# Patient Record
Sex: FEMALE | Race: WHITE | NOT HISPANIC OR LATINO | Employment: UNEMPLOYED | URBAN - METROPOLITAN AREA
[De-identification: names, ages, dates, MRNs, and addresses within clinical notes are randomized per-mention and may not be internally consistent; named-entity substitution may affect disease eponyms.]

---

## 2017-01-11 ENCOUNTER — CONVERSION ENCOUNTER (OUTPATIENT)
Dept: MAMMOGRAPHY | Facility: HOSPITAL | Age: 57
End: 2017-01-11

## 2017-02-03 ENCOUNTER — CONVERSION ENCOUNTER (OUTPATIENT)
Dept: PERIOP | Facility: HOSPITAL | Age: 57
End: 2017-02-03

## 2018-02-05 ENCOUNTER — CONVERSION ENCOUNTER (OUTPATIENT)
Dept: GENERAL RADIOLOGY | Facility: HOSPITAL | Age: 58
End: 2018-02-05

## 2018-08-06 ENCOUNTER — OFFICE VISIT CONVERTED (OUTPATIENT)
Dept: ORTHOPEDIC SURGERY | Facility: CLINIC | Age: 58
End: 2018-08-06
Attending: ORTHOPAEDIC SURGERY

## 2018-09-17 ENCOUNTER — OFFICE VISIT CONVERTED (OUTPATIENT)
Dept: ORTHOPEDIC SURGERY | Facility: CLINIC | Age: 58
End: 2018-09-17
Attending: ORTHOPAEDIC SURGERY

## 2018-10-29 ENCOUNTER — OFFICE VISIT CONVERTED (OUTPATIENT)
Dept: ORTHOPEDIC SURGERY | Facility: CLINIC | Age: 58
End: 2018-10-29
Attending: ORTHOPAEDIC SURGERY

## 2018-11-12 ENCOUNTER — OFFICE VISIT CONVERTED (OUTPATIENT)
Dept: ORTHOPEDIC SURGERY | Facility: CLINIC | Age: 58
End: 2018-11-12
Attending: ORTHOPAEDIC SURGERY

## 2018-11-12 ENCOUNTER — CONVERSION ENCOUNTER (OUTPATIENT)
Dept: ORTHOPEDIC SURGERY | Facility: CLINIC | Age: 58
End: 2018-11-12

## 2018-12-27 ENCOUNTER — OFFICE VISIT CONVERTED (OUTPATIENT)
Dept: ORTHOPEDIC SURGERY | Facility: CLINIC | Age: 58
End: 2018-12-27
Attending: ORTHOPAEDIC SURGERY

## 2018-12-27 ENCOUNTER — CONVERSION ENCOUNTER (OUTPATIENT)
Dept: ORTHOPEDIC SURGERY | Facility: CLINIC | Age: 58
End: 2018-12-27

## 2019-02-07 ENCOUNTER — HOSPITAL ENCOUNTER (OUTPATIENT)
Dept: GENERAL RADIOLOGY | Facility: HOSPITAL | Age: 59
Discharge: HOME | End: 2019-02-07
Attending: NURSE PRACTITIONER

## 2019-02-22 ENCOUNTER — HOSPITAL ENCOUNTER (OUTPATIENT)
Dept: ONCOLOGY | Facility: HOSPITAL | Age: 59
Discharge: HOME | End: 2019-02-22
Attending: NURSE PRACTITIONER

## 2019-05-22 ENCOUNTER — HOSPITAL ENCOUNTER (OUTPATIENT)
Dept: ONCOLOGY | Facility: HOSPITAL | Age: 59
Discharge: HOME | End: 2019-05-22
Attending: NURSE PRACTITIONER

## 2019-12-20 ENCOUNTER — OFFICE VISIT CONVERTED (OUTPATIENT)
Dept: ORTHOPEDIC SURGERY | Facility: CLINIC | Age: 59
End: 2019-12-20
Attending: ORTHOPAEDIC SURGERY

## 2019-12-20 ENCOUNTER — CONVERSION ENCOUNTER (OUTPATIENT)
Dept: ORTHOPEDIC SURGERY | Facility: CLINIC | Age: 59
End: 2019-12-20

## 2020-02-04 ENCOUNTER — CONVERSION ENCOUNTER (OUTPATIENT)
Dept: ORTHOPEDIC SURGERY | Facility: CLINIC | Age: 60
End: 2020-02-04

## 2020-02-04 ENCOUNTER — OFFICE VISIT CONVERTED (OUTPATIENT)
Dept: ORTHOPEDIC SURGERY | Facility: CLINIC | Age: 60
End: 2020-02-04
Attending: ORTHOPAEDIC SURGERY

## 2020-02-18 ENCOUNTER — OFFICE VISIT CONVERTED (OUTPATIENT)
Dept: ORTHOPEDIC SURGERY | Facility: CLINIC | Age: 60
End: 2020-02-18
Attending: ORTHOPAEDIC SURGERY

## 2020-03-17 ENCOUNTER — OFFICE VISIT CONVERTED (OUTPATIENT)
Dept: ORTHOPEDIC SURGERY | Facility: CLINIC | Age: 60
End: 2020-03-17
Attending: ORTHOPAEDIC SURGERY

## 2020-05-12 ENCOUNTER — HOSPITAL ENCOUNTER (OUTPATIENT)
Dept: GENERAL RADIOLOGY | Facility: HOSPITAL | Age: 60
Discharge: HOME | End: 2020-05-12

## 2020-08-19 ENCOUNTER — HOSPITAL ENCOUNTER (OUTPATIENT)
Dept: PREADMISSION TESTING | Facility: HOSPITAL | Age: 60
Discharge: HOME | End: 2020-08-19
Attending: ORTHOPAEDIC SURGERY

## 2020-08-20 LAB — SARS-COV-2 RNA SPEC QL NAA+PROBE: NOT DETECTED

## 2020-08-24 ENCOUNTER — HOSPITAL ENCOUNTER (OUTPATIENT)
Dept: PERIOP | Facility: HOSPITAL | Age: 60
Setting detail: HOSPITAL OUTPATIENT SURGERY
Discharge: HOME | End: 2020-08-24
Attending: ORTHOPAEDIC SURGERY

## 2020-08-24 LAB
GLUCOSE BLD-MCNC: 120 MG/DL (ref 65–99)
GLUCOSE BLD-MCNC: 122 MG/DL (ref 65–99)

## 2020-09-08 ENCOUNTER — OFFICE VISIT CONVERTED (OUTPATIENT)
Dept: ORTHOPEDIC SURGERY | Facility: CLINIC | Age: 60
End: 2020-09-08
Attending: ORTHOPAEDIC SURGERY

## 2020-09-09 ENCOUNTER — OFFICE VISIT CONVERTED (OUTPATIENT)
Dept: ONCOLOGY | Facility: HOSPITAL | Age: 60
End: 2020-09-09
Attending: NURSE PRACTITIONER

## 2020-09-09 ENCOUNTER — HOSPITAL ENCOUNTER (OUTPATIENT)
Dept: OTHER | Facility: HOSPITAL | Age: 60
Discharge: HOME | End: 2020-09-09
Attending: NURSE PRACTITIONER

## 2020-10-05 ENCOUNTER — OFFICE VISIT CONVERTED (OUTPATIENT)
Dept: ORTHOPEDIC SURGERY | Facility: CLINIC | Age: 60
End: 2020-10-05
Attending: PHYSICIAN ASSISTANT

## 2020-12-30 ENCOUNTER — HOSPITAL ENCOUNTER (OUTPATIENT)
Dept: GENERAL RADIOLOGY | Facility: HOSPITAL | Age: 60
Discharge: HOME | End: 2020-12-30
Attending: NURSE PRACTITIONER

## 2020-12-30 LAB
CREAT BLD-MCNC: 1 MG/DL (ref 0.6–1.4)
GFR SERPLBLD BASED ON 1.73 SQ M-ARVRAT: >60 ML/MIN/{1.73_M2}

## 2021-02-25 ENCOUNTER — HOSPITAL ENCOUNTER (OUTPATIENT)
Dept: GENERAL RADIOLOGY | Facility: HOSPITAL | Age: 61
Discharge: HOME | End: 2021-02-25
Attending: NURSE PRACTITIONER

## 2021-03-09 ENCOUNTER — HOSPITAL ENCOUNTER (OUTPATIENT)
Dept: ONCOLOGY | Facility: HOSPITAL | Age: 61
Discharge: HOME | End: 2021-03-09
Attending: NURSE PRACTITIONER

## 2021-03-09 ENCOUNTER — OFFICE VISIT CONVERTED (OUTPATIENT)
Dept: ONCOLOGY | Facility: HOSPITAL | Age: 61
End: 2021-03-09
Attending: NURSE PRACTITIONER

## 2021-03-09 LAB
ALBUMIN SERPL-MCNC: 3.7 G/DL (ref 3.5–5)
ALBUMIN/GLOB SERPL: 1.3 {RATIO} (ref 1.4–2.6)
ALP SERPL-CCNC: 71 U/L (ref 53–141)
ALT SERPL-CCNC: 22 U/L (ref 10–40)
ANION GAP SERPL CALC-SCNC: 21 MMOL/L (ref 8–19)
AST SERPL-CCNC: 25 U/L (ref 15–50)
BASOPHILS # BLD AUTO: 0.01 10*3/UL (ref 0–0.2)
BASOPHILS NFR BLD AUTO: 0.4 % (ref 0–3)
BILIRUB SERPL-MCNC: 0.27 MG/DL (ref 0.2–1.3)
BUN SERPL-MCNC: 15 MG/DL (ref 5–25)
BUN/CREAT SERPL: 13 {RATIO} (ref 6–20)
CALCIUM SERPL-MCNC: 9 MG/DL (ref 8.7–10.4)
CHLORIDE SERPL-SCNC: 94 MMOL/L (ref 99–111)
CONV ABS IMM GRAN: 0.01 10*3/UL (ref 0–0.2)
CONV CO2: 25 MMOL/L (ref 22–32)
CONV IMMATURE GRAN: 0.4 % (ref 0–1.8)
CONV TOTAL PROTEIN: 6.5 G/DL (ref 6.3–8.2)
CREAT UR-MCNC: 1.16 MG/DL (ref 0.5–0.9)
DEPRECATED RDW RBC AUTO: 50 FL (ref 36.4–46.3)
EOSINOPHIL # BLD AUTO: 0.08 10*3/UL (ref 0–0.7)
EOSINOPHIL # BLD AUTO: 3.3 % (ref 0–7)
ERYTHROCYTE [DISTWIDTH] IN BLOOD BY AUTOMATED COUNT: 15.9 % (ref 11.7–14.4)
GFR SERPLBLD BASED ON 1.73 SQ M-ARVRAT: 51 ML/MIN/{1.73_M2}
GLOBULIN UR ELPH-MCNC: 2.8 G/DL (ref 2–3.5)
GLUCOSE SERPL-MCNC: 546 MG/DL (ref 65–99)
HCT VFR BLD AUTO: 28.1 % (ref 37–47)
HGB BLD-MCNC: 8.5 G/DL (ref 12–16)
LDH SERPL-CCNC: 171 U/L (ref 120–240)
LYMPHOCYTES # BLD AUTO: 0.69 10*3/UL (ref 1–5)
LYMPHOCYTES NFR BLD AUTO: 28.5 % (ref 20–45)
MCH RBC QN AUTO: 25.9 PG (ref 27–31)
MCHC RBC AUTO-ENTMCNC: 30.2 G/DL (ref 33–37)
MCV RBC AUTO: 85.7 FL (ref 81–99)
MONOCYTES # BLD AUTO: 0.15 10*3/UL (ref 0.2–1.2)
MONOCYTES NFR BLD AUTO: 6.2 % (ref 3–10)
NEUTROPHILS # BLD AUTO: 1.48 10*3/UL (ref 2–8)
NEUTROPHILS NFR BLD AUTO: 61.2 % (ref 30–85)
NRBC CBCN: 0 % (ref 0–0.7)
OSMOLALITY SERPL CALC.SUM OF ELEC: 308 MOSM/KG (ref 273–304)
PLATELET # BLD AUTO: 78 10*3/UL (ref 130–400)
PMV BLD AUTO: 11.6 FL (ref 9.4–12.3)
POTASSIUM SERPL-SCNC: 3.5 MMOL/L (ref 3.5–5.3)
RBC # BLD AUTO: 3.28 10*6/UL (ref 4.2–5.4)
SODIUM SERPL-SCNC: 136 MMOL/L (ref 135–147)
WBC # BLD AUTO: 2.42 10*3/UL (ref 4.8–10.8)

## 2021-03-12 ENCOUNTER — HOSPITAL ENCOUNTER (OUTPATIENT)
Dept: CT IMAGING | Facility: HOSPITAL | Age: 61
Discharge: HOME | End: 2021-03-12
Attending: NURSE PRACTITIONER

## 2021-03-12 LAB
BASOPHILS # BLD AUTO: 0.01 10*3/UL (ref 0–0.2)
BASOPHILS # BLD: 1 % (ref 0–3)
BASOPHILS NFR BLD AUTO: 0.4 % (ref 0–3)
CONV ABS BANDS: 0 % (ref 1–5)
CONV ABS IMM GRAN: 0.01 10*3/UL (ref 0–0.2)
CONV ANISOCYTES: SLIGHT
CONV HYPOCHROMIA IN BLOOD BY LIGHT MICROSCOPY: SLIGHT
CONV IMMATURE GRAN: 0.4 % (ref 0–1.8)
CONV SEGMENTED NEUTROPHILS: 64 % (ref 45–70)
DACRYOCYTES BLD QL SMEAR: ABNORMAL
DEPRECATED RDW RBC AUTO: 50 FL (ref 36.4–46.3)
EOSINOPHIL # BLD AUTO: 0.06 10*3/UL (ref 0–0.7)
EOSINOPHIL # BLD AUTO: 2.4 % (ref 0–7)
EOSINOPHIL NFR BLD AUTO: 2 % (ref 0–7)
ERYTHROCYTE [DISTWIDTH] IN BLOOD BY AUTOMATED COUNT: 15.9 % (ref 11.7–14.4)
ERYTHROCYTE [SEDIMENTATION RATE] IN BLOOD: 39 MM/H (ref 0–30)
FERRITIN SERPL-MCNC: 19 NG/ML (ref 10–200)
FOLATE SERPL-MCNC: 19.7 NG/ML (ref 4.8–20)
HCT VFR BLD AUTO: 29.1 % (ref 37–47)
HGB BLD-MCNC: 8.9 G/DL (ref 12–16)
IRON SATN MFR SERPL: 6 % (ref 20–55)
IRON SERPL-MCNC: 29 UG/DL (ref 60–170)
LYMPHOCYTES # BLD AUTO: 0.64 10*3/UL (ref 1–5)
LYMPHOCYTES NFR BLD AUTO: 26 % (ref 20–45)
MCH RBC QN AUTO: 26.2 PG (ref 27–31)
MCHC RBC AUTO-ENTMCNC: 30.6 G/DL (ref 33–37)
MCV RBC AUTO: 85.6 FL (ref 81–99)
MONOCYTES # BLD AUTO: 0.17 10*3/UL (ref 0.2–1.2)
MONOCYTES NFR BLD AUTO: 6.9 % (ref 3–10)
MONOCYTES NFR BLD MANUAL: 7 % (ref 3–10)
NEUTROPHILS # BLD AUTO: 1.57 10*3/UL (ref 2–8)
NEUTROPHILS NFR BLD AUTO: 63.9 % (ref 30–85)
NRBC CBCN: 0 % (ref 0–0.7)
NUC CELL # PRT MANUAL: 0 /100{WBCS}
OVALOCYTES BLD QL SMEAR: SLIGHT
PLAT MORPH BLD: NORMAL
PLATELET # BLD AUTO: 88 10*3/UL (ref 130–400)
PMV BLD AUTO: 11.2 FL (ref 9.4–12.3)
POIKILOCYTOSIS BLD QL SMEAR: SLIGHT
RBC # BLD AUTO: 3.4 10*6/UL (ref 4.2–5.4)
RETICS # AUTO: 0.09 10*6/UL (ref 0.02–0.08)
RETICS/RBC NFR AUTO: 2.69 % (ref 0.5–1.7)
SMALL PLATELETS BLD QL SMEAR: ABNORMAL
T4 FREE SERPL-MCNC: 1.5 NG/DL (ref 0.9–1.8)
TIBC SERPL-MCNC: 456 UG/DL (ref 245–450)
TRANSFERRIN SERPL-MCNC: 319 MG/DL (ref 250–380)
TSH SERPL-ACNC: 2.3 M[IU]/L (ref 0.27–4.2)
VARIANT LYMPHS NFR BLD MANUAL: 26 % (ref 20–45)
VIT B12 SERPL-MCNC: 439 PG/ML (ref 211–911)
WBC # BLD AUTO: 2.46 10*3/UL (ref 4.8–10.8)

## 2021-03-13 LAB
CONV IMMUNOGLOBULIN G (IGG): 927 MG/DL (ref 586–1602)
CONV IMMUNOGLOBULIN M (IGM): 102 MG/DL (ref 26–217)
FREE LIGHT CHAINS: 37.9 MG/L (ref 3.3–19.4)
HAPTOGLOB SERPL-MCNC: 173 MG/DL (ref 33–346)
IGA SERPL-MCNC: 173 MG/DL (ref 87–352)
KAPPA LC/LAMBDA SER: 1.33 {RATIO} (ref 0.26–1.65)
LAMBDA LC FREE SERPL-MCNC: 28.4 MG/L (ref 5.7–26.3)

## 2021-03-15 LAB
ALBUMIN SERPL-MCNC: 3.4 G/DL (ref 2.9–4.4)
ALBUMIN/GLOB SERPL: 1.1 {RATIO} (ref 0.7–1.7)
ALPHA2 GLOB SERPL ELPH-MCNC: 0.8 G/DL (ref 0.4–1)
BETA GLOBULIN: 1.1 G/DL (ref 0.7–1.3)
CONV ALPHA-1-GLOBULIN: 0.2 G/DL (ref 0–0.4)
CONV IMMUNOGLOBULIN G (IGG): 951 MG/DL (ref 586–1602)
CONV IMMUNOGLOBULIN M (IGM): 103 MG/DL (ref 26–217)
CONV PE INTERPRETATION: NORMAL
CONV PE NOTE: NORMAL
CONV TOTAL PROTEIN: 6.4 G/DL (ref 6–8.5)
EPO SERPL-ACNC: 89.4 MIU/ML (ref 2.6–18.5)
GAMMA GLOB SERPL ELPH-MCNC: 0.9 G/DL (ref 0.4–1.8)
GLOBULIN UR ELPH-MCNC: 3 G/DL (ref 2.2–3.9)
IGA SERPL-MCNC: 173 MG/DL (ref 87–352)
M-SPIKE: NORMAL G/DL
PROT PATTERN SERPL IFE-IMP: NORMAL

## 2021-03-17 LAB
COPPER SERPL-MCNC: 152 UG/DL (ref 80–158)
ZINC SERPL-MCNC: 94 UG/DL (ref 44–115)

## 2021-04-05 ENCOUNTER — OFFICE VISIT CONVERTED (OUTPATIENT)
Dept: GASTROENTEROLOGY | Facility: CLINIC | Age: 61
End: 2021-04-05
Attending: NURSE PRACTITIONER

## 2021-04-07 ENCOUNTER — HOSPITAL ENCOUNTER (OUTPATIENT)
Dept: CARDIOLOGY | Facility: HOSPITAL | Age: 61
Discharge: HOME | End: 2021-04-07
Attending: NURSE PRACTITIONER

## 2021-04-07 ENCOUNTER — HOSPITAL ENCOUNTER (OUTPATIENT)
Dept: OTHER | Facility: HOSPITAL | Age: 61
Discharge: HOME | End: 2021-04-07
Attending: NURSE PRACTITIONER

## 2021-04-07 LAB
C DIFF TOX B STL QL CT TISS CULT: NEGATIVE
CONV 027 TOXIN: NEGATIVE

## 2021-04-08 LAB
CONV NEUTRAL FATS: NORMAL
FAT STL QL: NORMAL

## 2021-04-09 LAB — BACTERIA SPEC AEROBE CULT: NORMAL

## 2021-05-04 ENCOUNTER — HOSPITAL ENCOUNTER (OUTPATIENT)
Dept: ONCOLOGY | Facility: HOSPITAL | Age: 61
Setting detail: RECURRING SERIES
Discharge: HOME | End: 2021-05-04
Attending: NURSE PRACTITIONER

## 2021-05-04 LAB
BASOPHILS # BLD AUTO: 0.01 10*3/UL (ref 0–0.2)
BASOPHILS NFR BLD AUTO: 0.4 % (ref 0–3)
CONV ABS IMM GRAN: 0 10*3/UL (ref 0–0.54)
CONV EOSINOPHILS PERCENT BY MANUAL COUNT: 3.1 % (ref 0–7)
CONV IMMATURE GRAN: 0 % (ref 0–0.4)
EOSINOPHIL # BLD MANUAL: 0.08 10*3/UL (ref 0–0.7)
ERYTHROCYTE [DISTWIDTH] IN BLOOD BY AUTOMATED COUNT: 19 % (ref 11.5–14.5)
ERYTHROCYTE [DISTWIDTH] IN BLOOD BY AUTOMATED COUNT: 64.6 FL
FERRITIN SERPL-MCNC: 505 NG/ML (ref 10–200)
HBA1C MFR BLD: 10.5 G/DL (ref 12–16)
HCT VFR BLD AUTO: 33 % (ref 37–47)
IRON SATN MFR SERPL: 18 % (ref 20–55)
IRON SERPL-MCNC: 59 UG/DL (ref 60–170)
LYMPHOCYTES # BLD AUTO: 0.6 10*3/UL (ref 1–5)
LYMPHOCYTES NFR BLD AUTO: 23.2 % (ref 20–45)
MCH RBC QN AUTO: 29.5 PG (ref 27–31)
MCHC RBC AUTO-ENTMCNC: 31.8 G/DL (ref 33–37)
MCV RBC AUTO: 92.7 FL (ref 81–99)
MONOCYTES # BLD AUTO: 0.15 10*3/UL (ref 0.2–1.2)
MONOCYTES NFR BLD MANUAL: 5.8 % (ref 3–10)
NEUTROPHILS # BLD AUTO: 1.75 10*3/UL (ref 2–8)
NEUTROPHILS NFR BLD MANUAL: 67.5 % (ref 30–85)
PATHOLOGY REVIEW: NORMAL
PLATELET # BLD AUTO: 68 10*3/UL (ref 130–400)
PMV BLD AUTO: 10.8 FL (ref 7.4–10.4)
RBC MORPH BLD: 3.56 10*6/UL (ref 4.2–5.4)
TIBC SERPL-MCNC: 327 UG/DL (ref 245–450)
TRANSFERRIN SERPL-MCNC: 229 MG/DL (ref 250–380)
WBC # BLD AUTO: 2.59 10*3/UL (ref 4.8–10.8)

## 2021-05-08 ENCOUNTER — HOSPITAL ENCOUNTER (OUTPATIENT)
Dept: MAMMOGRAPHY | Facility: HOSPITAL | Age: 61
Discharge: HOME | End: 2021-05-08
Attending: NURSE PRACTITIONER

## 2021-05-10 NOTE — H&P
History and Physical      Patient Name: Airam Pool   Patient ID: [id number]   Sex: Female   YOB: 1960    Primary Care Provider: Ольга MCFARLANE   Referring Provider: Ольга MCFARLANE    Visit Date: April 5, 2021    Provider: ALEXIS REYNOSO   Location: [facility]   Location Address: [address] [place] [address]  [id number]   Location Phone: [phone]          Chief Complaint  · Gastroesophageal reflux      History Of Present Illness  The patient is a 60 year old /White female, who presents on referral from Ольга MCFARLANE, for a gastroenterology evaluation of GERD. She describes episodes of abdominal pain, aspiration, heartburn, sour eructations, chronic coughing, hoarseness, and nausea, which began 10 years ago. When the pain occurs it is located in the RLQ and LLQ. The pain is a dull and aching type pain. The pain is moderate in severity. The patient's heartburn is exacerbated by spicy food, eating late, and lying down at night, and when it occurs it lasts a variable amount of time. It is improved by PPI's, Liquid Anti-acids, and sitting up. The nausea has been present for 1 year. The nausea occurs intermittently. When the nausea occurs the severity is mild. The patient is overweight. The patient has been on therapy for Acid Reflux. They have taken omeprazole and Protonix. It has been unsuccessful in relieving their symptoms.   Radiographic Studies:  She has undergone no recent radiographic imaging.   Diagnostic studies /Procedures:  Relevant procedures performed to date: an EGD with biopsy. The EGD with biopsy by Star Fischer MD was performed approximately 6 years ago, and revealed gastritis.      60-year-old female presented to the office today as a new patient with a history of reflux, diabetes and cholecystectomy.  Patient reports that she is having reflux severe at night.  Patient reports when she is lying down she is having shortness  of breath, but burning sensation in her esophagus, sour erections, chronic cough and hoarseness.  Patient reports that she has had reflux for over 10 years now.  Patient reports that lying down makes her symptoms worse and she was previously on omeprazole 40 mg but has been changed to Protonix 40 mg with no relief.  Patient has been on reflux therapy for over 10 years.  Patient's last EGD was in 2015 and her last colonoscopy was in 2012.  Patient has no family history of colorectal or intestinal cancers.  Patient reports she is also experiencing diarrhea that has been present for about a year.  The diarrhea is occurring 5-6 times during the day and at least 2 times at night and is described as watery brown stool.  She denies any exposure to antibiotics, food that may have been bad or contaminated water.  Patient is on city water.  Patient denies melena, hematochezia, fever, weight loss, night sweats.    Colonoscopy: Reviewed the patient's most recent colonoscopy 3/3/2012 revealed a normal colon throughout with recommended follow-up in 10 years.    EGD: Review of the patient's most recent EGD performed by Dr. Fischer 7/1/2015 revealed mild nonerosive gastritis in the antrum and the body of the stomach.         Past Medical History  Disease Name Date Onset Notes   AC (acromioclavicular) arthritis, bilateral shoulders 08/08/2018 --    Allergic rhinitis --  --    Arthritis --  --    Bilateral shoulder pain, unspecified chronicity 08/08/2018 --    Bilateral shoulders rotator cuff tendinitis 08/08/2018 --    Cancer --  --    Cervical spondylosis without myelopathy 05/05/2015 No signal change or significant stenosis   Diabetes --  --    Diabetes, unspecified --  --    Gastroesophageal Reflux Disorder --  --    Heart disease --  --    Hyperlipemia --  --    Hypertension --  --    Neck pain --  --    Pain, Head --  --    Pain, Joint --  --    Reflux --  --    Seasonal allergies --  --    Tendonitis: Bilateral Shoulder  09/19/2018 --          Past Surgical History  Procedure Name Date Notes   Cervical Fusion --  --    Cholecystectomy --  --    Colonoscopy 2013 --    EGD 2015 --    Gallbladder --  --    Surgical Clips --  --    Tubal ligation --  --          Medication List  Name Date Started Instructions   anastrozole 1 mg oral tablet  take 1 tablet (1 mg) by oral route once daily   carvedilol 6.25 mg oral tablet  take 1 tablet (6.25 mg) by oral route 2 times per day with food   gabapentin 300 mg oral capsule  take 1 capsule (300 mg) by oral route 3 times per day   glipizide 5 mg oral tablet  take 1 tablet (5 mg) by oral route 2 times per day before meals   metformin 500 mg oral tablet  take 1 tablet (500 mg) by oral route 2 times per day with morning and evening meals   omeprazole 20 mg oral capsule,delayed release(DR/EC)  take 1 capsule by oral route 2 times a day for 10 days   pravastatin 20 mg oral tablet  take 1 tablet (20 mg) by oral route once daily at bedtime   Tresiba FlexTouch U-100 100 unit/mL (3 mL) subcutaneous insulin pen  inject by subcutaneous route as per insulin protocol         Allergy List  Allergen Name Date Reaction Notes   NO KNOWN DRUG ALLERGIES --  --  --          Family Medical History  Disease Name Relative/Age Notes   Stroke Son/   Son   Cancer, Unspecified Father/   Father   - No Family History of Colorectal Cancer  --          Social History  Finding Status Start/Stop Quantity Notes   Alcohol Use Never --/-- --  does not drink   Claustophobic Unknown --/-- --  yes   lives with spouse --  --/-- --  --    . --  --/-- --  --    Recreational Drug Use Never --/-- --  no   Tobacco Former --/-- --  former smoker   Working --  --/-- --  --          Review of Systems  · Constitutional  o Denies  o : chills, fatigue, fever, loss of appetite, night sweats, weakness, weight gain, weight loss  · Eyes  o Denies  o : blurred vision, changes in vision  · Cardiovascular  o Denies  o : chest  "pain  · Respiratory  o Denies  o : shortness of breath  · Gastrointestinal  o Admits  o : abdominal pain, bowel changes, diarrhea, heartburn, nausea, reflux  o Denies  o : appetite poor, bloating, blood in stools, constipation, early satiety, hematemesis (vomiting blood), hemorrhoids, jaundice, rectal bleeding, vomiting  · Genitourinary  o Denies  o : dysuria, blood in urine  · Integument  o Denies  o : rash  · Neurologic  o Denies  o : tingling or numbness  · Musculoskeletal  o Denies  o : joint pain  · Endocrine  o Denies  o : weight gain, weight loss  · Psychiatric  o Denies  o : anxiety, depression      Vitals  Date Time BP Position Site L\R Cuff Size HR RR TEMP (F) WT  HT  BMI kg/m2 BSA m2 O2 Sat FR L/min FiO2 HC       04/05/2021 09:16 /62 Sitting    73 - R 16  200lbs 0oz 5'  3\" 35.43 2.01 100 %            Physical Examination  · Constitutional  o Appearance  o : Well-nourished, well developed, alert, in no acute distress, alert and oriented X 3.  · Eyes  o Vision  o :   § Visual Fields  § : eyes move symmetrical in all directions  o Sclerae  o : sclerae anicteric  o Pupils and Irises  o : pupils equal and symetrical  · Neck  o Inspection/Palpation  o : Trachea is midline, no adenopathy  o Thyroid  o : Thyroid is not enlarged  · Respiratory  o Respiratory Effort  o : Breathing is unlabored.  o Inspection of Chest  o : normal appearance, no retractions  o Auscultation of Lungs  o : Chest is clear to auscultation bilaterally.  · Cardiovascular  o Heart  o :   § Auscultation of Heart  § : no murmurs, rubs, or gallops, regular rate and rhythm  · Gastrointestinal  o Abdominal Examination  o : Abdomen is soft, nontender to palpation, with normal active bowel sounds, no appreciable hepatosplenomegaly.  · Genitourinary  o Digital Rectal Examination  o : Deferred  · Skin and Subcutaneous Tissue  o General Inspection  o : Skin is without focal lesions. Skin turgor is normal.  · Psychiatric  o Mood and Affect  o : " Mood and affect are appropriate to circumstances.          Assessment  · Abdominal Pain     789.00/R10.9  · Altered Bowel Habits     787.99/R19.4  · Cough     786.2/R05  · Esophageal Reflux     530.81/K21.9  · Obesity, unspecified     278.00/E66.9  · Diarrhea     787.91/R19.7  · Nocturnal diarrhea     787.91/R19.7      Plan  · Orders  o Consent for Esophagogastrodudodenoscopy (EGD) with dilatation -Possible risk/complications, benefits, and alternatives to surgical or invasive procedure have been explained to patient and/or legal gaurdian. -Patient has been evaluated and can tolerate anesthesia and/or sedation. Risk, benefits, and alternatives to anesthesia and sedation have been explained to patient and/or legal gaurdian. (41400) - - 04/05/2021  o Consent for Colonoscopy Screening -Possible risk/complications, benefits, and alternatives to surgical or invasive procedure have been explained to patient and/or legal gaurdian. -Patient has been evaluated and can tolerate anesthesia and/or sedation. Risks, benefits, and alternatives to anesthesia and sedation have been explained to patient and/or legal gaurdian. () - - 04/05/2021  o Stool culture (32909, 92242) - - 04/05/2021  o Lactoferrin (Fecal) Qualitative (62835) - - 04/05/2021  o C difficile Toxigenic Assay (PCR) [facility] (61908) - - 04/05/2021  o Pancreatic elastase stool (11860) - - 04/05/2021  o Stool Giardia and Cryptosporidium Enzyme Immunoassay [facility] (14343, 32352) - - 04/05/2021  o Fecal Fat, Qualitative (84745) - - 04/05/2021  o Fecal Occult Blood Diagnostic (Send to Lab) [facility] (78410) - - 04/05/2021  · Medications  o Carafate 1 gram oral tablet   SIG: take 1 tablet (1 gram) by oral route 4 times per day on an empty stomach 1 hour before meals and at bedtime for 4 weeks   DISP: (112) Tablet with 3 refills  Prescribed on 04/05/2021     o omeprazole 20 mg oral capsule,delayed release(DR/EC)   SIG: take 1 capsule by oral route 2 times a day for 10 days   DISP:  (20) capsules with 0 refills  Discontinued on 04/05/2021     o Medications have been Reconciled  o Transition of Care or Provider Policy  · Instructions  o DISCUSSION/PLAN:  o 60-year-old female presenting to the office today as a new patient with a history of reflux, abdominal pain, chronic cough, diarrhea, diabetes and cholecystectomy. Patient has had worsening symptoms of her reflux along with new onset of diarrhea over the past 1 year. Patient has been on chronic reflux therapy for over 10 years. She is currently on Protonix 40 mg with no relief. I have recommended patient undergo EGD and colonoscopy for further evaluation. We will obtain stool studies prior to colonoscopy to ensure her diarrhea is not infectious in nature. I have reviewed the risk benefits of the procedures and explained how to collect stool studies properly. Patient will continue Protonix 40 mg we will add Carafate 1 g 4 times daily to see if this helps to relieve her reflux, chronic cough and nausea. If colonoscopy unremarkable I would consider adding colestipol to her current regimen. We will follow-up in the office after EGD and colonoscopy. Patient to call with any questions or concerns. Patient is agreeable to this plan.  · Disposition  o Call or Return if symptoms worsen or persist.  o Meds sent to pharmacy            Electronically Signed by: ALEXIS REYNOSO -Author on April 5, 2021 09:43:25 AM  Electronically Co-signed by: Star Fischer MD -Reviewer on April 18, 2021 01:31:29 AM

## 2021-05-11 ENCOUNTER — OFFICE VISIT CONVERTED (OUTPATIENT)
Dept: ONCOLOGY | Facility: HOSPITAL | Age: 61
End: 2021-05-11
Attending: NURSE PRACTITIONER

## 2021-05-11 ENCOUNTER — HOSPITAL ENCOUNTER (OUTPATIENT)
Dept: ONCOLOGY | Facility: HOSPITAL | Age: 61
Discharge: HOME | End: 2021-05-11
Attending: NURSE PRACTITIONER

## 2021-05-13 NOTE — PROGRESS NOTES
Progress Note      Patient Name: Airam Pool   Patient ID: [id number]   Sex: Female   YOB: 1960    Primary Care Provider: Ольга MCFARLANE   Referring Provider: Ольга MCFARLANE    Visit Date: September 8, 2020    Provider: Maxi Carpio MD   Location: [facility]   Location Address: [address] [place] [address]  [id number]   Location Phone: [phone]          Chief Complaint  · Left wrist pain      History Of Present Illness  Airam Pool is a 59 year old /White female who presents today to [facility].      The patient presents here today for 2 week post op follow up for her left wrist. Patient had a Left wrist de Quervain''s tendon release on 8/26/20. Patient is doing well today. The splint and sutures were removed today. Patient is overall doing well today, and has no other complaints.       Past Medical History  AC (acromioclavicular) arthritis, bilateral shoulders; Allergic rhinitis; Arthritis; Bilateral shoulder pain, unspecified chronicity; Bilateral shoulders rotator cuff tendinitis; Cancer; Cervical spondylosis without myelopathy; Diabetes; Diabetes, unspecified; Gastroesophageal Reflux Disorder; Heart disease; Hyperlipemia; Hypertension; Neck pain; Pain, Head; Pain, Joint; Reflux; Seasonal allergies; Tendonitis: Bilateral Shoulder         Past Surgical History  Cervical Fusion; Cholecystectomy; Colonoscopy; EGD; Gallbladder; Surgical Clips; Tubal ligation         Medication List  Claritin 10 mg oral tablet; diclofenac sodium 75 mg oral tablet,delayed release (DR/EC); gabapentin 300 mg oral capsule; glipizide 5 mg oral tablet; Jardiance 10 mg oral tablet; metformin 500 mg oral tablet; omeprazole 20 mg oral capsule,delayed release(DR/EC); pravastatin 20 mg oral tablet; ranitidine HCl 150 mg oral capsule; tizanidine 4 mg oral tablet; triamterene-hydrochlorothiazid 37.5-25 mg oral tablet; Voltaren 1 % topical gel         Allergy List  NO KNOWN  "DRUG ALLERGIES       Allergies Reconciled  Family Medical History  Stroke; Cancer, Unspecified; - No Family History of Colorectal Cancer         Social History  Alcohol (Never); Alcohol Use (Never); Claustophobic (Unknown); lives with spouse; .; Recreational Drug Use (Never); Tobacco (Former); Working         Review of Systems  · Constitutional  o Denies  o : fever, chills, weight loss  · Cardiovascular  o Denies  o : chest pain, shortness of breath  · Gastrointestinal  o Denies  o : liver disease, heartburn, nausea, blood in stools  · Genitourinary  o Denies  o : painful urination, blood in urine  · Integument  o Denies  o : rash, itching  · Neurologic  o Denies  o : headache, weakness, loss of consciousness  · Musculoskeletal  o Admits  o : painful, swollen joints  · Psychiatric  o Admits  o : depression  o Denies  o : drug/alcohol addiction, anxiety      Vitals  Date Time BP Position Site L\R Cuff Size HR RR TEMP (F) WT  HT  BMI kg/m2 BSA m2 O2 Sat        09/08/2020 10:41 AM         182lbs 0oz 5'  5\" 30.29 1.95           Physical Examination  · Constitutional  o Appearance  o : well developed, well-nourished, no obvious deformities present  · Head and Face  o Head  o :   § Inspection  § : normocephalic  o Face  o :   § Inspection  § : no facial lesions  · Eyes  o Conjunctivae  o : conjunctivae normal  o Sclerae  o : sclerae white  · Ears, Nose, Mouth and Throat  o Ears  o :   § External Ears  § : appearance within normal limits  § Hearing  § : intact  o Nose  o :   § External Nose  § : appearance normal  · Neck  o Inspection/Palpation  o : normal appearance  o Range of Motion  o : full range of motion  · Respiratory  o Respiratory Effort  o : breathing unlabored  o Inspection of Chest  o : normal appearance  o Auscultation of Lungs  o : no audible wheezing or rales  · Cardiovascular  o Heart  o : regular rate  · Gastrointestinal  o Abdominal Examination  o : soft and non-tender  · Skin and Subcutaneous " Tissue  o General Inspection  o : intact, no rashes  · Psychiatric  o General  o : Alert and oriented x3  o Judgement and Insight  o : judgment and insight intact  o Mood and Affect  o : mood normal, affect appropriate  · Left Wrist  o Inspection  o : Tender over incision. Skin is intact. Neurovascularly intact. Well healing incision. No signs of infection          Assessment  · Left wrist de Quervain''s tendon release;Aftercare following surgery of the muskuloskeletal system     V54.81  · Left wrist pain     719.43/M25.532      Plan  · Medications  o Medications have been Reconciled  o Transition of Care or Provider Policy  · Instructions  o Sutures removed in clinic today.  o Reviewed the patient's Past Medical, Social, and Family history as well as the ROS at today's visit, no changes.  o Call or return if worsening symptoms.  o The above service was scribed by Jocelin Khan on my behalf and I attest to the accuracy of the note. jsb  o Plan for follow up in 1 month. Patient may use Vitamin E oil on incision to help with scaring.   o Electronically Identified Patient Education Materials Provided Electronically            Electronically Signed by: Jocelin Khan MA -Author on September 9, 2020 01:00:01 PM  Electronically Co-signed by: Maxi Carpio MD -Reviewer on September 9, 2020 10:13:44 PM

## 2021-05-13 NOTE — PROGRESS NOTES
Progress Note      Patient Name: Airam Pool   Patient ID: [id number]   Sex: Female   YOB: 1960    Primary Care Provider: Ольга MCFARLANE   Referring Provider: Ольга MCFARLANE    Visit Date: October 5, 2020    Provider: Star Baltazar PA-C   Location: [facility]   Location Address: [address] [place] [address]  [id number]   Location Phone: [phone]          Chief Complaint  · follow up on left wrist pain      History Of Present Illness  Airam Pool is a 59 year old /White female who presents today to [facility]. Patient presents for follow-up evaluation of left wrist de Quervain's tendon release, 8/24/2020. Patient states she is doing well she has good use of the hand, states there is an achy pain occasionally but otherwise has full range of motion and strength and denies need for medication for pain. Patient states she is happy with her surgery results. No new complaints today.       Past Medical History  AC (acromioclavicular) arthritis, bilateral shoulders; Allergic rhinitis; Arthritis; Bilateral shoulder pain, unspecified chronicity; Bilateral shoulders rotator cuff tendinitis; Cancer; Cervical spondylosis without myelopathy; Diabetes; Diabetes, unspecified; Gastroesophageal Reflux Disorder; Heart disease; Hyperlipemia; Hypertension; Neck pain; Pain, Head; Pain, Joint; Reflux; Seasonal allergies; Tendonitis: Bilateral Shoulder         Past Surgical History  Cervical Fusion; Cholecystectomy; Colonoscopy; EGD; Gallbladder; Surgical Clips; Tubal ligation         Medication List  Claritin 10 mg oral tablet; diclofenac sodium 75 mg oral tablet,delayed release (DR/EC); gabapentin 300 mg oral capsule; glipizide 5 mg oral tablet; Jardiance 10 mg oral tablet; metformin 500 mg oral tablet; omeprazole 20 mg oral capsule,delayed release(DR/EC); pravastatin 20 mg oral tablet; ranitidine HCl 150 mg oral capsule; tizanidine 4 mg oral tablet;  "triamterene-hydrochlorothiazid 37.5-25 mg oral tablet; Voltaren 1 % topical gel         Allergy List  NO KNOWN DRUG ALLERGIES       Allergies Reconciled  Family Medical History  Stroke; Cancer, Unspecified; - No Family History of Colorectal Cancer         Social History  Alcohol (Never); Alcohol Use (Never); Claustophobic (Unknown); lives with spouse; .; Recreational Drug Use (Never); Tobacco (Former); Working         Review of Systems  · Constitutional  o Denies  o : fever, chills, weight loss  · Cardiovascular  o Denies  o : chest pain, shortness of breath  · Gastrointestinal  o Denies  o : liver disease, heartburn, nausea, blood in stools  · Genitourinary  o Denies  o : painful urination, blood in urine  · Integument  o Denies  o : rash, itching  · Neurologic  o Denies  o : headache, weakness, loss of consciousness  · Musculoskeletal  o Denies  o : painful, swollen joints  · Psychiatric  o Denies  o : drug/alcohol addiction, anxiety, depression      Vitals  Date Time BP Position Site L\R Cuff Size HR RR TEMP (F) WT  HT  BMI kg/m2 BSA m2 O2 Sat FR L/min FiO2 HC       10/05/2020 10:46 AM      93 - R   193lbs 16oz 5'  5\" 32.28 2.01 98 %            Physical Examination  · Constitutional  o Appearance  o : well developed, well-nourished, no obvious deformities present  · Head and Face  o Head  o :   § Inspection  § : normocephalic  o Face  o :   § Inspection  § : no facial lesions  · Eyes  o Conjunctivae  o : conjunctivae normal  o Sclerae  o : sclerae white  · Ears, Nose, Mouth and Throat  o Ears  o :   § External Ears  § : appearance within normal limits  § Hearing  § : intact  o Nose  o :   § External Nose  § : appearance normal  · Neck  o Inspection/Palpation  o : normal appearance  o Range of Motion  o : full range of motion  · Respiratory  o Respiratory Effort  o : breathing unlabored  o Inspection of Chest  o : normal appearance  o Auscultation of Lungs  o : no audible wheezing or " rales  · Cardiovascular  o Heart  o : regular rate  · Gastrointestinal  o Abdominal Examination  o : soft and non-tender  · Skin and Subcutaneous Tissue  o General Inspection  o : intact, no rashes  · Psychiatric  o General  o : Alert and oriented x3  o Judgement and Insight  o : judgment and insight intact  o Mood and Affect  o : mood normal, affect appropriate  · Left Wrist  o Inspection  o : Well-healed incision, no erythema ecchymosis or drainage or signs of infection. Nontender to palpation, full range of motion of fingers and thumb with abduction, adduction, flexion and extension. 5 out of 5  strength, wrist range of motion intact. Neurovascularly intact.          Assessment  · Aftercare following surgery of left wrist de Quervain's tendon release, 8/24/2     V54.81  · Pain: Wrist     719.43/M25.539      Plan  · Medications  o Medications have been Reconciled  o Transition of Care or Provider Policy  · Instructions  o Reviewed the patient's Past Medical, Social, and Family history as well as the ROS at today's visit, no changes.  o Call or return if worsening symptoms.  o Follow up as needed.  o Patient was advised to continue activity as tolerated, follow-up as needed, patient agrees            Electronically Signed by: LYUBOV Alvarez-JHOANA -Author on October 5, 2020 11:46:06 AM  Electronically Co-signed by: Maxi Carpio MD -Reviewer on October 6, 2020 10:09:00 PM

## 2021-05-14 VITALS
DIASTOLIC BLOOD PRESSURE: 62 MMHG | OXYGEN SATURATION: 100 % | BODY MASS INDEX: 35.44 KG/M2 | SYSTOLIC BLOOD PRESSURE: 170 MMHG | HEART RATE: 73 BPM | WEIGHT: 200 LBS | RESPIRATION RATE: 16 BRPM | HEIGHT: 63 IN

## 2021-05-14 VITALS — HEART RATE: 93 BPM | BODY MASS INDEX: 32.32 KG/M2 | HEIGHT: 65 IN | OXYGEN SATURATION: 98 % | WEIGHT: 194 LBS

## 2021-05-14 VITALS — WEIGHT: 182 LBS | HEIGHT: 65 IN | BODY MASS INDEX: 30.32 KG/M2

## 2021-05-15 VITALS
OXYGEN SATURATION: 94 % | HEART RATE: 85 BPM | HEIGHT: 65 IN | WEIGHT: 190 LBS | BODY MASS INDEX: 31.65 KG/M2 | RESPIRATION RATE: 16 BRPM

## 2021-05-15 VITALS
HEART RATE: 74 BPM | OXYGEN SATURATION: 94 % | HEIGHT: 65 IN | RESPIRATION RATE: 16 BRPM | BODY MASS INDEX: 31.65 KG/M2 | WEIGHT: 190 LBS

## 2021-05-15 VITALS — HEIGHT: 65 IN | OXYGEN SATURATION: 97 % | WEIGHT: 188.5 LBS | BODY MASS INDEX: 31.4 KG/M2 | HEART RATE: 84 BPM

## 2021-05-15 VITALS — HEIGHT: 66 IN | OXYGEN SATURATION: 97 % | BODY MASS INDEX: 30.56 KG/M2 | HEART RATE: 72 BPM | WEIGHT: 190.12 LBS

## 2021-05-16 VITALS — WEIGHT: 177 LBS | BODY MASS INDEX: 29.49 KG/M2 | HEART RATE: 74 BPM | OXYGEN SATURATION: 97 % | HEIGHT: 65 IN

## 2021-05-16 VITALS — HEART RATE: 90 BPM | OXYGEN SATURATION: 97 % | BODY MASS INDEX: 29.99 KG/M2 | HEIGHT: 65 IN | WEIGHT: 180 LBS

## 2021-05-16 VITALS — HEART RATE: 81 BPM | OXYGEN SATURATION: 98 % | HEIGHT: 65 IN

## 2021-05-16 VITALS — OXYGEN SATURATION: 96 % | WEIGHT: 164 LBS | HEART RATE: 73 BPM | HEIGHT: 65 IN | BODY MASS INDEX: 27.32 KG/M2

## 2021-05-16 VITALS — OXYGEN SATURATION: 97 % | HEART RATE: 92 BPM | BODY MASS INDEX: 30.68 KG/M2 | WEIGHT: 184.12 LBS | HEIGHT: 65 IN

## 2021-05-21 ENCOUNTER — HOSPITAL ENCOUNTER (OUTPATIENT)
Dept: PREADMISSION TESTING | Facility: HOSPITAL | Age: 61
Discharge: HOME | End: 2021-05-21
Attending: INTERNAL MEDICINE

## 2021-05-21 LAB — SARS-COV-2 RNA SPEC QL NAA+PROBE: NOT DETECTED

## 2021-05-26 ENCOUNTER — HOSPITAL ENCOUNTER (OUTPATIENT)
Dept: ONCOLOGY | Facility: HOSPITAL | Age: 61
Setting detail: RECURRING SERIES
End: 2021-06-14
Attending: NURSE PRACTITIONER

## 2021-05-26 LAB
BASOPHILS # BLD AUTO: 0.02 10*3/UL (ref 0–0.2)
BASOPHILS NFR BLD AUTO: 0.6 % (ref 0–3)
CONV ABS IMM GRAN: 0 10*3/UL (ref 0–0.54)
CONV EOSINOPHILS PERCENT BY MANUAL COUNT: 3.7 % (ref 0–7)
CONV IMMATURE GRAN: 0 % (ref 0–0.4)
EOSINOPHIL # BLD MANUAL: 0.13 10*3/UL (ref 0–0.7)
ERYTHROCYTE [DISTWIDTH] IN BLOOD BY AUTOMATED COUNT: 16.8 % (ref 11.5–14.5)
ERYTHROCYTE [DISTWIDTH] IN BLOOD BY AUTOMATED COUNT: 57.6 FL
HBA1C MFR BLD: 12 G/DL (ref 12–16)
HCT VFR BLD AUTO: 37 % (ref 37–47)
LYMPHOCYTES # BLD AUTO: 0.74 10*3/UL (ref 1–5)
LYMPHOCYTES NFR BLD AUTO: 20.9 % (ref 20–45)
MCH RBC QN AUTO: 29.9 PG (ref 27–31)
MCHC RBC AUTO-ENTMCNC: 32.4 G/DL (ref 33–37)
MCV RBC AUTO: 92 FL (ref 81–99)
MONOCYTES # BLD AUTO: 0.18 10*3/UL (ref 0.2–1.2)
MONOCYTES NFR BLD MANUAL: 5.1 % (ref 3–10)
NEUTROPHILS # BLD AUTO: 2.47 10*3/UL (ref 2–8)
NEUTROPHILS NFR BLD MANUAL: 69.7 % (ref 30–85)
PLATELET # BLD AUTO: 76 10*3/UL (ref 130–400)
PMV BLD AUTO: 10 FL (ref 7.4–10.4)
RBC MORPH BLD: 4.02 10*6/UL (ref 4.2–5.4)
WBC # BLD AUTO: 3.54 10*3/UL (ref 4.8–10.8)

## 2021-05-27 ENCOUNTER — TRANSCRIBE ORDERS (OUTPATIENT)
Dept: ADMINISTRATIVE | Facility: HOSPITAL | Age: 61
End: 2021-05-27

## 2021-05-27 ENCOUNTER — HOSPITAL ENCOUNTER (OUTPATIENT)
Dept: GASTROENTEROLOGY | Facility: HOSPITAL | Age: 61
Setting detail: HOSPITAL OUTPATIENT SURGERY
Discharge: HOME | End: 2021-05-27
Attending: INTERNAL MEDICINE

## 2021-05-27 DIAGNOSIS — K63.89 ILEOCECAL VALVE STENOSIS: Primary | ICD-10-CM

## 2021-05-27 LAB — GLUCOSE BLD-MCNC: 172 MG/DL (ref 65–99)

## 2021-05-28 VITALS
HEART RATE: 68 BPM | DIASTOLIC BLOOD PRESSURE: 57 MMHG | HEART RATE: 73 BPM | SYSTOLIC BLOOD PRESSURE: 181 MMHG | SYSTOLIC BLOOD PRESSURE: 200 MMHG | RESPIRATION RATE: 18 BRPM | WEIGHT: 200.62 LBS | TEMPERATURE: 97.2 F | HEART RATE: 74 BPM | BODY MASS INDEX: 33.16 KG/M2 | OXYGEN SATURATION: 97 % | HEIGHT: 63 IN | DIASTOLIC BLOOD PRESSURE: 59 MMHG | RESPIRATION RATE: 18 BRPM | OXYGEN SATURATION: 100 % | BODY MASS INDEX: 33.38 KG/M2 | WEIGHT: 187.17 LBS | DIASTOLIC BLOOD PRESSURE: 63 MMHG | OXYGEN SATURATION: 98 % | WEIGHT: 198.41 LBS | TEMPERATURE: 97.3 F | SYSTOLIC BLOOD PRESSURE: 155 MMHG | TEMPERATURE: 98.1 F | BODY MASS INDEX: 35.15 KG/M2 | RESPIRATION RATE: 18 BRPM

## 2021-05-28 NOTE — PROGRESS NOTES
Patient: LEILA POOL     Acct: [id number]     Report: #[id number]  UNIT #: [id number]     : 1960    Encounter Date:2021  PRIMARY CARE: SHAY ROGERS  ***Signed***  --------------------------------------------------------------------------------------------------------------------  NURSE INTAKE      Visit Type      Established Patient Visit            Chief Complaint      breast ca            Referring Provider/Copies To      Referring Provider:  SHAY ROGERS      Primary Care Provider:  SHAY ROGERS      Copies To:   SHAY ROGERS            History and Present Illness      Past Oncology Illness History      1) LEFT breast calcification: CNbx: DCIS. (17): size: 5 mm, solid,     cribiform, micropapillary, low to intermediate grade, no necrosis,     calcifications present: ER (+) 95%, [place] (+) 90%.             Treatment:             1) Left breast, excision with  needle lumpectomy: dx: (2/3/17) negative for     DCIS. Stage: pTis (DCIS).       2) Radiation consult for radiation: Radiation not recommended by Dr. Kaufman.     (3/6/2017)      3) Dexa scan: osteopenia in the femoral head. (3/3017)      4) Arimidex  1mg QD started. (17)      5) Mammogram was benign. (18) (19)            HPI - Oncology Interim      1) LEFT breast calcification: CNbx: DCIS. (17): size: 5 mm, solid,     cribiform, micropapillary, low to intermediate grade, no necrosis,     calcifications present: ER (+) 95%, AZ (+) 90%.             Ms. Leila Pool presents for follow up to review mammogram. Mammogram is benign.     She continues to take Anastrozole and is tolerating well. Offers no complaints     with the meds at this time.             2) Pancytopenia: At her last visit, she was noted to have pancytopenia. WBC      2.42, Hemoglobin 8.5, MCV 85, Platelet count 85,000 on 3/9/21. Recheck of lab     work was done on 3/12/21 with wbc of 2.46, hemoglobin of 8.9, platlet count     88,000. Iron studies were  done with iron level of 29, iron sat of 6%, Ferritin     of 19. She was scheduled for Injectafer infusions and CT of abdomen and pelvis     were ordered. Mrs. Sánchez denies any GI bleeding. She does report frequent loose     stools.Denies any difficulty swallowing.             3) Hepatosplenomegaly: CT of abdomen and pelvis  done on 3/12/21 showed fatty     liver with hepatospenomegaly possibly related to underlying liver disease.             4) Loose stools: Stool studies were negative. Taking Imodium for loose stools.     Denies any blood in stool.             5) Carotid Stenosis: Recent carotid dopplers completed: 80-99 % occlusion in the    left internal carotid and 60-70% in the right internal carotid. BP elevated     today of 207/59. Denies any vision changes. Denies any CP, left sided weakness     noted. She is on Lisinopril / HCTZ QD and Carvedilol 6.25 BID.            Clinical Trial Participant      No            ECOG Performance Status      0            Most Recent Lab Findings      Laboratory Tests      21 13:09            Laboratory Tests            Test       21      13:05             Ferritin       505 ng/mL      ()            Most Recent Imaging Findings      [facility]                PACS RADIOLOGY REPORT            Patient: LEILA SÁNCHEZ   Acct: #[id number]   Report: #[id number]            UNIT #: [id number]    DOS: 21 1430      INSURANCE:[employer]   ORDER #:[id number]      LOCATION:[facility]     : 1960            PROVIDERS      ADMITTING:     ATTENDING: VANESSA BEST      FAMILY:  NONE,MD   ORDERING:  VANESSA BEST         OTHER:    DICTATING:  Luke Lopez MD            REQ #:[id number]   EXAM:OSTEO - BONE DENSITY SCAN 1 SITE      REASON FOR EXAM:        REASON FOR VISIT:  OTHER DISORDER OF BONE            *******Signed******         PROCEDURE:   BONE DENSITY SCAN GREATER THAN OR  EQUAL TO ONE SITE             COMPARISON:   None.             TECHNIQUE:   Lumbar vertebral and proximal femoral dual-energy X-ray     absorptiometry (DXA) was       performed on a Readbug device.        INDICATIONS:   OTHER DISORDER OF BONE/POST MENOPAUSE             FINDINGS:                Spine: The bone mineral density extending from L2 through L4 is 1.021 g per CM     squared which       corresponds to a T score of -1.6 and a Z score of -1.1. This is consistent with     osteopenia.             Compared to the previous examination of 3/6/2017, there has been an interval     10.4% decrease in bone       density in the lumbar spine.              Hip: The mean bone density in the femoral neck is 0.859 g per CM squared which     corresponds to a T       score of -1.3 and a Z score of -0.5. This is consistent with osteopenia.             Compared to the previous examination of 3/6/2017, there has been 1% decrease in     bone mineral       density in the femoral necks. A change of 5% or greater is considered     statistically significant.              CONCLUSIONS:      1.  Spine:  Osteopenia      2.  Hips:  Osteopenia      3.    The estimated 10 year probabilities for a major osteoporotic fracture and     for a hip fracture       are 7.1% and 0.5%, respectively.              Luke Lopez M.D.             Electronically Signed and Approved By: Luke Lopez M.D. on 2021 at 15:05                  _______________________________________________________________________________                     [facility]                PACS RADIOLOGY REPORT            Patient: LEILA SÁNCHEZ   Acct: #[id number]   Report: #[id number]            UNIT #: [id number]    DOS: 21 1042      INSURANCE:[employer]   ORDER #:[id number]      LOCATION:CT     : 1960            PROVIDERS      ADMITTING:     ATTENDING: VANESSA BEST      FAMILY:   SHAY ROGERS   ORDERING:  VANESSA BEST onc         OTHER:    DICTATING:  Jd Overton MD            RE #:[id number]   EXAM:ABDPELW - CT ABDOMEN  PELVIS w CONTRAST      REASON FOR EXAM:  FATTY LIVER,HEPATOMEGALY,SPLEENOMEGALY      REASON FOR VISIT:  FATTY LIVER,HEPATOMEGALY,SPLEENOMEGALY            *******Signed******         PROCEDURE:   CT ABDOMEN PELVIS WITH CONTRAST             COMPARISON:   [facility], CT, CT CHEST ABD PEL W CONTRAST,    8/03/2015, 15:59.             INDICATIONS:   GENERALIZED ABDOMEN PAIN WITH DIARRHEA WITH FATTY     LIVER,HEPATOMEGALY AND SPLEENOMEGALY             TECHNIQUE:   After obtaining the patient's consent, CT images were created with     non-ionic intravenous       contrast material.               PROTOCOL:     Standard imaging protocol performed                RADIATION:     DLP: 1906mGy*cm          Automated exposure control was utilized to minimize radiation dose.       CONTRAST:   100cc Isovue 370 I.V.             FINDINGS:         There is hepatic steatosis.  The right lobe of the liver measures about 21 cm in    length which is       enlarged.  There is splenomegaly.  The spleen measures approximately 18.7 cm in     length.  The       patient has had a cholecystectomy.  There is no acute abnormality of the     pancreas or kidneys.             There is some slightly distended small bowel loops with fluid that may relate to    an enteritis.        Assessment of the bowel is limited by the lack of oral contrast.  There is no     bowel obstruction.        The appendix is grossly unremarkable.             The bladder does not appear unusual for the degree of distention.             There is a possible calcification with associated clip along the posterior     margin of the right lobe       of the liver inferiorly.  This is unchanged.             Atherosclerotic changes are noted involving the abdominal aorta.             Upper slices through the lower  chest reveal atelectasis within the lingular     area.             There are some degenerative changes involving the lumbar spine.             There are some changes on the upper slices along the posterior aspect of the     left breast that could       relate to previous surgery/treatment for known breast cancer.             CONCLUSION:         1. Hepatic steatosis.  There is some hepatomegaly.      2. Splenomegaly.  This might relate to underlying liver disease.      3. Atherosclerotic changes noted involving the abdominal aorta      4. Some slightly distended fluid-filled loops of small bowel that may be     reflective of an       enteritis.             DEREJE BLAIR MD             Electronically Signed and Approved By: DEREJE BLAIR MD on 3/12/2021 at 13:32                  ________________________________________________________________________    _________________________________            [facility]                PACS RADIOLOGY REPORT            Patient: LEILA SÁNCHEZ   Acct: #[id number]   Report: #[id number]            UNIT #: [id number]    DOS: 21       INSURANCE:[employer]   ORDER #:[id number]      [facility]     : 1960            PROVIDERS      ADMITTING:     ATTENDING: VANESSA BEST      FAMILY:  SHAY ROGERS   ORDERING:  VANESSA BEST         OTHER:    DICTATING:  Crys Wylie MD            REQ #:[id number]   EXAM:DSBMWT - DIG SCREEN BILAT VERONIKA w 3D APRIL      REASON FOR EXAM:  SCREENING      REASON FOR VISIT:  SCREENING            *******Signed******         PROCEDURE:   DIGITAL SCREENING BILATERAL MAMOGRAM WITH 3D TOMOSYNTHESIS             COMPARISON:   [facility], , MAMMOGRAM OPERATIVE SPECIMEN,     2017, 11:14.        [facility], , DIG SCREENING BILAT VERONIKA W 3D APRIL, 2018, 15:51.        [facility], , DIG SCREENING  BILAT VERONIKA W 3D APRIL,     2/07/2019, 14:31.        [facility], , DIG SCREENING BILAT VERONIKA W 3D APRIL,     5/12/2020, 15:12.             VIEWS:  BILATERAL CC AND MLO VIEWS WERE OBTAINED UTILIZING 3D TOMOSYNTHESIS AND     R2 CAD SOFTWARE             INDICATIONS:   SCREENING             FINDINGS:             Scar marker designates area of post lumpectomy changes upper outer left breast.     Fat necrosis       within the lumpectomy bed and stable position of left breast biopsy clip.        No suspicious mass, area of architectural distortion or suspicious     microcalcification is       identified.              CONCLUSION:             Benign mammogram. Suggest routine mammographic screening.             RECOMMENDATION(S):          ROUTINE MAMMOGRAM AND CLINICAL EVALUATION IN 12 MONTHS.               BIRADS:          DIAGNOSTIC CATEGORY 2--BENIGN FINDING               BREAST COMPOSITION:   Scattered areas fibroglandular density.             PLEASE NOTE:  A NORMAL MAMMOGRAM DOES NOT EXCLUDE THE POSSIBILITY OF BREAST     CANCER.       ANY CLINICALLY SUSPICIOUS PALPABLE LUMP SHOULD BE BIOPSIED.                Crys Wylie M.D.             Electronically Signed and Approved By: Crys Wylie M.D. on 5/10/2021 at     8:35                                            PAST, FAMILY   Past Medical History      Past Medical History:  Diabetes Type 2, High Cholesterol, Hypertension      Hematology/Oncology (F):  Anemia      Other Hematology History:        pancytopenia            Past Surgical History      Cholecystectomy            Family History      Family History:  No Family History            Social History      Lives independently:  Yes      Number of Children:  3      Occupation:              Tobacco Use      Tobacco status:  Current every day smoker, Former smoker      Smoking packs/day:  1            Substance Use      Substance use:  Denies use            REVIEW OF SYSTEMS      General:   Admits: Fatigue;          Denies: Appetite Change, Fever, Night Sweats, Weight Gain, Weight Loss      Eye:  Denies Blurred Vision, Denies Corrective Lenses, Denies Diplopia, Denies     Vision Changes      ENT:  Denies Headache, Denies Hearing Loss, Denies Hoarseness, Denies Sore     Throat      Cardiovascular:  Denies Chest Pain, Denies Palpitations      Respiratory:  Denies: Cough, Coughing Blood, Productive Cough, Shortness of Air,    Wheezing      Gastrointestinal:  Admits Diarrhea; Denies Bloody Stools, Denies Constipation,     Denies Nausea/Vomiting, Denies Problem Swallowing, Denies Unable to Control     Bowels      Genitourinary:  Denies Blood in Urine, Denies Incontinence, Denies Painful     Urination      Musculoskeletal:  Denies Back Pain, Denies Muscle Pain, Denies Painful Joints      Integumentary:  Denies Itching, Denies Lesions, Denies Rash      Neurologic:  Denies Dizziness, Denies Numbness\Tingling, Denies Seizures      Psychiatric:  Denies Anxiety, Denies Depression      Endocrine:  Denies Cold Intolerance, Denies Heat Intolerance      Hematologic/Lymphatic:  Denies Bruising, Denies Bleeding, Denies Enlarged Lymph     Nodes      Reproductive:  Denies: Menopause, Heavy Periods, Pregnant, Still Menstruating            VITAL SIGNS AND SCORES      Vitals      Weight 198 lbs 6.624 oz / 90. kg      Temperature 97.2 F / 36.22 C - Temporal      Pulse 74      Respirations 18      Blood Pressure 200/59 Sitting, Left Arm      Pulse Oximetry 97%, rm air            Pain Score      Experiencing any pain?:  Yes      Pain Scale Used:  Numerical      Pain Intensity:  7            Fatigue Score      Experiencing any fatigue?:  Yes      Fatigue (0-10 scale):  8            PT/OT Functional Screening      No needs identified            Speech Functional Screening      No needs identified            Rehab to be Ordered      Type of Referral to be Ordered:  No needs identified            EXAM      General appearance:  in  no apparent distress, cooperative, appears stated age.      HEENT: No pallor, no icterus, oral mucosa moist      Neck: Supple, trachea central-not deviated      Lymph nodes: none palpable peripherally      Cardiovascular: S1-S2 heard, no murmurs, no rubs, no gallops.      Breast exam:      Respiratory: Clear to auscultation bilaterally, no adventitious sounds      Abdomen/gastro: Soft, nontender, no palpable hepatosplenomegaly, bowel sounds     heard      Skin: No lesions, no rashes, no petechiae.      Extremities: No pedal edema, peripheral pulses felt, no clubbing      Musculoskeletal: Normal tone, no rigidity of muscles; range of motion intact      Spine: No deformities      Psychiatric: Alert and oriented x3, appropriate affect, intact judgment      Neurologic: Cranial nerves II through XII grossly intact, no gross neurological     deficits noted.            PREVENTION      Hx Influenza Vaccination:  Yes      Date Influenza Vaccine Given:  Oct 5, 2020      Influenza Vaccine Declined:  No      2 or More Falls in Past Year?:  No      Fall Past Year with Injury?:  No      Hx Pneumococcal Vaccination:  No      Encouraged to follow-up with:  PCP regarding preventative exams.      Chart initiated by      jaye moreira ma            ALLERGY/MEDS      Allergies      Coded Allergies:             NO KNOWN ALLERGIES (Unverified , 5/11/21)            Medications      Last Reconciled on 5/11/21 16:23 by VANESSA BEST      Anastrozole (Anastrozole) 1 Mg Tablet      1 MG PO QDAY for 90 Days, #90 TAB 3 Refills         Prov: VANESSA BEST onc         5/11/21       Sucralfate (Sucralfate) 1 Gm/10 Ml Oral.susp      5 ML PO QIDAC, #600 ML         Reported         5/11/21       Calcium Citrate/Vitamin D3 (Calcium Cit 250 + D) 1 Each Tablet      1 EACH PO BID for 30 Days, #60 TAB 5 Refills         Prov: VANESSA BEST onc         3/9/21       Pravastatin Sod (Pravachol*) 40 Mg Tablet      80 MG PO QDAY, TAB          Reported         9/9/20       Gabapentin (Gabapentin) 250 Mg/5 Ml Solution      300 MG PO TID, #540 ML 0 Refills         Reported         9/9/20       Carvedilol (Carvedilol) 6.25 Mg Tablet      6.25 MG PO BID, #60 TAB 0 Refills         Reported         8/19/20       Lisinopril-HCTZ 20-25 Mg (Lisinopril-HCTZ 20-25 Mg) 1 Each Tablet      1 TAB PO QDAY, #30 TAB 0 Refills         Reported         8/19/20       Insulin Degludec (Tresiba Flextouch U-100) 100 Unit/1 Ml Insuln.pen      50 UNITS SUBQ QDAY@17 for 30 Days, #1 INSULN.PEN         Reported         8/19/20       Ergocalciferol (Vitamin D2) 50,000 Units Capsule      88198 UNITS PO FR, #4 CAP         Reported         8/19/20       glipiZIDE (glipiZIDE) 10 Mg Tablet      10 MG PO QDAY, #30 TAB 0 Refills         Reported         8/19/20       metFORMIN ER (metFORMIN ER) 500 Mg Tab.er.24      1000 MG PO BID, #30 TAB.ER 0 Refills         Reported         1/19/15      Medications Reviewed:  Changes made to meds            IMPRESSION/PLAN      Impression      1) LEFT breast calcification: CNbx: DCIS. (1/11/17): size: 5 mm, solid,     cribiform, micropapillary, low to intermediate grade, no necrosis,     calcifications present: ER (+) 95%, [place] (+) 90%.             Ms. Airam Pool presents for follow up to review mammogram. Mammogram is benign.     She continues to take Anastrozole and is tolerating well. Offers no complaints     with the meds at this time.             2) Pancytopenia: At her last visit, she was noted to have pancytopenia. WBC      2.42, Hemoglobin 8.5, MCV 85, Platelet count 85,000 on 3/9/21. Recheck of lab     work was done on 3/12/21 with wbc of 2.46, hemoglobin of 8.9, platlet count     88,000. Iron studies were done with iron level of 29, iron sat of 6%, Ferritin     of 19. She was scheduled for Injectafer infusions and CT of abdomen and pelvis     were ordered. Mrs. Pool denies any GI bleeding. She does report frequent loose     stools.Denies any  "difficulty swallowing.             We discussed the need for possible bone marrow biopsy in the future after GI     evaluation is completed to rule out MDS or other underlying hematologic     malignancy.             3) Hepatosplenomegaly: CT of abdomen and pelvis  done on 3/12/21 showed fatty     liver with hepatospenomegaly possibly related to underlying liver disease.             4) Loose stools: Stool studies were negative. Taking Imodium for loose stools.     Denies any blood in stool. Patient is going for EGD / colonoscopy here in the     next week or so. Stool studies are negative.             5) Carotid Stenosis: Recent carotid dopplers completed: 80-99 % occlusion in the    left internal carotid and 60-70% in the right internal carotid. BP elevated     today of 207/59. Denies any vision changes. Denies any CP, left sided weakness     noted. She is on Lisinopril / HCTZ QD and Carvedilol 6.25 BID.             I have discussed with the patient, Ms. Pool the need she needs to go to ER for     the elevated BP, especially in the setting of  severe carotid stenosis in both     carotids. She does not wish to go to the ER for this. I have instructed she is     at extremely high risk of a stroke and consequences. I have also spoken to her     PCP: Ольга Bhagat NP as well just a few minutes ago. Ms. Pool is concerned     about her  and his health status currently stating: \"He is having vision     changes\". Mr. Pool is a patient of this clinic as well with history of bladder     cancer.            Diagnosis      Pancytopenia - D61.818            Notes      New Medications      * Sucralfate 1 GM/10 ML ORAL.SUSP: 5 ML PO QIDAC #600      Renewed Medications      * Anastrozole 1 MG TABLET: 1 MG PO QDAY 90 Days #90      New Diagnostics      * CBC With Auto Diff, 2 Week         Dx: Pancytopenia - D61.818      * CBC With Auto Diff, Month         Dx: Pancytopenia - D61.818            Plan      1) Discussed mammogram " report. Refill Anastrozole.             2) Discussed need for possible bone marrow biopsy to rule out MDS or other     hematologic abnormality after GI evaluation if GI evaluation is benign. She will    see Dr. Boss at the next visit. We will recheck CBC with diff in 2 weeks and 4    weeks.             3) 4) Has follow up with GI next week for EGD / colonoscopy.             5) Discussed hypertension / severe carotid stenosis with her PCP: ALEXIS Sosa. Patient and I discussed going to ER for herself for the hypertensive     urgency and at high risk for stroke since she has severe carotid stenosis.     Patient refused to go to ER against medical advice stating she does not want to     get COVID. Currently, no CP, SOA, left sideed weakness, headache. Patient     reports vascular canceled her appointment with them??. Discussed case with     Ольга Bhagat.             Patient will follow up with Dr. Boss in 1 month.            Pain      Pain Zero Today            Advanced Care Plan Discussion      Declines Discussion 1124F            Patient Education            Carotid Artery Stenosis      DI for Malignant Hypertension      DI for Pancytopenia      Patient Education Provided:  Yes            Electronically signed by VANESSA BEST onc  05/11/2021 16:23       Disclaimer: Converted document may not contain table formatting or lab diagrams. Please see [employer] for the authenticated document.

## 2021-05-28 NOTE — PROGRESS NOTES
Patient: AIRAM SÁNCHEZ     Acct: [id number]     Report: #[id number]  UNIT #: [id number]     : 1960    Encounter Date:2021  PRIMARY CARE: SHAY ROGERS  ***Signed***  --------------------------------------------------------------------------------------------------------------------  ADDENDUM: 21 1338          Addendum: VANESSA BEST onc on 3/24/21 @ 13:38      NURSE INTAKE      Visit Type      Established Patient Visit            Chief Complaint      BREAST CA            Referring Provider/Copies To      Referring Provider:  SHAY ROGERS      Primary Care Provider:  SHAY ROGERS      Copies To:   SHAY ROGERS            History and Present Illness      Past Oncology Illness History      1) LEFT breast calcification: CNbx: DCIS. (17): size: 5 mm, solid,     cribiform, micropapillary, low to intermediate grade, no necrosis,     calcifications present: ER (+) 95%, CT (+) 90%.             Treatment:             1) Left breast, excision with  needle lumpectomy: dx: (2/3/17) negative for     DCIS. Stage: pTis (DCIS).       2) Radiation consult for radiation: Radiation not recommended by Dr. Kaufman.     (3/6/2017)      3) Dexa scan: osteopenia in the femoral head. (3/3017)      4) Arimidex  1mg QD started. (17)      5) Mammogram was benign. (18) (19)            HPI - Oncology Interim      1) LEFT breast calcification: CNbx: DCIS. (17): size: 5 mm, solid,     cribiform, micropapillary, low to intermediate grade, no necrosis,     calcifications present: ER (+) 95%, CT (+) 90%.             Mrs. Airam Sánchez is a very pleasant lady who I have known for quite sometime with    her cancer diagnosis. Her  was recently diagnosed with bladder cancer and    will be receiving treatment here at the cancer center.             She reports she is doing well in the interim. She continues to take the     Anastrozole and is tolerating well. She offers no complaints at this time.  She     continues to take Calicum and Vitamin D supplementation.             She denies any persistent cough, SOA, or vision changes.             2) Anemia: Previous low hemoglobin of 11.4 in January 2020. She denies any     ongoing fatigue or GI symptoms. patient reports she has consult with GI in     April.            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[employer]der            Tobacco Use      Tobacco status:  Current every day smoker, Former smoker      Smoking packs/day:  1            Substance Use      Substance use:  Denies use            REVIEW OF SYSTEMS      General:  Denies: Appetite Change, Fatigue, Fever, Night Sweats, Weight Gain,     Weight Loss      Eye:  Denies Blurred Vision, Denies Corrective Lenses, Denies Diplopia, Denies     Vision Changes      ENT:  Denies Headache, Denies Hearing Loss, Denies Hoarseness, Denies Sore     Throat      Cardiovascular:  Denies Chest Pain, Denies Palpitations      Respiratory:  Denies: Cough, Coughing Blood, Productive Cough, Shortness of Air,    Wheezing      Gastrointestinal:  Admits Diarrhea; Denies Bloody Stools, Denies Constipation,     Denies Nausea/Vomiting, Denies Problem Swallowing, Denies Unable to Control Arjay    els      Genitourinary:  Denies Blood in Urine, Denies Incontinence, Denies Painful     Urination      Musculoskeletal:  Denies Back Pain, Denies Muscle Pain, Denies Painful Joints      Integumentary:  Denies Itching, Denies Lesions, Denies Rash      Neurologic:  Denies Dizziness, Denies Numbness\Tingling, Denies Seizures       Psychiatric:  Denies Anxiety, Denies Depression      Endocrine:  Denies Cold Intolerance, Denies Heat Intolerance      Hematologic/Lymphatic:  Denies Bruising, Denies Bleeding, Denies Enlarged Lymph     Nodes      Reproductive:  Denies: Menopause, Heavy Periods, Pregnant, Still Menstruating            VITAL SIGNS AND SCORES      Vitals      Weight 200 lbs 9.898 oz / 91 kg      Temperature 98.1 F / 36.72 C - Temporal      Pulse 68      Respirations 18      Blood Pressure 181/57 Sitting, Right Arm      Pulse Oximetry 100%, RM AIR            Pain Score      Experiencing any pain?:  No      Pain Scale Used:  Numerical      Pain Intensity:  0            Fatigue Score      Experiencing any fatigue?:  No      Fatigue (0-10 scale):  0 (none)            PT/OT Functional Screening      No needs identified            Speech Functional Screening      No needs identified            Rehab to be Ordered      Type of Referral to be Ordered:  No needs identified            EXAM      General appearance:  in no apparent distress, cooperative, appears stated age.      HEENT: No pallor, no icterus, oral mucosa moist      Neck: Supple, trachea central-not deviated      Lymph nodes: none palpable peripherally      Cardiovascular: S1-S2 heard, no murmurs, no rubs, no gallops.      Breast exam:      Respiratory: Clear to auscultation bilaterally, no adventitious sounds      Abdomen/gastro: Soft, nontender, no palpable hepatosplenomegaly, bowel sounds     heard      Skin: No lesions, no rashes, no petechiae.      Extremities: No pedal edema, peripheral pulses felt, no clubbing      Musculoskeletal: Normal tone, no rigidity of muscles; range of motion intact      Spine: No deformities      Psychiatric: Alert and oriented x3, appropriate affect, intact judgment      Neurologic: Cranial nerves II through XII grossly intact, no gross neurological     deficits noted.            PREVENTION      Hx Influenza Vaccination:  Yes      Date Influenza  Vaccine Given:  Oct 5, 2020      Influenza Vaccine Declined:  No      2 or More Falls in Past Year?:  No      Fall Past Year with Injury?:  No      Hx Pneumococcal Vaccination:  No      Encouraged to follow-up with:  PCP regarding preventative exams.      Chart initiated by      MICAH SOTO MA            ALLERGY/MEDS      Allergies      Coded Allergies:             NO KNOWN ALLERGIES (Unverified , 3/9/21)            Medications      Last Reconciled on 3/9/21 15:41 by VANESSA BEST      Anastrozole (Anastrozole) 1 Mg Tablet      1 MG PO QDAY for 90 Days, #90 TAB 3 Refills         Prov: VANESSA BEST onc         3/9/21       Calcium Citrate/Vitamin D3 (Calcium Cit 250 + D) 1 Each Tablet      1 EACH PO BID for 30 Days, #60 TAB 5 Refills         Prov: VANESSA BEST onc         3/9/21       Pravastatin Sod (Pravachol*) 40 Mg Tablet      80 MG PO QDAY, TAB         Reported         9/9/20       Gabapentin (Gabapentin) 250 Mg/5 Ml Solution      300 MG PO TID, #540 ML 0 Refills         Reported         9/9/20       Carvedilol (Carvedilol) 6.25 Mg Tablet      6.25 MG PO BID, #60 TAB 0 Refills         Reported         8/19/20       Lisinopril-HCTZ 20-25 Mg (Lisinopril-HCTZ 20-25 Mg) 1 Each Tablet      1 TAB PO QDAY, #30 TAB 0 Refills         Reported         8/19/20       Insulin Degludec (Tresiba Flextouch U-100) 100 Unit/1 Ml Insuln.pen      50 UNITS SUBQ QDAY@17 for 30 Days, #1 INSULN.PEN         Reported         8/19/20       Ergocalciferol (Vitamin D2) 50,000 Units Capsule      97325 UNITS PO FR, #4 CAP         Reported         8/19/20       glipiZIDE (glipiZIDE) 10 Mg Tablet      10 MG PO QDAY, #30 TAB 0 Refills         Reported         8/19/20       metFORMIN ER (metFORMIN ER) 500 Mg Tab.er.24      1000 MG PO BID, #30 TAB.ER 0 Refills         Reported         1/19/15       Omeprazole (priLOSEC) 20 Mg Capsule.dr      40 MG PO QDAY, #60 CAP 0 Refills         Reported         1/19/15      Medications  Reviewed:  No Changes made to meds            IMPRESSION/PLAN      Impression      1) LEFT breast calcification: CNbx: DCIS. (1/11/17): size: 5 mm, solid,     cribiform, micropapillary, low to intermediate grade, no necrosis,     calcifications present: ER (+) 95%, ND (+) 90%.             Mrs. Airam Pool is a very pleasant lady who I have known for quite sometime with    her cancer diagnosis. Her  was recently diagnosed with bladder cancer and    will be receiving treatment here at the cancer center.             She reports she is doing well in the interim. She continues to take the     Anastrozole and is tolerating well. She offers no complaints at this time. She     continues to take Calicum and Vitamin D supplementation.             She denies any persistent cough, SOA, or vision changes.             2) Anemia: Previous low hemoglobin of 11.4 in January 2020. She denies any     ongoing fatigue or GI symptoms. patient reports she has consult with GI in     April.             3) Pancytopenia: CBC done today this visit. WBC of 2.42 with ANC of 1480.     Hemoglobin of 8.5, platelet count of 78,000. Denies any GI blood loss or     bruising. Patient has known hepatosplenomegaly / fatty liver noted on prior CT     scan in 2015.             She has a follow up with GI in April.             We will initiate a pancytopenia evaluation with labs and recheck a CT abdomen     and pelvis to evaluate the fatty liver / hepatosplenomegaly. Pancytopenia may be    secondary to underlying history of fatty liver, hepatosplenomegaly. Can consider    a bone marrow biopsy.            Diagnosis      Anemia         Anemia due to chronic kidney disease, unspecified CKD stage - N18.9, D63.1         Chronic kidney disease stage: unspecified stage            DCIS (ductal carcinoma in situ)         Ductal carcinoma in situ (DCIS) of left breast - D05.12         Laterality: left            Pancytopenia - D61.818            Leukopenia -  D72.819            Hepatosplenomegaly - R16.2            Fatty liver - K76.0            Notes      Renewed Medications      * Calcium Citrate/Vitamin D3 (Calcium Cit 250 + D) 1 EACH TABLET: 1 EACH PO BID       30 Days #60      * Anastrozole 1 MG TABLET: 1 MG PO QDAY 90 Days #90      New Diagnostics      * CBC With Auto Diff, Routine         Dx: Anemia - D64.9      * CMP Comp Metabolic Panel, Routine         Dx: Anemia - D64.9      * LDH, Routine         Dx: Anemia - D64.9      * Screening Mammo, SCHEDULED PROCEDURE         Dx: DCIS (ductal carcinoma in situ) - D05.10      * Reticulocyte Count, Routine         Dx: Pancytopenia - D61.818      * Iron Profile, Routine         Dx: Pancytopenia - D61.818      * Ferritin Level, Routine         Dx: Pancytopenia - D61.818      * B12      Dx: Pancytopenia - D61.818      * Haptoglobin, Routine         Dx: Pancytopenia - D61.818      * Sed Rate, Routine         Dx: Pancytopenia - D61.818      * Copper Serum, Routine         Dx: Pancytopenia - D61.818      * Zinc  Serum, Routine         Dx: Pancytopenia - D61.818      * Erythropoietin, Routine         Dx: Pancytopenia - D61.818      * Thyroid Profile, Routine         Dx: Pancytopenia - D61.818      * SPEP with Immuno (IEPS), Routine         Dx: Pancytopenia - D61.818      * IMMUNOGLOBULIN QUANT IGAMQ, Routine         Dx: Pancytopenia - D61.818      * FREE KAPPA LAMBDA LT CHAINS QU, Routine         Dx: Pancytopenia - D61.818      * Path Review Peripheral Smear, Routine         Dx: Leukopenia - D72.819      * Abd/Pelv W/Wo Con CT, As Soon As Possible         Dx: Hepatosplenomegaly - R16.2      New Referrals      * Gastroenterology, As Soon As Possible         [facility]         Dx: Anemia due to chronic kidney disease, unspecified CKD stage - N18.9,        D63.1            Plan      1) Labs today with CBC, CMP LDH.             Pancytopenia evaluation.             Follow up in 2 weeks with NP.             Follow with GI as  scheduled for fatty liver, hepatosplenomegaly.            Pain      Pain Zero Today            Advanced Care Plan Discussion      Declines Discussion [id number]            Patient Education            Nonalcoholic Fatty Liver Disease      Patient Education Provided:  Yes            Electronically signed by VANESSA BEST onc  03/09/2021 15:42       Disclaimer: Converted document may not contain table formatting or lab diagrams. Please see [employer] [facility] for the authenticated document.

## 2021-05-28 NOTE — PROGRESS NOTES
Patient: LEILA POOL     Acct: [id number]     Report: #[id number]  UNIT #: [id number]     : 1960    Encounter Date:2020  PRIMARY CARE: SHAY ROGERS  ***Signed***  --------------------------------------------------------------------------------------------------------------------  NURSE INTAKE      Visit Type      Established Patient Visit            Chief Complaint      BREAST CA HERE FOR F/U            Referring Provider/Copies To      Primary Care Provider:  SHAY ROGERS      Copies To:   SHAY ROGERS            History and Present Illness      Past Oncology Illness History      1) LEFT breast calcification: CNbx: DCIS. (17): size: 5 mm, solid,     cribiform, micropapillary, low to intermediate grade, no necrosis,     calcifications present: ER (+) 95%, VA (+) 90%.             Treatment:             1) Left breast, excision with  needle lumpectomy: dx: (2/3/17) negative for     DCIS. Stage: pTis (DCIS).       2) Radiation consult for radiation: Radiation not recommended by Dr. Kaufman.     (3/6/2017)      3) Dexa scan: osteopenia in the femoral head. (3/3017)      4) Arimidex  1mg QD started. (17)      5) Mammogram was benign. (18) (19)            HPI - Oncology Interim      1) LEFT breast calcification: CNbx: DCIS. (17): size: 5 mm, solid,     cribiform, micropapillary, low to intermediate grade, no necrosis,     calcifications present: ER (+) 95%, VA (+) 90%.             Ms. Leila Pool presents for follow-up for left breast DCIS.  Patient is very     well-known to me I have seen her in the past many times, although she has been     lost to follow up the past year secondary to her  losing his insurance.     She does report she was being seen at the local [facility] clinic, but now    has Passport insurance and had to change back to our practice for her breast     cancer follow ups.             She reports her the [facility] provider was refilling  the the Arimidex.     She has been taking Arimidex since April of 2017 and has been tolerating well     other than a few aches and pains. She does reports she has been taking Calcium     and Vitamin D supplementation as well.             She is due for a dexa scan. Her last dexa was in 2017.             2) History of tobacco abuse: History of tobacco abuse for 40 + years. Ms. Pool     reports she is still smoke free and quit smoking several years ago. She had been    having lose dose CT screening here in the past. She reports she thinks she had a    CT scan at the [facility]. I obtain results of this and it was actually     a CT of abdomen. They do mention, she does have some atelectasis n the RML and     lingula. She denies any cough, hemoptysis or shortness of breath.             3) Diarrhea: She does report a history of chronic loose stools. On the abdominal    CT scan, there is mention questionable subtle inflammatory change around the     spelenic flexure concerning for focal area of colitis. She denies any GI     bleeding. She does report it is not uncommon for her to have as many as 6-7     stools a day.            Clinical Trial Participant      No            ECOG Performance Status      0            PAST, FAMILY   Past Medical History      Past Medical History:  Diabetes Type 2, High Cholesterol, Hypertension      Hematology/Oncology (F):  Anemia            Past Surgical History      Cholecystectomy            Family History      Family History:  No Family History            Social History      Lives independently:  Yes      Number of Children:  3      Occupation:              Tobacco Use      Tobacco status:  Current every day smoker, Former smoker      Smoking packs/day:  1            Substance Use      Substance use:  Denies use            REVIEW OF SYSTEMS      General:  Admits: Fatigue;          Denies: Appetite Change, Fever, Night Sweats, Weight Gain, Weight Loss      Eye:  Denies  Blurred Vision, Denies Corrective Lenses, Denies Diplopia, Denies     Vision Changes      ENT:  Denies Headache, Denies Hearing Loss, Denies Hoarseness, Denies Sore     Throat      Cardiovascular:  Denies Chest Pain, Denies Palpitations      Respiratory:  Denies: Cough, Coughing Blood, Productive Cough, Shortness of Air,    Wheezing      Gastrointestinal:  Admits Diarrhea (X 4 MOS); Denies Bloody Stools, Denies     Constipation, Denies Nausea/Vomiting, Denies Problem Swallowing, Denies Unable     to Control Bowels      Genitourinary:  Denies Blood in Urine, Denies Incontinence, Denies Painful     Urination      Musculoskeletal:  Denies Back Pain, Denies Muscle Pain, Denies Painful Joints      Integumentary:  Denies Itching, Denies Lesions, Denies Rash      Neurologic:  Denies Dizziness, Denies Numbness\Tingling, Denies Seizures      Psychiatric:  Denies Anxiety, Denies Depression      Endocrine:  Denies Cold Intolerance, Denies Heat Intolerance      Hematologic/Lymphatic:  Denies Bruising, Denies Bleeding, Denies Enlarged Lymph     Nodes      Reproductive:  Denies: Menopause, Heavy Periods, Pregnant, Still Menstruating            VITAL SIGNS AND SCORES      Vitals      Height 5 ft 3.00 in / 160.02 cm      Weight 187 lbs 2.729 oz / 84.9 kg      BSA 1.88 m2      BMI 33.2 kg/m2      Temperature 97.3 F / 36.28 C - Temporal      Pulse 73      Respirations 18      Blood Pressure 155/63 Sitting, Left Arm      Pulse Oximetry 98%, ROOM AIR            Pain Score      Experiencing any pain?:  No      Pain Scale Used:  Numerical      Pain Intensity:  0            Fatigue Score      Experiencing any fatigue?:  Yes      Fatigue (0-10 scale):  7            EXAM      General appearance:  in no apparent distress, cooperative, appears stated age.      HEENT: No pallor, no icterus, oral mucosa moist      Neck: Supple, trachea central-not deviated      Lymph nodes: none palpable peripherally      Cardiovascular: S1-S2 heard, no  murmurs, no rubs, no gallops.      Breast exam: No palpable masses or lumps to either breast or axillary area.       Respiratory: Clear to auscultation bilaterally, no adventitious sounds      Abdomen/gastro: Soft, nontender, no palpable hepatosplenomegaly, bowel sounds     heard      Skin: No lesions, no rashes, no petechiae.      Extremities: No pedal edema, peripheral pulses felt, no clubbing      Musculoskeletal: Normal tone, no rigidity of muscles; range of motion intact      Spine: No deformities      Psychiatric: Alert and oriented x3, appropriate affect, intact judgment      Neurologic: Cranial nerves II through XII grossly intact, no gross neurological     deficits noted.            PREVENTION      Date Influenza Vaccine Given:  Oct 1, 2018      Influenza Vaccine Declined:  No      2 or More Falls in Past Year?:  No      Fall Past Year with Injury?:  No      Hx Pneumococcal Vaccination:  No      Encouraged to follow-up with:  PCP regarding preventative exams.      Chart initiated by      ROSA HOOPER            ALLERGY/MEDS      Allergies      Coded Allergies:             NO KNOWN ALLERGIES (Unverified , 9/9/20)            Medications      Last Reconciled on 9/9/20 15:58 by VANESSA BEST      Calcium Citrate/Vitamin D3 (Calcium Cit 250 + D) 1 Each Tablet      1 EACH PO BID for 30 Days, #60 TAB 5 Refills         Prov: VANESSA BEST onc         9/9/20       Anastrozole (Anastrozole) 1 Mg Tablet      1 MG PO QDAY for 90 Days, #90 TAB 5 Refills         Prov: VANESSA BEST onc         9/9/20       Pravastatin Sod (Pravachol*) 40 Mg Tablet      80 MG PO QDAY, TAB         Reported         9/9/20       Gabapentin (Gabapentin) 250 Mg/5 Ml Solution      300 MG PO TID, #540 ML 0 Refills         Reported         9/9/20       Carvedilol (Carvedilol) 6.25 Mg Tablet      6.25 MG PO BID, #60 TAB 0 Refills         Reported         8/19/20       Lisinopril-HCTZ 20-25 Mg (Lisinopril-HCTZ 20-25 Mg) 1 Each  Tablet      1 TAB PO QDAY, #30 TAB 0 Refills         Reported         8/19/20       Insulin Degludec (Tresiba Flextouch U-100) 100 Unit/1 Ml Insuln.pen      50 UNITS SUBQ QDAY@17 for 30 Days, #1 INSULN.PEN         Reported         8/19/20       Ergocalciferol (Vitamin D2) 50,000 Units Capsule      85626 UNITS PO FR, #4 CAP         Reported         8/19/20       glipiZIDE (glipiZIDE) 10 Mg Tablet      10 MG PO QDAY, #30 TAB 0 Refills         Reported         8/19/20       metFORMIN ER (metFORMIN ER) 500 Mg Tab.er.24      1000 MG PO BID, #30 TAB.ER 0 Refills         Reported         1/19/15       Omeprazole (priLOSEC) 20 Mg Capsule.dr      40 MG PO QDAY, #60 CAP 0 Refills         Reported         1/19/15      Medications Reviewed:  Changes made to meds            IMPRESSION/PLAN      Impression      1) LEFT breast calcification: CNbx: DCIS. (1/11/17): size: 5 mm, solid,     cribiform, micropapillary, low to intermediate grade, no necrosis,     calcifications present: ER (+) 95%, AR (+) 90%.             Ms. Airam Pool presents for follow-up for left breast DCIS.  Patient is very     well-known to me I have seen her in the past many times, although she has been     lost to follow up the past year secondary to her  losing his insurance.     She does report she was being seen at the local [facility] clinic, but now    has Passport insurance and had to change back to our practice for her breast     cancer follow ups.             She reports her the [facility] provider was refilling the the Arimidex.     She has been taking Arimidex since April of 2017 and has been tolerating well     other than a few aches and pains. She does reports she has been taking Calcium     and Vitamin D supplementation as well.             She is due for a dexa scan. Her last dexa was in 2017.             2) History of tobacco abuse: History of tobacco abuse for 40 + years. Ms. Pool     reports she is still smoke free and quit  smoking several years ago. She had been    having lose dose CT screening here in the past. She reports she thinks she had a    CT scan at the [facility]. I obtain results of this and it was actually     a CT of abdomen. They do mention, she does have some atelectasis n the RML and     lingula. She denies any cough, hemoptysis or shortness of breath.             3) Diarrhea: She does report a history of chronic loose stools. On the abdominal    CT scan, there is mention questionable subtle inflammatory change around the     spelenic flexure concerning for focal area of colitis. She denies any GI     bleeding. She does report it is not uncommon for her to have as many as 6-7     stools a day. I did encourage her to try probiotics. I explain to her if the     diarrhea does not improve, then she will likely need a colonoscopy and GI     consult. She denies any GI bleeding or weight loss.            Diagnosis      Osteopenia         Osteopenia of neck of femur, unspecified laterality         Osteopenia location: femoral neck         Laterality: unspecified laterality            Notes      New Medications      * Gabapentin 250 MG/5 ML SOLUTION: 300 MG PO TID #540      * Pravastatin Sod (Pravachol*) 40 MG TABLET: 80 MG PO QDAY      * Calcium Citrate/Vitamin D3 (Calcium Cit 250 + D) 1 EACH TABLET: 1 EACH PO BID       30 Days #60      Renewed Medications      * Anastrozole 1 MG TABLET: 1 MG PO QDAY 90 Days #90      Discontinued Medications      * HYDROcodone-Acetaminophen 7.5-325 Mg 1 TAB TABLET: 1-2 TAB PO Q4-6H PRN PAIN       #30      New Diagnostics      * Bone Densitometry DEXA, SCHEDULED PROCEDURE         Dx: Osteopenia - M85.80            Plan      1) Refill Arimidex.             Continue Calcium and Vitamin D supplementations.             She is due for dexa scan now. This will be scheduled.             2) She will need a follow up CT chest low dose in February 2020.             3) If no improvement in loose  stools, I would recommend a GI consult with upper     and lower GI scopes.             Follow up in 3 months with NP.            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 Please see [employer] for the authenticated document.

## 2021-06-08 ENCOUNTER — HOSPITAL ENCOUNTER (OUTPATIENT)
Dept: CT IMAGING | Facility: HOSPITAL | Age: 61
Discharge: HOME | End: 2021-06-08
Admitting: INTERNAL MEDICINE

## 2021-06-08 DIAGNOSIS — D61.811 OTHER DRUG-INDUCED PANCYTOPENIA (HCC): Primary | ICD-10-CM

## 2021-06-08 DIAGNOSIS — K63.89 ILEOCECAL VALVE STENOSIS: ICD-10-CM

## 2021-06-08 PROCEDURE — 0 IOPAMIDOL PER 1 ML: Performed by: INTERNAL MEDICINE

## 2021-06-08 PROCEDURE — 74177 CT ABD & PELVIS W/CONTRAST: CPT

## 2021-06-08 RX ADMIN — IOPAMIDOL 100 ML: 755 INJECTION, SOLUTION INTRAVENOUS at 13:52

## 2021-06-11 PROBLEM — C50.919 BREAST CANCER (HCC): Status: ACTIVE | Noted: 2017-01-01

## 2021-06-11 PROBLEM — G62.9 NEUROPATHY: Status: ACTIVE | Noted: 2020-01-21

## 2021-06-11 PROBLEM — M25.512 BILATERAL SHOULDER PAIN: Status: ACTIVE | Noted: 2018-08-08

## 2021-06-11 PROBLEM — M25.511 BILATERAL SHOULDER PAIN: Status: ACTIVE | Noted: 2018-08-08

## 2021-06-11 PROBLEM — M19.019 ARTHROPATHY OF SHOULDER REGION: Status: ACTIVE | Noted: 2018-08-08

## 2021-06-11 PROBLEM — R16.1 SPLENOMEGALY: Status: ACTIVE | Noted: 2020-08-25

## 2021-06-11 PROBLEM — I70.90 ARTERIOSCLEROTIC VASCULAR DISEASE: Status: ACTIVE | Noted: 2019-12-11

## 2021-06-11 PROBLEM — R51.9 PAIN, HEAD: Status: ACTIVE | Noted: 2021-06-11

## 2021-06-11 PROBLEM — M19.90 ARTHRITIS: Status: ACTIVE | Noted: 2021-06-11

## 2021-06-11 PROBLEM — M67.919 DISORDER OF TENDON OF SHOULDER REGION: Status: ACTIVE | Noted: 2018-09-19

## 2021-06-11 PROBLEM — K21.9 GASTROESOPHAGEAL REFLUX DISEASE WITHOUT ESOPHAGITIS: Status: ACTIVE | Noted: 2021-03-31

## 2021-06-11 PROBLEM — E78.5 HYPERLIPIDEMIA: Status: ACTIVE | Noted: 2021-06-11

## 2021-06-11 PROBLEM — E87.6 HYPOKALEMIA: Status: ACTIVE | Noted: 2019-12-11

## 2021-06-11 PROBLEM — E11.9 DIABETES MELLITUS (HCC): Status: ACTIVE | Noted: 2021-06-11

## 2021-06-11 PROBLEM — Z72.0 TOBACCO ABUSE: Status: ACTIVE | Noted: 2020-12-23

## 2021-06-11 PROBLEM — M54.2 NECK PAIN: Status: ACTIVE | Noted: 2021-06-11

## 2021-06-11 PROBLEM — J30.9 ALLERGIC RHINITIS: Status: ACTIVE | Noted: 2021-06-11

## 2021-06-11 PROBLEM — I10 HYPERTENSION: Status: ACTIVE | Noted: 2021-06-11

## 2021-06-11 PROBLEM — E11.42 DIABETIC PERIPHERAL NEUROPATHY ASSOCIATED WITH TYPE 2 DIABETES MELLITUS: Status: ACTIVE | Noted: 2021-03-01

## 2021-06-11 PROBLEM — E78.5 HYPERLIPEMIA: Status: ACTIVE | Noted: 2021-06-11

## 2021-06-11 PROBLEM — K76.0 FATTY LIVER: Status: ACTIVE | Noted: 2021-06-11

## 2021-06-11 PROBLEM — Z78.0 POSTMENOPAUSE: Status: ACTIVE | Noted: 2021-03-31

## 2021-06-11 PROBLEM — I51.9 HEART DISEASE: Status: ACTIVE | Noted: 2021-06-11

## 2021-06-11 RX ORDER — DULOXETIN HYDROCHLORIDE 60 MG/1
CAPSULE, DELAYED RELEASE ORAL
COMMUNITY
End: 2021-06-23

## 2021-06-11 RX ORDER — RANITIDINE 150 MG/1
CAPSULE ORAL
COMMUNITY
End: 2021-06-23

## 2021-06-11 RX ORDER — INSULIN DEGLUDEC 100 U/ML
90 INJECTION, SOLUTION SUBCUTANEOUS DAILY
COMMUNITY
Start: 2021-04-05 | End: 2021-07-04

## 2021-06-11 RX ORDER — ANASTROZOLE 1 MG/1
1 TABLET ORAL DAILY
COMMUNITY
End: 2022-04-27

## 2021-06-11 RX ORDER — DAPAGLIFLOZIN 10 MG/1
TABLET, FILM COATED ORAL
COMMUNITY
End: 2021-06-23

## 2021-06-11 RX ORDER — GABAPENTIN 300 MG/1
600 CAPSULE ORAL 2 TIMES DAILY
COMMUNITY
Start: 2021-03-31

## 2021-06-11 RX ORDER — PRAVASTATIN SODIUM 80 MG/1
80 TABLET ORAL NIGHTLY
COMMUNITY
Start: 2021-03-31

## 2021-06-11 RX ORDER — INSULIN DEGLUDEC INJECTION 100 U/ML
100 INJECTION, SOLUTION SUBCUTANEOUS
COMMUNITY

## 2021-06-11 RX ORDER — PANTOPRAZOLE SODIUM 40 MG/1
40 TABLET, DELAYED RELEASE ORAL DAILY
COMMUNITY
Start: 2021-03-31 | End: 2022-08-02

## 2021-06-11 RX ORDER — TIZANIDINE 4 MG/1
TABLET ORAL
COMMUNITY
End: 2021-06-23

## 2021-06-11 RX ORDER — TRIAMTERENE AND HYDROCHLOROTHIAZIDE 37.5; 25 MG/1; MG/1
CAPSULE ORAL
COMMUNITY
End: 2021-06-23

## 2021-06-11 RX ORDER — SUCRALFATE ORAL 1 G/10ML
1 SUSPENSION ORAL 4 TIMES DAILY
COMMUNITY
End: 2022-01-27

## 2021-06-11 RX ORDER — CARVEDILOL 6.25 MG/1
6.25 TABLET ORAL 2 TIMES DAILY WITH MEALS
COMMUNITY
Start: 2021-03-31

## 2021-06-11 RX ORDER — LORATADINE 10 MG/1
TABLET ORAL
COMMUNITY
End: 2021-06-23

## 2021-06-11 RX ORDER — SYRING-NEEDL,DISP,INSUL,0.3 ML 31GX15/64"
SYRINGE, EMPTY DISPOSABLE MISCELLANEOUS
COMMUNITY
Start: 2021-01-29

## 2021-06-11 RX ORDER — OMEPRAZOLE 20 MG/1
CAPSULE, DELAYED RELEASE ORAL
COMMUNITY
End: 2022-01-27

## 2021-06-11 RX ORDER — DIPHENOXYLATE HYDROCHLORIDE AND ATROPINE SULFATE 2.5; .025 MG/1; MG/1
TABLET ORAL
COMMUNITY
End: 2021-06-23

## 2021-06-11 RX ORDER — TRAMADOL HYDROCHLORIDE 50 MG/1
TABLET ORAL
COMMUNITY
End: 2021-06-23

## 2021-06-11 RX ORDER — INSULIN DEGLUDEC INJECTION 100 U/ML
100 INJECTION, SOLUTION SUBCUTANEOUS
COMMUNITY
Start: 2021-04-13

## 2021-06-11 RX ORDER — HYDROCODONE BITARTRATE AND ACETAMINOPHEN 7.5; 325 MG/1; MG/1
TABLET ORAL
COMMUNITY
End: 2021-06-23

## 2021-06-11 RX ORDER — SUCRALFATE 1 G/1
1 TABLET ORAL 4 TIMES DAILY
COMMUNITY
Start: 2021-04-07 | End: 2022-04-04 | Stop reason: SDUPTHER

## 2021-06-11 RX ORDER — ATORVASTATIN CALCIUM 20 MG/1
TABLET, FILM COATED ORAL
COMMUNITY
End: 2021-06-23

## 2021-06-11 RX ORDER — GEMFIBROZIL 600 MG/1
TABLET, FILM COATED ORAL
COMMUNITY
End: 2022-01-28

## 2021-06-11 RX ORDER — GLIPIZIDE 10 MG/1
TABLET ORAL
COMMUNITY
End: 2021-10-12 | Stop reason: ALTCHOICE

## 2021-06-11 RX ORDER — LISINOPRIL AND HYDROCHLOROTHIAZIDE 25; 20 MG/1; MG/1
1 TABLET ORAL 2 TIMES DAILY
COMMUNITY
Start: 2021-03-31

## 2021-06-11 RX ORDER — BUTALBITAL AND ACETAMINOPHEN 300; 50 MG/1; MG/1
TABLET ORAL
COMMUNITY
End: 2021-06-23

## 2021-06-11 RX ORDER — EMPAGLIFLOZIN 10 MG/1
TABLET, FILM COATED ORAL
COMMUNITY
End: 2021-06-23

## 2021-06-11 RX ORDER — ERGOCALCIFEROL 1.25 MG/1
CAPSULE ORAL
COMMUNITY
End: 2021-06-23

## 2021-06-14 ENCOUNTER — CLINICAL SUPPORT (OUTPATIENT)
Dept: ONCOLOGY | Facility: HOSPITAL | Age: 61
End: 2021-06-14

## 2021-06-14 ENCOUNTER — OFFICE VISIT (OUTPATIENT)
Dept: ONCOLOGY | Facility: HOSPITAL | Age: 61
End: 2021-06-14

## 2021-06-14 ENCOUNTER — DOCUMENTATION (OUTPATIENT)
Dept: ONCOLOGY | Facility: HOSPITAL | Age: 61
End: 2021-06-14

## 2021-06-14 VITALS
BODY MASS INDEX: 35.66 KG/M2 | WEIGHT: 201.28 LBS | SYSTOLIC BLOOD PRESSURE: 174 MMHG | DIASTOLIC BLOOD PRESSURE: 60 MMHG | OXYGEN SATURATION: 99 % | RESPIRATION RATE: 18 BRPM | HEART RATE: 66 BPM | TEMPERATURE: 97 F

## 2021-06-14 DIAGNOSIS — C50.912 MALIGNANT NEOPLASM OF LEFT FEMALE BREAST, UNSPECIFIED ESTROGEN RECEPTOR STATUS, UNSPECIFIED SITE OF BREAST (HCC): ICD-10-CM

## 2021-06-14 DIAGNOSIS — D61.811 OTHER DRUG-INDUCED PANCYTOPENIA (HCC): ICD-10-CM

## 2021-06-14 DIAGNOSIS — D61.818 OTHER PANCYTOPENIA (HCC): Primary | ICD-10-CM

## 2021-06-14 DIAGNOSIS — D50.0 IRON DEFICIENCY ANEMIA DUE TO CHRONIC BLOOD LOSS: Primary | ICD-10-CM

## 2021-06-14 DIAGNOSIS — D69.6 THROMBOCYTOPENIA (HCC): ICD-10-CM

## 2021-06-14 LAB
BASOPHILS # BLD AUTO: 0.03 10*3/MM3 (ref 0–0.2)
BASOPHILS NFR BLD AUTO: 0.8 % (ref 0–1.5)
DEPRECATED RDW RBC AUTO: 54 FL (ref 37–54)
EOSINOPHIL # BLD AUTO: 0.18 10*3/MM3 (ref 0–0.4)
EOSINOPHIL NFR BLD AUTO: 5.1 % (ref 0.3–6.2)
ERYTHROCYTE [DISTWIDTH] IN BLOOD BY AUTOMATED COUNT: 15.5 % (ref 12.3–15.4)
HCT VFR BLD AUTO: 35 % (ref 34–46.6)
HGB BLD-MCNC: 11.5 G/DL (ref 12–15.9)
IMM GRANULOCYTES # BLD AUTO: 0.01 10*3/MM3 (ref 0–0.05)
IMM GRANULOCYTES NFR BLD AUTO: 0.3 % (ref 0–0.5)
LYMPHOCYTES # BLD AUTO: 0.73 10*3/MM3 (ref 0.7–3.1)
LYMPHOCYTES NFR BLD AUTO: 20.5 % (ref 19.6–45.3)
MCH RBC QN AUTO: 30.9 PG (ref 26.6–33)
MCHC RBC AUTO-ENTMCNC: 32.9 G/DL (ref 31.5–35.7)
MCV RBC AUTO: 94.1 FL (ref 79–97)
MONOCYTES # BLD AUTO: 0.23 10*3/MM3 (ref 0.1–0.9)
MONOCYTES NFR BLD AUTO: 6.5 % (ref 5–12)
NEUTROPHILS NFR BLD AUTO: 2.38 10*3/MM3 (ref 1.7–7)
NEUTROPHILS NFR BLD AUTO: 66.8 % (ref 42.7–76)
PLATELET # BLD AUTO: 76 10*3/MM3 (ref 140–450)
PMV BLD AUTO: 10.1 FL (ref 6–12)
RBC # BLD AUTO: 3.72 10*6/MM3 (ref 3.77–5.28)
WBC # BLD AUTO: 3.56 10*3/MM3 (ref 3.4–10.8)

## 2021-06-14 PROCEDURE — 99214 OFFICE O/P EST MOD 30 MIN: CPT | Performed by: INTERNAL MEDICINE

## 2021-06-14 PROCEDURE — 36415 COLL VENOUS BLD VENIPUNCTURE: CPT

## 2021-06-14 PROCEDURE — G0463 HOSPITAL OUTPT CLINIC VISIT: HCPCS | Performed by: INTERNAL MEDICINE

## 2021-06-14 PROCEDURE — 85025 COMPLETE CBC W/AUTO DIFF WBC: CPT | Performed by: INTERNAL MEDICINE

## 2021-06-14 NOTE — PROGRESS NOTES
Chief Complaint  Breast Cancer and Follow-up    Ольга Bhagat, ALEXIS Bhagat, Ольга MANRIQUE, APRN    Intent of Referral: Curative    Subjective          Airam Pool presents to [facility] for follow-up labs for anemia.  History of Present Illness    The pt comes in with her  to discuss her labs. She was recently noted to have low plts and anemia.  However, her hgb improved signifancetly with IV iron.  I reviewed her CT scan from 6/8/2021.  This is concerning only for hepatic steatosis, splenomegaly, and gastroesophageal varices consistent with portal hypertension.  I explained to the patient that she could have some chronic bleeding from esophageal varices.  She continues to follow-up with gastroenterology.    Cancer Staging  No matching staging information was found for the patient.    Oncology/Hematology History   Breast cancer (CMS/HCC)   1/1/2017 Initial Diagnosis    Breast cancer (CMS/HCC)         Review of Systems   Constitutional: Negative for activity change, appetite change, fatigue and fever.   HENT: Negative for mouth sores and swollen glands.    Eyes: Negative for blurred vision and visual disturbance.   Respiratory: Negative for shortness of breath.    Cardiovascular: Negative for chest pain and leg swelling.   Gastrointestinal: Negative for abdominal distention, abdominal pain and blood in stool.   Endocrine: Negative for cold intolerance and heat intolerance.   Genitourinary: Negative for breast discharge, breast lump and breast pain.   Musculoskeletal: Negative for gait problem, joint swelling and myalgias.   Skin: Negative for skin lesions.   Neurological: Negative for light-headedness and headache.   Hematological: Negative for adenopathy. Does not bruise/bleed easily.   Psychiatric/Behavioral: Negative for sleep disturbance.     Current Outpatient Medications on File Prior to Visit   Medication Sig Dispense Refill   • gabapentin (NEURONTIN) 300 MG  "capsule Take 600 mg by mouth 3 (Three) Times a Day.     • Insulin Degludec (TRESIBA) 100 UNIT/ML solution injection Inject 90 Units under the skin into the appropriate area as directed Daily.     • Insulin Syringe-Needle U-100 (BD Veo Insulin Syringe U/F) 31G X 15/64\" 1 ML misc USE TO INJECT INTO THE SKIN AS NEEDED     • pantoprazole (PROTONIX) 40 MG EC tablet Take 40 mg by mouth Daily.     • pravastatin (PRAVACHOL) 80 MG tablet Take 80 mg by mouth Daily.     • sucralfate (CARAFATE) 1 GM/10ML suspension Take 1 g by mouth 4 (Four) Times a Day.     • anastrozole (ARIMIDEX) 1 MG tablet anastrozole 1 mg tablet   Take 1 tablet every day by oral route.     • carvedilol (COREG) 6.25 MG tablet Take 6.25 mg by mouth 2 (Two) Times a Day With Meals.     • gemfibrozil (LOPID) 600 MG tablet gemfibrozil 600 mg oral tablet take 1 tablet (600 mg) by oral route 2 times per day 30 minutes before morning and evening meal   Suspended     • glipizide (GLUCOTROL) 10 MG tablet glipizide 10 mg tablet   Take 1 tablet every day by oral route.     • insulin degludec (Tresiba FlexTouch) 100 UNIT/ML solution pen-injector injection Tresiba FlexTouch U-100 100 unit/mL (3 mL) subcutaneous insulin pen inject by subcutaneous route as per insulin protocol   Active     • lisinopril-hydrochlorothiazide (PRINZIDE,ZESTORETIC) 20-25 MG per tablet Take 1 tablet by mouth 2 (Two) Times a Day.     • metFORMIN (GLUCOPHAGE) 1000 MG tablet Every 12 (Twelve) Hours.     • omeprazole (priLOSEC) 20 MG capsule omeprazole 20 mg oral capsule,delayed release(DR/EC) take 1 capsule by oral route 2 times a day for 10 days   Suspended     • sucralfate (CARAFATE) 1 g tablet TAKE 1 TABLET BY MOUTH 4 TIMES DAILY ON AN EMPTY STOMACH ONE HOUR BEFORE EACH MEAL     • Tresiba 100 UNIT/ML solution injection INJECT 0.9MLS SUBCUTANEOUSLY EVERY MORNING FOR 90 DAYS       No current facility-administered medications on file prior to visit.       No Known Allergies  Past Medical History: "   Diagnosis Date   • Breast cancer (CMS/HCC)     LEFT BREAST   • Diabetes mellitus, type II (CMS/HCC)    • High cholesterol      Past Surgical History:   Procedure Laterality Date   • BREAST BIOPSY     • BREAST LUMPECTOMY     • SPINE SURGERY     • TUBAL ABDOMINAL LIGATION       Social History     Socioeconomic History   • Marital status:      Spouse name: Not on file   • Number of children: Not on file   • Years of education: Not on file   • Highest education level: Not on file   Tobacco Use   • Smoking status: Current Every Day Smoker     Packs/day: 1.00   • Smokeless tobacco: Never Used   • Tobacco comment: 25/50 PACK YEARS/CONSIDERED QUITTING   Vaping Use   • Vaping Use: Never used   Substance and Sexual Activity   • Alcohol use: Never   • Drug use: Never     History reviewed. No pertinent family history.    There is no immunization history on file for this patient.    Objective   Physical Exam   General: Alert, cooperative, no acute distress, well appearing  Eyes: Anicteric sclera, PERRLA  Respiratory: CTAB, normal respiratory effort  Abdomen: Normal active bowel sounds, no tenderness, no distention  Cardiovascular: RRR, no murmur, no lower extremity edema  Skin: Normal tone, no rash, no lesions  Psychiatric: Appropriate affect, intact judgment  Neurologic: No focal sensory or motor deficits, no weakness, numbness, dizziness  Musculoskeletal: Normal muscle strength and tone  Extremities: No clubbing, cyanosis, or deformities      Vitals:    06/14/21 0916   BP: 174/60   Pulse: 66   Resp: 18   Temp: 97 °F (36.1 °C)   TempSrc: Temporal   SpO2: 99%   Weight: 91.3 kg (201 lb 4.5 oz)   PainSc:   8   PainLoc: Back     ECOG score: 0         Fall Risk Assessment was completed, and patient is at low risk for falls.  PHQ-9 Total Score: 0                  Result Review :   The following data was reviewed by: Asiya Boss MD PhD on 06/14/2021:  Lab Results   Component Value Date    HGB 11.5 (L) 06/14/2021    HCT  35.0 06/14/2021    MCV 94.1 06/14/2021    PLT 76 (L) 06/14/2021    WBC 3.56 06/14/2021    NEUTROABS 2.38 06/14/2021    LYMPHSABS 0.73 06/14/2021    MONOSABS 0.23 06/14/2021    EOSABS 0.18 06/14/2021    BASOSABS 0.03 06/14/2021     Lab Results   Component Value Date    BUN 22 (H) 06/23/2021    CREATININE 1.14 (H) 05/11/2021     05/11/2021    K 3.9 05/11/2021    CL 99 05/11/2021    CO2 28 05/11/2021    CALCIUM 9.1 05/11/2021    PROTEINTOT 7.1 05/11/2021    ALBUMIN 4.2 05/11/2021    BILITOT 0.42 05/11/2021    ALKPHOS 74 05/11/2021    AST 22 05/11/2021    ALT 24 05/11/2021             Assessment and Plan    Diagnoses and all orders for this visit:    1. Iron deficiency anemia due to chronic blood loss (Primary)  -     Peripheral Blood Smear; Future  -     Iron Profile; Future  -     Ferritin; Future  -     CBC & Differential; Future  -     Haptoglobin; Future  -     TSH; Future    2. Malignant neoplasm of left female breast, unspecified estrogen receptor status, unspecified site of breast (CMS/HCC)  -     Peripheral Blood Smear; Future  -     Iron Profile; Future  -     Ferritin; Future  -     CBC & Differential; Future  -     Haptoglobin; Future  -     TSH; Future    3. Thrombocytopenia (CMS/HCC)      Reviewed the results with the patient CT abdomen/pelvis that was done on 6/8/2021.  This showed splenomegaly and gastroesophageal varices consistent with portal hypertension.  There are no other abnormalities noted except for hepatic steatosis.  I explained to the patient that portal hypertension splenomegaly explain her thrombocytopenia.  She is followed by gastroenterology.  She will return to clinic in 1 month with repeat labs as above.          Patient Follow Up: 1 mo  Patient was given instructions and counseling regarding her condition or for health maintenance advice. Please see specific information pulled into the AVS if appropriate.     Asiya Boss MD PhD    6/27/2021

## 2021-06-16 ENCOUNTER — TELEPHONE (OUTPATIENT)
Dept: GASTROENTEROLOGY | Facility: CLINIC | Age: 61
End: 2021-06-16

## 2021-06-16 NOTE — TELEPHONE ENCOUNTER
----- Message from Star Fischer MD sent at 6/10/2021  8:14 AM EDT -----  Notify of normal CT results

## 2021-06-16 NOTE — TELEPHONE ENCOUNTER
Patient correct phone number is [phone], I called and left her a detailed message of CT results, notified to call back with any questions or concerns

## 2021-06-23 ENCOUNTER — TRANSCRIBE ORDERS (OUTPATIENT)
Dept: LAB | Facility: HOSPITAL | Age: 61
End: 2021-06-23

## 2021-06-23 ENCOUNTER — LAB (OUTPATIENT)
Dept: LAB | Facility: HOSPITAL | Age: 61
End: 2021-06-23

## 2021-06-23 DIAGNOSIS — Z01.812 PRE-OPERATIVE LABORATORY EXAMINATION: Primary | ICD-10-CM

## 2021-06-23 DIAGNOSIS — Z01.812 PRE-OPERATIVE LABORATORY EXAMINATION: ICD-10-CM

## 2021-06-23 LAB — BUN SERPL-MCNC: 22 MG/DL (ref 6–20)

## 2021-06-23 PROCEDURE — 36415 COLL VENOUS BLD VENIPUNCTURE: CPT

## 2021-06-23 PROCEDURE — U0003 INFECTIOUS AGENT DETECTION BY NUCLEIC ACID (DNA OR RNA); SEVERE ACUTE RESPIRATORY SYNDROME CORONAVIRUS 2 (SARS-COV-2) (CORONAVIRUS DISEASE [COVID-19]), AMPLIFIED PROBE TECHNIQUE, MAKING USE OF HIGH THROUGHPUT TECHNOLOGIES AS DESCRIBED BY CMS-2020-01-R: HCPCS | Performed by: PHYSICIAN ASSISTANT

## 2021-06-23 PROCEDURE — 84520 ASSAY OF UREA NITROGEN: CPT

## 2021-06-23 PROCEDURE — 82540 ASSAY OF CREATINE: CPT

## 2021-06-23 PROCEDURE — C9803 HOPD COVID-19 SPEC COLLECT: HCPCS | Performed by: PHYSICIAN ASSISTANT

## 2021-06-29 LAB — CREATINE SERPL-MCNC: 1.5 MG/DL (ref 0.1–1)

## 2021-07-14 PROBLEM — D05.12 DUCTAL CARCINOMA IN SITU (DCIS) OF LEFT BREAST: Status: ACTIVE | Noted: 2017-01-01

## 2021-07-15 ENCOUNTER — LAB (OUTPATIENT)
Dept: ONCOLOGY | Facility: HOSPITAL | Age: 61
End: 2021-07-15

## 2021-07-15 DIAGNOSIS — C50.912 MALIGNANT NEOPLASM OF LEFT FEMALE BREAST, UNSPECIFIED ESTROGEN RECEPTOR STATUS, UNSPECIFIED SITE OF BREAST (HCC): ICD-10-CM

## 2021-07-15 DIAGNOSIS — D50.0 IRON DEFICIENCY ANEMIA DUE TO CHRONIC BLOOD LOSS: ICD-10-CM

## 2021-07-15 LAB
BASOPHILS # BLD AUTO: 0.03 10*3/MM3 (ref 0–0.2)
BASOPHILS NFR BLD AUTO: 0.9 % (ref 0–1.5)
DEPRECATED RDW RBC AUTO: 48.6 FL (ref 37–54)
DEPRECATED RDW RBC AUTO: 48.7 FL (ref 37–54)
EOSINOPHIL # BLD AUTO: 0.1 10*3/MM3 (ref 0–0.4)
EOSINOPHIL # BLD MANUAL: 0.14 10*3/MM3 (ref 0–0.4)
EOSINOPHIL NFR BLD AUTO: 3 % (ref 0.3–6.2)
EOSINOPHIL NFR BLD MANUAL: 4 % (ref 0.3–6.2)
ERYTHROCYTE [DISTWIDTH] IN BLOOD BY AUTOMATED COUNT: 13.9 % (ref 12.3–15.4)
ERYTHROCYTE [DISTWIDTH] IN BLOOD BY AUTOMATED COUNT: 13.9 % (ref 12.3–15.4)
FERRITIN SERPL-MCNC: 396.7 NG/ML (ref 13–150)
HAPTOGLOB SERPL-MCNC: 206 MG/DL (ref 30–200)
HCT VFR BLD AUTO: 33.9 % (ref 34–46.6)
HCT VFR BLD AUTO: 34.4 % (ref 34–46.6)
HGB BLD-MCNC: 11 G/DL (ref 12–15.9)
HGB BLD-MCNC: 11 G/DL (ref 12–15.9)
IMM GRANULOCYTES # BLD AUTO: 0.01 10*3/MM3 (ref 0–0.05)
IMM GRANULOCYTES NFR BLD AUTO: 0.3 % (ref 0–0.5)
IRON 24H UR-MRATE: 53 MCG/DL (ref 37–145)
IRON SATN MFR SERPL: 16 % (ref 20–50)
LYMPHOCYTES # BLD AUTO: 0.8 10*3/MM3 (ref 0.7–3.1)
LYMPHOCYTES # BLD MANUAL: 1.02 10*3/MM3 (ref 0.7–3.1)
LYMPHOCYTES NFR BLD AUTO: 23.9 % (ref 19.6–45.3)
LYMPHOCYTES NFR BLD MANUAL: 29 % (ref 19.6–45.3)
LYMPHOCYTES NFR BLD MANUAL: 9 % (ref 5–12)
MCH RBC QN AUTO: 30.7 PG (ref 26.6–33)
MCH RBC QN AUTO: 31.2 PG (ref 26.6–33)
MCHC RBC AUTO-ENTMCNC: 32 G/DL (ref 31.5–35.7)
MCHC RBC AUTO-ENTMCNC: 32.4 G/DL (ref 31.5–35.7)
MCV RBC AUTO: 96 FL (ref 79–97)
MCV RBC AUTO: 96.1 FL (ref 79–97)
MONOCYTES # BLD AUTO: 0.18 10*3/MM3 (ref 0.1–0.9)
MONOCYTES # BLD AUTO: 0.32 10*3/MM3 (ref 0.1–0.9)
MONOCYTES NFR BLD AUTO: 5.4 % (ref 5–12)
NEUTROPHILS # BLD AUTO: 2.05 10*3/MM3 (ref 1.7–7)
NEUTROPHILS NFR BLD AUTO: 2.23 10*3/MM3 (ref 1.7–7)
NEUTROPHILS NFR BLD AUTO: 66.5 % (ref 42.7–76)
NEUTROPHILS NFR BLD MANUAL: 58 % (ref 42.7–76)
PATHOLOGY REVIEW: YES
PLATELET # BLD AUTO: 100 10*3/MM3 (ref 140–450)
PLATELET # BLD AUTO: 87 10*3/MM3 (ref 140–450)
PMV BLD AUTO: 10.3 FL (ref 6–12)
PMV BLD AUTO: 9.9 FL (ref 6–12)
RBC # BLD AUTO: 3.53 10*6/MM3 (ref 3.77–5.28)
RBC # BLD AUTO: 3.58 10*6/MM3 (ref 3.77–5.28)
RBC MORPH BLD: NORMAL
SMALL PLATELETS BLD QL SMEAR: NORMAL
TIBC SERPL-MCNC: 329 MCG/DL (ref 298–536)
TRANSFERRIN SERPL-MCNC: 221 MG/DL (ref 200–360)
TSH SERPL DL<=0.05 MIU/L-ACNC: 4.23 UIU/ML (ref 0.27–4.2)
WBC # BLD AUTO: 3.35 10*3/MM3 (ref 3.4–10.8)
WBC # BLD AUTO: 3.53 10*3/MM3 (ref 3.4–10.8)
WBC MORPH BLD: NORMAL

## 2021-07-15 PROCEDURE — 85025 COMPLETE CBC W/AUTO DIFF WBC: CPT

## 2021-07-15 PROCEDURE — 84466 ASSAY OF TRANSFERRIN: CPT

## 2021-07-15 PROCEDURE — 85007 BL SMEAR W/DIFF WBC COUNT: CPT

## 2021-07-15 PROCEDURE — 83010 ASSAY OF HAPTOGLOBIN QUANT: CPT

## 2021-07-15 PROCEDURE — 83540 ASSAY OF IRON: CPT

## 2021-07-15 PROCEDURE — 84443 ASSAY THYROID STIM HORMONE: CPT

## 2021-07-15 PROCEDURE — 82728 ASSAY OF FERRITIN: CPT

## 2021-07-15 PROCEDURE — 36415 COLL VENOUS BLD VENIPUNCTURE: CPT

## 2021-07-15 RX ORDER — ASPIRIN 81 MG/1
81 TABLET ORAL DAILY
COMMUNITY

## 2021-07-16 ENCOUNTER — OFFICE VISIT (OUTPATIENT)
Dept: ONCOLOGY | Facility: HOSPITAL | Age: 61
End: 2021-07-16

## 2021-07-16 ENCOUNTER — NURSE NAVIGATOR (OUTPATIENT)
Dept: ONCOLOGY | Facility: HOSPITAL | Age: 61
End: 2021-07-16

## 2021-07-16 VITALS
RESPIRATION RATE: 18 BRPM | BODY MASS INDEX: 35.26 KG/M2 | TEMPERATURE: 96.9 F | OXYGEN SATURATION: 99 % | SYSTOLIC BLOOD PRESSURE: 184 MMHG | HEART RATE: 63 BPM | DIASTOLIC BLOOD PRESSURE: 63 MMHG | WEIGHT: 199.08 LBS

## 2021-07-16 DIAGNOSIS — R16.1 SPLENOMEGALY: ICD-10-CM

## 2021-07-16 DIAGNOSIS — D69.6 THROMBOCYTOPENIA (HCC): Primary | ICD-10-CM

## 2021-07-16 DIAGNOSIS — C50.919 MALIGNANT NEOPLASM OF FEMALE BREAST, UNSPECIFIED ESTROGEN RECEPTOR STATUS, UNSPECIFIED LATERALITY, UNSPECIFIED SITE OF BREAST (HCC): ICD-10-CM

## 2021-07-16 DIAGNOSIS — D63.8 ANEMIA OF CHRONIC DISEASE: ICD-10-CM

## 2021-07-16 LAB
CYTO UR: NORMAL
LAB AP CASE REPORT: NORMAL
LAB AP CLINICAL INFORMATION: NORMAL
PATH REPORT.FINAL DX SPEC: NORMAL
PATH REPORT.GROSS SPEC: NORMAL

## 2021-07-16 PROCEDURE — 99214 OFFICE O/P EST MOD 30 MIN: CPT | Performed by: INTERNAL MEDICINE

## 2021-07-16 PROCEDURE — G0463 HOSPITAL OUTPT CLINIC VISIT: HCPCS | Performed by: INTERNAL MEDICINE

## 2021-07-16 NOTE — PROGRESS NOTES
Nurse Navigator Evaluation     07/16/2021  Airam Pool      Care Plan Delivery Method: Patient Visit    Care Plan Delivered to PCP Via: Fax    Referrals: Smoking Cessation    Education Material Provided on: Care Plan, Diagnosis, Smoking Cessation, Support Groups, Survivorship and Late and Long Term Effects of Treatment book.    [employer] Ac[employer] Tool:  1    Diagnosis:   DCIS of Left Breast    New Cancer Screening:   None     Nurse Navigator Intervention:   Reviewed Survivorship Care plan with patient. Reviewed educational material. All questions answered and no further concerns.    Time Spent Evaluating: I spent 40 minutes caring for Airam on this date of service. This time includes time spent by me in the following activities:preparing for the visit and counseling and educating the patient/family/caregiver    Nayeli Page RN  7/16/2021

## 2021-07-21 ENCOUNTER — TRANSCRIBE ORDERS (OUTPATIENT)
Dept: LAB | Facility: HOSPITAL | Age: 61
End: 2021-07-21

## 2021-07-21 DIAGNOSIS — Z01.818 PRE-OP TESTING: Primary | ICD-10-CM

## 2021-07-27 ENCOUNTER — OFFICE VISIT (OUTPATIENT)
Dept: GASTROENTEROLOGY | Facility: CLINIC | Age: 61
End: 2021-07-27

## 2021-07-27 VITALS
RESPIRATION RATE: 18 BRPM | OXYGEN SATURATION: 98 % | SYSTOLIC BLOOD PRESSURE: 183 MMHG | HEIGHT: 65 IN | WEIGHT: 199.2 LBS | HEART RATE: 64 BPM | DIASTOLIC BLOOD PRESSURE: 56 MMHG | BODY MASS INDEX: 33.19 KG/M2

## 2021-07-27 DIAGNOSIS — K21.9 GASTROESOPHAGEAL REFLUX DISEASE WITHOUT ESOPHAGITIS: ICD-10-CM

## 2021-07-27 DIAGNOSIS — K58.0 IRRITABLE BOWEL SYNDROME WITH DIARRHEA: ICD-10-CM

## 2021-07-27 DIAGNOSIS — K76.0 FATTY LIVER: Primary | ICD-10-CM

## 2021-07-27 PROCEDURE — 99214 OFFICE O/P EST MOD 30 MIN: CPT | Performed by: NURSE PRACTITIONER

## 2021-07-27 RX ORDER — DICYCLOMINE HCL 20 MG
20 TABLET ORAL EVERY 6 HOURS
Qty: 60 TABLET | Refills: 3 | Status: SHIPPED | OUTPATIENT
Start: 2021-07-27 | End: 2022-01-28

## 2021-07-27 NOTE — PATIENT INSTRUCTIONS
Diarrhea, Adult  Diarrhea is frequent loose and watery bowel movements. Diarrhea can make you feel weak and cause you to become dehydrated. Dehydration can make you tired and thirsty, cause you to have a dry mouth, and decrease how often you urinate.  Diarrhea typically lasts 2-3 days. However, it can last longer if it is a sign of something more serious. It is important to treat your diarrhea as told by your health care provider.  Follow these instructions at home:  Eating and drinking         Follow these recommendations as told by your health care provider:  · Take an oral rehydration solution (ORS). This is an over-the-counter medicine that helps return your body to its normal balance of nutrients and water. It is found at pharmacies and retail stores.  · Drink plenty of fluids, such as water, ice chips, diluted fruit juice, and low-calorie sports drinks. You can drink milk also, if desired.  · Avoid drinking fluids that contain a lot of sugar or caffeine, such as energy drinks, sports drinks, and soda.  · Eat bland, easy-to-digest foods in small amounts as you are able. These foods include bananas, applesauce, rice, lean meats, toast, and crackers.  · Avoid alcohol.  · Avoid spicy or fatty foods.    Medicines  · Take over-the-counter and prescription medicines only as told by your health care provider.  · If you were prescribed an antibiotic medicine, take it as told by your health care provider. Do not stop using the antibiotic even if you start to feel better.  General instructions    · Wash your hands often using soap and water. If soap and water are not available, use a hand . Others in the household should wash their hands as well. Hands should be washed:  ? After using the toilet or changing a diaper.  ? Before preparing, cooking, or serving food.  ? While caring for a sick person or while visiting someone in a hospital.  · Drink enough fluid to keep your urine pale yellow.  · Rest at home while  you recover.  · Watch your condition for any changes.  · Take a warm bath to relieve any burning or pain from frequent diarrhea episodes.  · Keep all follow-up visits as told by your health care provider. This is important.  Contact a health care provider if:  · You have a fever.  · Your diarrhea gets worse.  · You have new symptoms.  · You cannot keep fluids down.  · You feel light-headed or dizzy.  · You have a headache.  · You have muscle cramps.  Get help right away if:  · You have chest pain.  · You feel extremely weak or you faint.  · You have bloody or black stools or stools that look like tar.  · You have severe pain, cramping, or bloating in your abdomen.  · You have trouble breathing or you are breathing very quickly.  · Your heart is beating very quickly.  · Your skin feels cold and clammy.  · You feel confused.  · You have signs of dehydration, such as:  ? Dark urine, very little urine, or no urine.  ? Cracked lips.  ? Dry mouth.  ? Sunken eyes.  ? Sleepiness.  ? Weakness.  Summary  · Diarrhea is frequent loose and watery bowel movements. Diarrhea can make you feel weak and cause you to become dehydrated.  · Drink enough fluids to keep your urine pale yellow.  · Make sure that you wash your hands after using the toilet. If soap and water are not available, use hand .  · Contact a health care provider if your diarrhea gets worse or you have new symptoms.  · Get help right away if you have signs of dehydration.  This information is not intended to replace advice given to you by your health care provider. Make sure you discuss any questions you have with your health care provider.  Document Revised: 05/05/2020 Document Reviewed: 05/24/2019  [employer] Patient Education © 2021 [employer]

## 2021-08-01 PROBLEM — D63.8 ANEMIA OF CHRONIC DISEASE: Status: ACTIVE | Noted: 2021-08-01

## 2021-08-15 PROCEDURE — 87186 SC STD MICRODIL/AGAR DIL: CPT | Performed by: PHYSICIAN ASSISTANT

## 2021-08-15 PROCEDURE — 87077 CULTURE AEROBIC IDENTIFY: CPT | Performed by: PHYSICIAN ASSISTANT

## 2021-08-15 PROCEDURE — 87086 URINE CULTURE/COLONY COUNT: CPT | Performed by: PHYSICIAN ASSISTANT

## 2021-08-16 ENCOUNTER — TRANSCRIBE ORDERS (OUTPATIENT)
Dept: ADMINISTRATIVE | Facility: HOSPITAL | Age: 61
End: 2021-08-16

## 2021-08-16 ENCOUNTER — HOSPITAL ENCOUNTER (OUTPATIENT)
Dept: GENERAL RADIOLOGY | Facility: HOSPITAL | Age: 61
Discharge: HOME | End: 2021-08-16

## 2021-08-16 ENCOUNTER — LAB (OUTPATIENT)
Dept: LAB | Facility: HOSPITAL | Age: 61
End: 2021-08-16

## 2021-08-16 ENCOUNTER — HOSPITAL ENCOUNTER (OUTPATIENT)
Dept: CARDIOLOGY | Facility: HOSPITAL | Age: 61
Discharge: HOME | End: 2021-08-16

## 2021-08-16 DIAGNOSIS — I65.29 OCCLUSION OF CAROTID ARTERY, UNSPECIFIED LATERALITY: Primary | ICD-10-CM

## 2021-08-16 DIAGNOSIS — I65.29 OCCLUSION OF CAROTID ARTERY, UNSPECIFIED LATERALITY: ICD-10-CM

## 2021-08-16 DIAGNOSIS — D69.6 THROMBOCYTOPENIA (HCC): ICD-10-CM

## 2021-08-16 LAB
ALBUMIN SERPL-MCNC: 3.9 G/DL (ref 3.5–5.2)
ALBUMIN/GLOB SERPL: 1.5 G/DL
ALP SERPL-CCNC: 62 U/L (ref 39–117)
ALT SERPL W P-5'-P-CCNC: 14 U/L (ref 1–33)
ANION GAP SERPL CALCULATED.3IONS-SCNC: 9.5 MMOL/L (ref 5–15)
APTT PPP: 22.4 SECONDS (ref 22.2–34.2)
AST SERPL-CCNC: 14 U/L (ref 1–32)
BACTERIA UR QL AUTO: ABNORMAL /HPF
BASOPHILS # BLD AUTO: 0.02 10*3/MM3 (ref 0–0.2)
BASOPHILS NFR BLD AUTO: 0.5 % (ref 0–1.5)
BILIRUB SERPL-MCNC: 0.3 MG/DL (ref 0–1.2)
BILIRUB UR QL STRIP: NEGATIVE
BUN SERPL-MCNC: 25 MG/DL (ref 6–20)
BUN/CREAT SERPL: 18.5 (ref 7–25)
CALCIUM SPEC-SCNC: 8.9 MG/DL (ref 8.6–10.5)
CHLORIDE SERPL-SCNC: 101 MMOL/L (ref 98–107)
CLARITY UR: CLEAR
CO2 SERPL-SCNC: 28.5 MMOL/L (ref 22–29)
COLOR UR: ABNORMAL
CREAT SERPL-MCNC: 1.35 MG/DL (ref 0.57–1)
DEPRECATED RDW RBC AUTO: 48.9 FL (ref 37–54)
EOSINOPHIL # BLD AUTO: 0.11 10*3/MM3 (ref 0–0.4)
EOSINOPHIL NFR BLD AUTO: 2.8 % (ref 0.3–6.2)
ERYTHROCYTE [DISTWIDTH] IN BLOOD BY AUTOMATED COUNT: 14.2 % (ref 12.3–15.4)
GFR SERPL CREATININE-BSD FRML MDRD: 40 ML/MIN/1.73
GLOBULIN UR ELPH-MCNC: 2.6 GM/DL
GLUCOSE SERPL-MCNC: 193 MG/DL (ref 65–99)
GLUCOSE UR STRIP-MCNC: NEGATIVE MG/DL
HCT VFR BLD AUTO: 32.4 % (ref 34–46.6)
HGB BLD-MCNC: 10.6 G/DL (ref 12–15.9)
HGB UR QL STRIP.AUTO: NEGATIVE
HYALINE CASTS UR QL AUTO: ABNORMAL /LPF
IMM GRANULOCYTES # BLD AUTO: 0 10*3/MM3 (ref 0–0.05)
IMM GRANULOCYTES NFR BLD AUTO: 0 % (ref 0–0.5)
INR PPP: 0.95 (ref 2–3)
KETONES UR QL STRIP: NEGATIVE
LEUKOCYTE ESTERASE UR QL STRIP.AUTO: ABNORMAL
LYMPHOCYTES # BLD AUTO: 0.78 10*3/MM3 (ref 0.7–3.1)
LYMPHOCYTES NFR BLD AUTO: 20.1 % (ref 19.6–45.3)
MCH RBC QN AUTO: 30.6 PG (ref 26.6–33)
MCHC RBC AUTO-ENTMCNC: 32.7 G/DL (ref 31.5–35.7)
MCV RBC AUTO: 93.6 FL (ref 79–97)
MONOCYTES # BLD AUTO: 0.24 10*3/MM3 (ref 0.1–0.9)
MONOCYTES NFR BLD AUTO: 6.2 % (ref 5–12)
NEUTROPHILS NFR BLD AUTO: 2.73 10*3/MM3 (ref 1.7–7)
NEUTROPHILS NFR BLD AUTO: 70.4 % (ref 42.7–76)
NITRITE UR QL STRIP: POSITIVE
NRBC BLD AUTO-RTO: 0 /100 WBC (ref 0–0.2)
PH UR STRIP.AUTO: 6.5 [PH] (ref 5–8)
PLATELET # BLD AUTO: 90 10*3/MM3 (ref 140–450)
PMV BLD AUTO: 11.3 FL (ref 6–12)
POTASSIUM SERPL-SCNC: 4 MMOL/L (ref 3.5–5.2)
PROT SERPL-MCNC: 6.5 G/DL (ref 6–8.5)
PROT UR QL STRIP: ABNORMAL
PROTHROMBIN TIME: 10.5 SECONDS (ref 9.4–12)
QT INTERVAL: 432 MS
RBC # BLD AUTO: 3.46 10*6/MM3 (ref 3.77–5.28)
RBC # UR: ABNORMAL /HPF
REF LAB TEST METHOD: ABNORMAL
SODIUM SERPL-SCNC: 139 MMOL/L (ref 136–145)
SP GR UR STRIP: 1.02 (ref 1–1.03)
SQUAMOUS #/AREA URNS HPF: ABNORMAL /HPF
UROBILINOGEN UR QL STRIP: ABNORMAL
WBC # BLD AUTO: 3.88 10*3/MM3 (ref 3.4–10.8)
WBC UR QL AUTO: ABNORMAL /HPF

## 2021-08-16 PROCEDURE — 85610 PROTHROMBIN TIME: CPT

## 2021-08-16 PROCEDURE — 93005 ELECTROCARDIOGRAM TRACING: CPT | Performed by: SURGERY

## 2021-08-16 PROCEDURE — 80053 COMPREHEN METABOLIC PANEL: CPT

## 2021-08-16 PROCEDURE — 93010 ELECTROCARDIOGRAM REPORT: CPT | Performed by: INTERNAL MEDICINE

## 2021-08-16 PROCEDURE — 85025 COMPLETE CBC W/AUTO DIFF WBC: CPT

## 2021-08-16 PROCEDURE — 36415 COLL VENOUS BLD VENIPUNCTURE: CPT

## 2021-08-16 PROCEDURE — 85730 THROMBOPLASTIN TIME PARTIAL: CPT

## 2021-08-16 PROCEDURE — 71046 X-RAY EXAM CHEST 2 VIEWS: CPT

## 2021-08-16 PROCEDURE — 81001 URINALYSIS AUTO W/SCOPE: CPT

## 2021-08-20 ENCOUNTER — LAB (OUTPATIENT)
Dept: LAB | Facility: HOSPITAL | Age: 61
End: 2021-08-20

## 2021-08-20 DIAGNOSIS — Z01.818 PRE-OP TESTING: Primary | ICD-10-CM

## 2021-08-20 PROCEDURE — C9803 HOPD COVID-19 SPEC COLLECT: HCPCS

## 2021-08-20 PROCEDURE — U0003 INFECTIOUS AGENT DETECTION BY NUCLEIC ACID (DNA OR RNA); SEVERE ACUTE RESPIRATORY SYNDROME CORONAVIRUS 2 (SARS-COV-2) (CORONAVIRUS DISEASE [COVID-19]), AMPLIFIED PROBE TECHNIQUE, MAKING USE OF HIGH THROUGHPUT TECHNOLOGIES AS DESCRIBED BY CMS-2020-01-R: HCPCS

## 2021-08-22 LAB — SARS-COV-2 RNA RESP QL NAA+PROBE: NOT DETECTED

## 2021-08-26 ENCOUNTER — APPOINTMENT (OUTPATIENT)
Dept: CT IMAGING | Facility: HOSPITAL | Age: 61
End: 2021-08-26

## 2021-08-26 ENCOUNTER — HOSPITAL ENCOUNTER (EMERGENCY)
Facility: HOSPITAL | Age: 61
Discharge: HOME | End: 2021-08-26
Attending: EMERGENCY MEDICINE | Admitting: EMERGENCY MEDICINE

## 2021-08-26 VITALS
RESPIRATION RATE: 21 BRPM | WEIGHT: 199 LBS | HEART RATE: 67 BPM | TEMPERATURE: 98.4 F | DIASTOLIC BLOOD PRESSURE: 77 MMHG | BODY MASS INDEX: 35.26 KG/M2 | SYSTOLIC BLOOD PRESSURE: 186 MMHG | OXYGEN SATURATION: 95 % | HEIGHT: 63 IN

## 2021-08-26 DIAGNOSIS — I16.0 HYPERTENSIVE URGENCY: Primary | ICD-10-CM

## 2021-08-26 DIAGNOSIS — R51.9 NONINTRACTABLE HEADACHE, UNSPECIFIED CHRONICITY PATTERN, UNSPECIFIED HEADACHE TYPE: ICD-10-CM

## 2021-08-26 LAB
ALBUMIN SERPL-MCNC: 4.6 G/DL (ref 3.5–5.2)
ALBUMIN/GLOB SERPL: 1.4 G/DL
ALP SERPL-CCNC: 92 U/L (ref 39–117)
ALT SERPL W P-5'-P-CCNC: 25 U/L (ref 1–33)
ANION GAP SERPL CALCULATED.3IONS-SCNC: 13.9 MMOL/L (ref 5–15)
AST SERPL-CCNC: 26 U/L (ref 1–32)
BACTERIA UR QL AUTO: ABNORMAL /HPF
BASOPHILS # BLD AUTO: 0.01 10*3/MM3 (ref 0–0.2)
BASOPHILS NFR BLD AUTO: 0.2 % (ref 0–1.5)
BILIRUB SERPL-MCNC: 0.7 MG/DL (ref 0–1.2)
BILIRUB UR QL STRIP: NEGATIVE
BUN SERPL-MCNC: 15 MG/DL (ref 6–20)
BUN/CREAT SERPL: 17.2 (ref 7–25)
CALCIUM SPEC-SCNC: 9.9 MG/DL (ref 8.6–10.5)
CHLORIDE SERPL-SCNC: 98 MMOL/L (ref 98–107)
CLARITY UR: CLEAR
CO2 SERPL-SCNC: 26.1 MMOL/L (ref 22–29)
COLOR UR: YELLOW
CREAT SERPL-MCNC: 0.87 MG/DL (ref 0.57–1)
DEPRECATED RDW RBC AUTO: 48.8 FL (ref 37–54)
EOSINOPHIL # BLD AUTO: 0.06 10*3/MM3 (ref 0–0.4)
EOSINOPHIL NFR BLD AUTO: 1.2 % (ref 0.3–6.2)
ERYTHROCYTE [DISTWIDTH] IN BLOOD BY AUTOMATED COUNT: 14.5 % (ref 12.3–15.4)
GFR SERPL CREATININE-BSD FRML MDRD: 67 ML/MIN/1.73
GLOBULIN UR ELPH-MCNC: 3.2 GM/DL
GLUCOSE SERPL-MCNC: 224 MG/DL (ref 65–99)
GLUCOSE UR STRIP-MCNC: NEGATIVE MG/DL
HCT VFR BLD AUTO: 36.7 % (ref 34–46.6)
HGB BLD-MCNC: 12.3 G/DL (ref 12–15.9)
HGB UR QL STRIP.AUTO: NEGATIVE
HYALINE CASTS UR QL AUTO: ABNORMAL /LPF
IMM GRANULOCYTES # BLD AUTO: 0.03 10*3/MM3 (ref 0–0.05)
IMM GRANULOCYTES NFR BLD AUTO: 0.6 % (ref 0–0.5)
KETONES UR QL STRIP: NEGATIVE
LEUKOCYTE ESTERASE UR QL STRIP.AUTO: NEGATIVE
LYMPHOCYTES # BLD AUTO: 0.65 10*3/MM3 (ref 0.7–3.1)
LYMPHOCYTES NFR BLD AUTO: 12.6 % (ref 19.6–45.3)
MCH RBC QN AUTO: 31 PG (ref 26.6–33)
MCHC RBC AUTO-ENTMCNC: 33.5 G/DL (ref 31.5–35.7)
MCV RBC AUTO: 92.4 FL (ref 79–97)
MONOCYTES # BLD AUTO: 0.27 10*3/MM3 (ref 0.1–0.9)
MONOCYTES NFR BLD AUTO: 5.2 % (ref 5–12)
NEUTROPHILS NFR BLD AUTO: 4.15 10*3/MM3 (ref 1.7–7)
NEUTROPHILS NFR BLD AUTO: 80.2 % (ref 42.7–76)
NITRITE UR QL STRIP: NEGATIVE
NRBC BLD AUTO-RTO: 0 /100 WBC (ref 0–0.2)
PH UR STRIP.AUTO: 7.5 [PH] (ref 5–8)
PLATELET # BLD AUTO: 118 10*3/MM3 (ref 140–450)
PMV BLD AUTO: 10 FL (ref 6–12)
POTASSIUM SERPL-SCNC: 3.9 MMOL/L (ref 3.5–5.2)
PROT SERPL-MCNC: 7.8 G/DL (ref 6–8.5)
PROT UR QL STRIP: ABNORMAL
RBC # BLD AUTO: 3.97 10*6/MM3 (ref 3.77–5.28)
RBC # UR: ABNORMAL /HPF
REF LAB TEST METHOD: ABNORMAL
SODIUM SERPL-SCNC: 138 MMOL/L (ref 136–145)
SP GR UR STRIP: 1.01 (ref 1–1.03)
SQUAMOUS #/AREA URNS HPF: ABNORMAL /HPF
UROBILINOGEN UR QL STRIP: ABNORMAL
WBC # BLD AUTO: 5.17 10*3/MM3 (ref 3.4–10.8)
WBC UR QL AUTO: ABNORMAL /HPF

## 2021-08-26 PROCEDURE — 81001 URINALYSIS AUTO W/SCOPE: CPT | Performed by: EMERGENCY MEDICINE

## 2021-08-26 PROCEDURE — 80053 COMPREHEN METABOLIC PANEL: CPT | Performed by: EMERGENCY MEDICINE

## 2021-08-26 PROCEDURE — 85025 COMPLETE CBC W/AUTO DIFF WBC: CPT | Performed by: EMERGENCY MEDICINE

## 2021-08-26 PROCEDURE — 96374 THER/PROPH/DIAG INJ IV PUSH: CPT

## 2021-08-26 PROCEDURE — 70450 CT HEAD/BRAIN W/O DYE: CPT

## 2021-08-26 PROCEDURE — 96376 TX/PRO/DX INJ SAME DRUG ADON: CPT

## 2021-08-26 PROCEDURE — 25010000002 ONDANSETRON PER 1 MG: Performed by: EMERGENCY MEDICINE

## 2021-08-26 PROCEDURE — 99283 EMERGENCY DEPT VISIT LOW MDM: CPT

## 2021-08-26 PROCEDURE — 96375 TX/PRO/DX INJ NEW DRUG ADDON: CPT

## 2021-08-26 RX ORDER — LABETALOL HYDROCHLORIDE 5 MG/ML
20 INJECTION, SOLUTION INTRAVENOUS ONCE
Status: COMPLETED | OUTPATIENT
Start: 2021-08-26 | End: 2021-08-26

## 2021-08-26 RX ORDER — AMLODIPINE BESYLATE 5 MG/1
5 TABLET ORAL
Status: DISCONTINUED | OUTPATIENT
Start: 2021-08-26 | End: 2021-08-26 | Stop reason: HOSPADM

## 2021-08-26 RX ORDER — ONDANSETRON 2 MG/ML
4 INJECTION INTRAMUSCULAR; INTRAVENOUS ONCE
Status: COMPLETED | OUTPATIENT
Start: 2021-08-26 | End: 2021-08-26

## 2021-08-26 RX ORDER — LABETALOL HYDROCHLORIDE 5 MG/ML
10 INJECTION, SOLUTION INTRAVENOUS ONCE
Status: COMPLETED | OUTPATIENT
Start: 2021-08-26 | End: 2021-08-26

## 2021-08-26 RX ORDER — METOPROLOL TARTRATE 50 MG/1
100 TABLET, FILM COATED ORAL ONCE
Status: COMPLETED | OUTPATIENT
Start: 2021-08-26 | End: 2021-08-26

## 2021-08-26 RX ADMIN — METOPROLOL TARTRATE 100 MG: 50 TABLET, FILM COATED ORAL at 15:45

## 2021-08-26 RX ADMIN — LABETALOL 20 MG/4 ML (5 MG/ML) INTRAVENOUS SYRINGE 10 MG: at 15:21

## 2021-08-26 RX ADMIN — AMLODIPINE BESYLATE 5 MG: 5 TABLET ORAL at 17:57

## 2021-08-26 RX ADMIN — ONDANSETRON 4 MG: 2 INJECTION INTRAMUSCULAR; INTRAVENOUS at 15:19

## 2021-08-26 RX ADMIN — LABETALOL 20 MG/4 ML (5 MG/ML) INTRAVENOUS SYRINGE 20 MG: at 15:46

## 2021-08-26 NOTE — ED PROVIDER NOTES
Subjective   Patient presents with 2 days of headache and elevated blood pressure and vomiting.  She is approximately 2 days status post left carotid endarterectomy.  She denies a headache as mild and without exacerbating alleviating factors but denies of blood pressure severely elevated.  Symptoms are gradual in onset.  She denies fevers denies diarrhea she denies abdominal pain.          Review of Systems   Constitutional: Negative for chills and fever.   HENT: Negative for congestion, ear pain and sore throat.    Eyes: Negative for pain.   Respiratory: Negative for cough, chest tightness and shortness of breath.    Cardiovascular: Negative for chest pain.   Gastrointestinal: Positive for vomiting. Negative for abdominal pain, diarrhea and nausea.   Genitourinary: Negative for flank pain and hematuria.   Musculoskeletal: Negative for joint swelling.   Skin: Negative for pallor.   Neurological: Positive for headaches. Negative for seizures.   All other systems reviewed and are negative.      Past Medical History:   Diagnosis Date   • Breast cancer (CMS/HCC)     LEFT BREAST   • Carotid artery calcification     pt is going to have endarectomy 22   • Diabetes mellitus, type II (CMS/HCC)    • High cholesterol        No Known Allergies    Past Surgical History:   Procedure Laterality Date   • BREAST BIOPSY     • BREAST LUMPECTOMY     • SPINE SURGERY     • TUBAL ABDOMINAL LIGATION         Family History   Problem Relation Age of Onset   • Lung cancer Father        Social History     Socioeconomic History   • Marital status:      Spouse name: Not on file   • Number of children: Not on file   • Years of education: Not on file   • Highest education level: Not on file   Tobacco Use   • Smoking status: Former Smoker     Packs/day: 1.00     Years: 30.00     Pack years: 30.00     Quit date:      Years since quittin.6   • Smokeless tobacco: Never Used   Vaping Use   • Vaping Use: Never used   Substance and  Sexual Activity   • Alcohol use: Never   • Drug use: Never   • Sexual activity: Defer           Objective   Physical Exam  Constitutional:       Appearance: Normal appearance.   HENT:      Head: Normocephalic and atraumatic.      Nose: Nose normal.      Mouth/Throat:      Mouth: Mucous membranes are moist.   Eyes:      Extraocular Movements: Extraocular movements intact.      Conjunctiva/sclera: Conjunctivae normal.      Pupils: Pupils are equal, round, and reactive to light.   Cardiovascular:      Rate and Rhythm: Normal rate and regular rhythm.      Pulses: Normal pulses.      Heart sounds: Normal heart sounds.   Pulmonary:      Effort: Pulmonary effort is normal.      Breath sounds: Normal breath sounds. No wheezing.   Abdominal:      Palpations: Abdomen is soft.      Tenderness: There is no abdominal tenderness.   Musculoskeletal:         General: Normal range of motion.      Cervical back: Normal range of motion and neck supple.      Right lower leg: No edema.      Left lower leg: No edema.   Skin:     General: Skin is warm and dry.      Capillary Refill: Capillary refill takes less than 2 seconds.      Findings: No rash.   Neurological:      General: No focal deficit present.      Mental Status: She is alert and oriented to person, place, and time. Mental status is at baseline.      Cranial Nerves: No cranial nerve deficit.      Sensory: No sensory deficit.      Motor: No weakness.   Psychiatric:         Mood and Affect: Mood normal.         Behavior: Behavior normal.         Procedures           ED Course                                           MDM  Number of Diagnoses or Management Options  Hypertensive urgency  Diagnosis management comments: Patient presents with elevated blood pressure and headache.  Headache differential considerations include but are not limited to intracranial hemorrhage versus hypertensive crisis and encephalopathy versus brain mass.  CT scan of the brain is read by radiology and  reviewed by me does not demonstrate acute intracranial findings.  ED course she received antihypertensives  and had symptomatic improvement in her headache.  White count was unremarkable chemistries remarkable glucose 224 urine positive for proteinuria negative for bacteria.  She is discharged stable advised to double her amlodipine dose and have her blood pressure rechecked tomorrow by her PCP.       Amount and/or Complexity of Data Reviewed  Clinical lab tests: reviewed  Tests in the radiology section of CPT®: reviewed    Risk of Complications, Morbidity, and/or Mortality  Presenting problems: high  Management options: high    Critical Care  Total time providing critical care: 30-74 minutes      Final diagnoses:   Hypertensive urgency   Nonintractable headache, unspecified chronicity pattern, unspecified headache type       ED Disposition  ED Disposition     ED Disposition Condition Comment    Discharge Stable           Ольга Bhagat, APRN  [address] [facility]  [address]  [facility] [address]  [phone]          Ольга Bhagat, APRN  [address] [facility]  [address]  [facility] [address]  [phone]    Schedule an appointment as soon as possible for a visit            Medication List      No changes were made to your prescriptions during this visit.          Pilo Welsh MD  08/26/21 4556

## 2021-08-26 NOTE — DISCHARGE INSTRUCTIONS
Have your blood pressure rechecked tomorrow by your primary care provider.  Double your amlodipine dosage to 10 mg daily.

## 2021-08-27 ENCOUNTER — TRANSCRIBE ORDERS (OUTPATIENT)
Dept: ADMINISTRATIVE | Facility: HOSPITAL | Age: 61
End: 2021-08-27

## 2021-08-27 DIAGNOSIS — Z86.79 HISTORY OF CAROTID STENOSIS: ICD-10-CM

## 2021-08-27 DIAGNOSIS — Z48.812 POSTOP CAROTID ENDARTERECTOMY SURVEILLANCE, ENCOUNTER FOR: Primary | ICD-10-CM

## 2021-09-13 ENCOUNTER — HOSPITAL ENCOUNTER (OUTPATIENT)
Dept: CARDIOLOGY | Facility: HOSPITAL | Age: 61
Discharge: HOME | End: 2021-09-13
Admitting: SURGERY

## 2021-09-13 DIAGNOSIS — Z48.812 POSTOP CAROTID ENDARTERECTOMY SURVEILLANCE, ENCOUNTER FOR: ICD-10-CM

## 2021-09-13 DIAGNOSIS — Z86.79 HISTORY OF CAROTID STENOSIS: ICD-10-CM

## 2021-09-13 LAB
BH CV XLRA MEAS LEFT CAROTID BULB EDV: 27 CM/SEC
BH CV XLRA MEAS LEFT CAROTID BULB PSV: 93 CM/SEC
BH CV XLRA MEAS LEFT DIST CCA EDV: 20 CM/SEC
BH CV XLRA MEAS LEFT DIST CCA PSV: 81 CM/SEC
BH CV XLRA MEAS LEFT DIST ICA EDV: 25 CM/SEC
BH CV XLRA MEAS LEFT DIST ICA PSV: 69 CM/SEC
BH CV XLRA MEAS LEFT MID ICA EDV: 42 CM/SEC
BH CV XLRA MEAS LEFT MID ICA PSV: 132 CM/SEC
BH CV XLRA MEAS LEFT PROX CCA EDV: 27 CM/SEC
BH CV XLRA MEAS LEFT PROX CCA PSV: 92 CM/SEC
BH CV XLRA MEAS LEFT PROX ECA EDV: 24 CM/SEC
BH CV XLRA MEAS LEFT PROX ECA PSV: 212 CM/SEC
BH CV XLRA MEAS LEFT PROX ICA EDV: 31 CM/SEC
BH CV XLRA MEAS LEFT PROX ICA PSV: 128 CM/SEC
BH CV XLRA MEAS LEFT VERTEBRAL A EDV: 12 CM/SEC
BH CV XLRA MEAS LEFT VERTEBRAL A PSV: 44 CM/SEC
BH CV XLRA MEAS RIGHT CAROTID BULB EDV: 34 CM/SEC
BH CV XLRA MEAS RIGHT CAROTID BULB PSV: 155 CM/SEC
BH CV XLRA MEAS RIGHT DIST CCA EDV: 24 CM/SEC
BH CV XLRA MEAS RIGHT DIST CCA PSV: 90 CM/SEC
BH CV XLRA MEAS RIGHT DIST ICA EDV: 25 CM/SEC
BH CV XLRA MEAS RIGHT DIST ICA PSV: 114 CM/SEC
BH CV XLRA MEAS RIGHT MID ICA EDV: 24 CM/SEC
BH CV XLRA MEAS RIGHT MID ICA PSV: 92 CM/SEC
BH CV XLRA MEAS RIGHT PROX CCA EDV: 25 CM/SEC
BH CV XLRA MEAS RIGHT PROX CCA PSV: 93 CM/SEC
BH CV XLRA MEAS RIGHT PROX ECA EDV: 24 CM/SEC
BH CV XLRA MEAS RIGHT PROX ECA PSV: 182 CM/SEC
BH CV XLRA MEAS RIGHT PROX ICA EDV: 40 CM/SEC
BH CV XLRA MEAS RIGHT PROX ICA PSV: 175 CM/SEC
BH CV XLRA MEAS RIGHT VERTEBRAL A EDV: 11 CM/SEC
BH CV XLRA MEAS RIGHT VERTEBRAL A PSV: 46 CM/SEC
LEFT ARM BP: NORMAL MMHG
MAXIMAL PREDICTED HEART RATE: 161 BPM
RIGHT ARM BP: NORMAL MMHG
STRESS TARGET HR: 137 BPM

## 2021-09-13 PROCEDURE — 93880 EXTRACRANIAL BILAT STUDY: CPT | Performed by: SURGERY

## 2021-09-13 PROCEDURE — 93880 EXTRACRANIAL BILAT STUDY: CPT

## 2021-10-11 DIAGNOSIS — C50.912 MALIGNANT NEOPLASM OF LEFT FEMALE BREAST, UNSPECIFIED ESTROGEN RECEPTOR STATUS, UNSPECIFIED SITE OF BREAST (HCC): Primary | ICD-10-CM

## 2021-10-12 ENCOUNTER — OFFICE VISIT (OUTPATIENT)
Dept: ONCOLOGY | Facility: HOSPITAL | Age: 61
End: 2021-10-12

## 2021-10-12 ENCOUNTER — LAB (OUTPATIENT)
Dept: ONCOLOGY | Facility: HOSPITAL | Age: 61
End: 2021-10-12

## 2021-10-12 VITALS
OXYGEN SATURATION: 96 % | DIASTOLIC BLOOD PRESSURE: 53 MMHG | WEIGHT: 196.65 LBS | SYSTOLIC BLOOD PRESSURE: 152 MMHG | RESPIRATION RATE: 18 BRPM | TEMPERATURE: 98.2 F | BODY MASS INDEX: 34.84 KG/M2 | HEART RATE: 72 BPM

## 2021-10-12 DIAGNOSIS — R39.9 UTI SYMPTOMS: Primary | ICD-10-CM

## 2021-10-12 DIAGNOSIS — C50.912 MALIGNANT NEOPLASM OF LEFT FEMALE BREAST, UNSPECIFIED ESTROGEN RECEPTOR STATUS, UNSPECIFIED SITE OF BREAST (HCC): ICD-10-CM

## 2021-10-12 DIAGNOSIS — R39.9 UTI SYMPTOMS: ICD-10-CM

## 2021-10-12 DIAGNOSIS — K76.6 PORTAL HYPERTENSION (HCC): ICD-10-CM

## 2021-10-12 DIAGNOSIS — D64.9 NORMOCYTIC ANEMIA: ICD-10-CM

## 2021-10-12 DIAGNOSIS — D69.6 THROMBOCYTOPENIA (HCC): ICD-10-CM

## 2021-10-12 DIAGNOSIS — D05.12 DUCTAL CARCINOMA IN SITU (DCIS) OF LEFT BREAST: ICD-10-CM

## 2021-10-12 PROBLEM — Z79.899 HIGH RISK MEDICATION USE: Status: ACTIVE | Noted: 2021-09-15

## 2021-10-12 LAB
ALBUMIN SERPL-MCNC: 4.04 G/DL (ref 3.5–5.2)
ALBUMIN/GLOB SERPL: 1.5 G/DL
ALP SERPL-CCNC: 77 U/L (ref 39–117)
ALT SERPL W P-5'-P-CCNC: 12 U/L (ref 1–33)
ANION GAP SERPL CALCULATED.3IONS-SCNC: 11.1 MMOL/L (ref 5–15)
ANISOCYTOSIS BLD QL: NORMAL
AST SERPL-CCNC: 15 U/L (ref 1–32)
BACTERIA UR QL AUTO: ABNORMAL /HPF
BASOPHILS # BLD AUTO: 0.04 10*3/MM3 (ref 0–0.2)
BASOPHILS NFR BLD AUTO: 1 % (ref 0–1.5)
BILIRUB SERPL-MCNC: 0.4 MG/DL (ref 0–1.2)
BILIRUB UR QL STRIP: ABNORMAL
BUN SERPL-MCNC: 15 MG/DL (ref 6–20)
BUN/CREAT SERPL: 12.6 (ref 7–25)
CALCIUM SPEC-SCNC: 9.7 MG/DL (ref 8.6–10.5)
CHLORIDE SERPL-SCNC: 100 MMOL/L (ref 98–107)
CLARITY UR: CLEAR
CO2 SERPL-SCNC: 27.9 MMOL/L (ref 22–29)
COLOR UR: ABNORMAL
CREAT SERPL-MCNC: 1.19 MG/DL (ref 0.57–1)
DACRYOCYTES BLD QL SMEAR: NORMAL
DEPRECATED RDW RBC AUTO: 56.7 FL (ref 37–54)
EOSINOPHIL # BLD AUTO: 0.19 10*3/MM3 (ref 0–0.4)
EOSINOPHIL NFR BLD AUTO: 4.8 % (ref 0.3–6.2)
ERYTHROCYTE [DISTWIDTH] IN BLOOD BY AUTOMATED COUNT: 16 % (ref 12.3–15.4)
GFR SERPL CREATININE-BSD FRML MDRD: 46 ML/MIN/1.73
GLOBULIN UR ELPH-MCNC: 2.8 GM/DL
GLUCOSE SERPL-MCNC: 238 MG/DL (ref 65–99)
GLUCOSE UR STRIP-MCNC: ABNORMAL MG/DL
HCT VFR BLD AUTO: 34.6 % (ref 34–46.6)
HGB BLD-MCNC: 11 G/DL (ref 12–15.9)
HGB UR QL STRIP.AUTO: ABNORMAL
HYALINE CASTS UR QL AUTO: ABNORMAL /LPF
IMM GRANULOCYTES # BLD AUTO: 0 10*3/MM3 (ref 0–0.05)
IMM GRANULOCYTES NFR BLD AUTO: 0 % (ref 0–0.5)
KETONES UR QL STRIP: ABNORMAL
LEUKOCYTE ESTERASE UR QL STRIP.AUTO: ABNORMAL
LYMPHOCYTES # BLD AUTO: 0.94 10*3/MM3 (ref 0.7–3.1)
LYMPHOCYTES NFR BLD AUTO: 23.5 % (ref 19.6–45.3)
MCH RBC QN AUTO: 30.7 PG (ref 26.6–33)
MCHC RBC AUTO-ENTMCNC: 31.8 G/DL (ref 31.5–35.7)
MCV RBC AUTO: 96.6 FL (ref 79–97)
MONOCYTES # BLD AUTO: 0.4 10*3/MM3 (ref 0.1–0.9)
MONOCYTES NFR BLD AUTO: 10 % (ref 5–12)
NEUTROPHILS NFR BLD AUTO: 2.43 10*3/MM3 (ref 1.7–7)
NEUTROPHILS NFR BLD AUTO: 60.7 % (ref 42.7–76)
NITRITE UR QL STRIP: ABNORMAL
OVALOCYTES BLD QL SMEAR: NORMAL
PH UR STRIP.AUTO: ABNORMAL [PH]
PLATELET # BLD AUTO: 116 10*3/MM3 (ref 140–450)
PMV BLD AUTO: 10.4 FL (ref 6–12)
POIKILOCYTOSIS BLD QL SMEAR: NORMAL
POTASSIUM SERPL-SCNC: 4.3 MMOL/L (ref 3.5–5.2)
PROT SERPL-MCNC: 6.8 G/DL (ref 6–8.5)
PROT UR QL STRIP: ABNORMAL
RBC # BLD AUTO: 3.58 10*6/MM3 (ref 3.77–5.28)
RBC # UR: ABNORMAL /HPF
REF LAB TEST METHOD: ABNORMAL
SMALL PLATELETS BLD QL SMEAR: NORMAL
SODIUM SERPL-SCNC: 139 MMOL/L (ref 136–145)
SP GR UR STRIP: 1.02 (ref 1–1.03)
SQUAMOUS #/AREA URNS HPF: ABNORMAL /HPF
UROBILINOGEN UR QL STRIP: ABNORMAL
WBC # BLD AUTO: 4 10*3/MM3 (ref 3.4–10.8)
WBC MORPH BLD: NORMAL
WBC UR QL AUTO: ABNORMAL /HPF

## 2021-10-12 PROCEDURE — G0463 HOSPITAL OUTPT CLINIC VISIT: HCPCS

## 2021-10-12 PROCEDURE — 80053 COMPREHEN METABOLIC PANEL: CPT

## 2021-10-12 PROCEDURE — 85007 BL SMEAR W/DIFF WBC COUNT: CPT

## 2021-10-12 PROCEDURE — 36415 COLL VENOUS BLD VENIPUNCTURE: CPT

## 2021-10-12 PROCEDURE — 81001 URINALYSIS AUTO W/SCOPE: CPT

## 2021-10-12 PROCEDURE — 87086 URINE CULTURE/COLONY COUNT: CPT

## 2021-10-12 PROCEDURE — 99215 OFFICE O/P EST HI 40 MIN: CPT | Performed by: INTERNAL MEDICINE

## 2021-10-12 PROCEDURE — 87186 SC STD MICRODIL/AGAR DIL: CPT

## 2021-10-12 PROCEDURE — 87077 CULTURE AEROBIC IDENTIFY: CPT

## 2021-10-12 PROCEDURE — 85025 COMPLETE CBC W/AUTO DIFF WBC: CPT

## 2021-10-12 RX ORDER — LEVOFLOXACIN 750 MG/1
750 TABLET ORAL EVERY OTHER DAY
Qty: 4 TABLET | Refills: 0 | Status: SHIPPED | OUTPATIENT
Start: 2021-10-12 | End: 2022-01-27

## 2021-10-12 RX ORDER — HYDROCODONE BITARTRATE AND ACETAMINOPHEN 10; 325 MG/1; MG/1
1 TABLET ORAL 2 TIMES DAILY
COMMUNITY
Start: 2021-08-25 | End: 2022-05-02

## 2021-10-12 NOTE — PROGRESS NOTES
Patient  Airam Pool    Location  [facility] HEMATOLOGY & ONCOLOGY    Chief Complaint  Breast Cancer (-f2)    Referring Provider: ALEXIS oKlb  PCP: Ольга Bhagat APRN    Subjective          Oncology/Hematology History Overview Note   ) LEFT breast calcification: CNbx: DCIS. (1/11/17): size: 5 mm, solid, cribiform, micropapillary, low to intermediate grade, no necrosis, calcifications present: ER (+) 95%, OR (+) 90%.     Treatment:     1) Left breast, excision with  needle lumpectomy: dx: (2/3/17) negative for DCIS. Stage: pTis (DCIS).   2) Radiation consult for radiation: Radiation not recommended by Dr. Kaufman. (3/6/2017)  3) Dexa scan: osteopenia in the femoral head. (3/3017)  4) Arimidex  1mg QD started. (4/13/17)  5) Mammogram was benign. (2/2/18) (2/7/19)     Ductal carcinoma in situ (DCIS) of left breast   1/1/2017 Initial Diagnosis    Breast cancer (CMS/HCC)         History of Present Illness  Patient comes in today for follow-up on her repeat CBC and CMP.  Patient's labs are stable.  Her hemoglobin is 11.0, WBC count normal at 4.0, and platelet count stable at 116.  To refresh my memory regarding her evaluation today, I reviewed her past images.  She had a CT of the abdomen and pelvis on 6/8/2021 which was notable for hepatic steatosis, splenomegaly, and gastroesophageal varices associated with portal hypertension.  The patient has since seen the nurse practitioner in gastroenterology but has not had an evaluation by someone regarding her liver disease.  I tried to discuss these issues with the patient but she was in significant discomfort.  She was pacing around the room due to pain in her back which she relates to a UTI.  She has been unable to get into her primary care provider's office and has not started any medication.  She is also experiencing urinary urgency and frequency although not as much dysuria.    I reviewed the results of urine culture which was done in  August of this year.  She had a UTI with E. coli that was fairly sensitive to medications.    Review of Systems   Constitutional: Positive for fatigue (8/10). Negative for appetite change, diaphoresis, fever, unexpected weight gain and unexpected weight loss.   HENT: Negative for hearing loss, sore throat and voice change.    Eyes: Negative for blurred vision, double vision, pain, redness and visual disturbance.   Respiratory: Negative for cough, shortness of breath and wheezing.    Cardiovascular: Negative for chest pain, palpitations and leg swelling.   Gastrointestinal: Positive for abdominal pain.   Endocrine: Negative for cold intolerance, heat intolerance, polydipsia and polyuria.   Genitourinary: Positive for urinary incontinence (patient has UTI ). Negative for decreased urine volume, difficulty urinating and frequency.   Musculoskeletal: Negative for arthralgias, back pain (lower back ), joint swelling and myalgias.   Skin: Negative for color change, rash, skin lesions and bruise.   Neurological: Negative for dizziness, seizures, numbness and headache.   Hematological: Negative for adenopathy. Does not bruise/bleed easily.   Psychiatric/Behavioral: Negative for depressed mood. The patient is not nervous/anxious.        Past Medical History:   Diagnosis Date   • Breast cancer (HCC)     LEFT BREAST   • Carotid artery calcification     pt is going to have endarectomy 22   • Diabetes mellitus, type II (HCC)    • High cholesterol      Past Surgical History:   Procedure Laterality Date   • BREAST BIOPSY     • BREAST LUMPECTOMY     • SPINE SURGERY     • TUBAL ABDOMINAL LIGATION       Social History     Socioeconomic History   • Marital status:    Tobacco Use   • Smoking status: Former Smoker     Packs/day: 1.00     Years: 30.00     Pack years: 30.00     Quit date:      Years since quittin.7   • Smokeless tobacco: Never Used   Vaping Use   • Vaping Use: Never used   Substance and Sexual Activity    • Alcohol use: Never   • Drug use: Never   • Sexual activity: Defer     Family History   Problem Relation Age of Onset   • Lung cancer Father        Objective   Physical Exam  General: Alert, cooperative, but appears very uncomfortable, pacing around the room  Eyes: Anicteric sclera, PERRLA  Respiratory: normal respiratory effort  Cardiovascular: no lower extremity edema  Skin: Normal tone, no rash, no lesions  Psychiatric: Appropriate affect, intact judgment  Neurologic: No focal sensory or motor deficits, normal cognition   Musculoskeletal: Normal muscle strength and tone  Extremities: No clubbing, cyanosis, or deformities    Vitals:    10/12/21 1246   BP: 152/53   Pulse: 72   Resp: 18   Temp: 98.2 °F (36.8 °C)   SpO2: 96%   Weight: 89.2 kg (196 lb 10.4 oz)   PainSc:   8     ECOG score: 1         PHQ-9 Total Score:         Result Review :   The following data was reviewed by: Asiya Boss MD PhD on 10/12/2021:  Lab Results   Component Value Date    HGB 11.0 (L) 10/12/2021    HCT 34.6 10/12/2021    MCV 96.6 10/12/2021     (L) 10/12/2021    WBC 4.00 10/12/2021    NEUTROABS 2.43 10/12/2021    LYMPHSABS 0.94 10/12/2021    MONOSABS 0.40 10/12/2021    EOSABS 0.19 10/12/2021    BASOSABS 0.04 10/12/2021     Lab Results   Component Value Date    GLUCOSE 238 (H) 10/12/2021    BUN 15 10/12/2021    CREATININE 1.19 (H) 10/12/2021     10/12/2021    K 4.3 10/12/2021     10/12/2021    CO2 27.9 10/12/2021    CALCIUM 9.7 10/12/2021    PROTEINTOT 6.8 10/12/2021    ALBUMIN 4.04 10/12/2021    BILITOT 0.4 10/12/2021    ALKPHOS 77 10/12/2021    AST 15 10/12/2021    ALT 12 10/12/2021          Assessment and Plan    Diagnoses and all orders for this visit:    1. UTI symptoms (Primary)  -     Urinalysis With Microscopic - Urine, Clean Catch; Future  -     Urine Culture - Urine, Urine, Clean Catch; Future    2. Ductal carcinoma in situ (DCIS) of left breast    3. Thrombocytopenia (HCC)  -     CBC & Differential;  Future  -     Comprehensive Metabolic Panel; Future    4. Portal hypertension (HCC)  -     Ambulatory Referral to Gastroenterology    5. Normocytic anemia    Other orders  -     levoFLOXacin (LEVAQUIN) 750 MG tablet; Take 1 tablet by mouth Every Other Day.  Dispense: 4 tablet; Refill: 0      Anemia and thrombocytopenia: This is likely secondary to underlying liver disease with splenomegaly.  The patient's labs are stable.  I will refer her back to Dr. Fischer in gastroenterology for work-up of cirrhosis. I will f/u in 3 months with repeat CBC.     UTI symptoms: I will send prescription for Levaquin.  Due to her reduced renal function I am prescribing 75 mg every other day.  She will have 4 tablets for a total of 8 days of treatment.  I have instructed her to follow-up with her primary care provider.    DCIS: I will refill her AI which she will continue for 6 more months.      I spent 40 minutes caring for Airam on this date of service. This time includes time spent by me in the following activities: preparing for the visit, reviewing tests, obtaining and/or reviewing a separately obtained history, performing a medically appropriate examination and/or evaluation, counseling and educating the patient/family/caregiver, ordering medications, tests, or procedures and documenting information in the medical record    Patient was given instructions and counseling regarding her condition or for health maintenance advice. Please see specific information pulled into the AVS if appropriate.     Asiya Boss MD PhD    10/12/2021

## 2021-10-13 ENCOUNTER — TELEPHONE (OUTPATIENT)
Dept: ONCOLOGY | Facility: HOSPITAL | Age: 61
End: 2021-10-13

## 2021-10-13 NOTE — TELEPHONE ENCOUNTER
Notified patient of new prescription for Levaquin. Instructed to take every other day and monitor her glucose level as the levaquin with her Glipizide can cause low blood glucose. Advised Dr. Boss recommended to hold Glypizide while on Levaquin if her glucose level dropped. Patient voiced understanding.

## 2021-10-14 LAB — BACTERIA SPEC AEROBE CULT: ABNORMAL

## 2021-12-10 ENCOUNTER — TRANSCRIBE ORDERS (OUTPATIENT)
Dept: ADMINISTRATIVE | Facility: HOSPITAL | Age: 61
End: 2021-12-10

## 2021-12-10 ENCOUNTER — HOSPITAL ENCOUNTER (OUTPATIENT)
Dept: CARDIOLOGY | Facility: HOSPITAL | Age: 61
Discharge: HOME | End: 2021-12-10
Admitting: NURSE PRACTITIONER

## 2021-12-10 DIAGNOSIS — M25.562 PAIN AND SWELLING OF KNEE, LEFT: Primary | ICD-10-CM

## 2021-12-10 DIAGNOSIS — M25.462 PAIN AND SWELLING OF KNEE, LEFT: Primary | ICD-10-CM

## 2021-12-10 DIAGNOSIS — M25.562 PAIN AND SWELLING OF KNEE, LEFT: ICD-10-CM

## 2021-12-10 DIAGNOSIS — M25.462 PAIN AND SWELLING OF KNEE, LEFT: ICD-10-CM

## 2021-12-10 PROCEDURE — 93971 EXTREMITY STUDY: CPT

## 2021-12-10 PROCEDURE — 93971 EXTREMITY STUDY: CPT | Performed by: SURGERY

## 2021-12-11 LAB
BH CV LOWER VASCULAR LEFT COMMON FEMORAL AUGMENT: NORMAL
BH CV LOWER VASCULAR LEFT COMMON FEMORAL COMPRESS: NORMAL
BH CV LOWER VASCULAR LEFT COMMON FEMORAL PHASIC: NORMAL
BH CV LOWER VASCULAR LEFT COMMON FEMORAL SPONT: NORMAL
BH CV LOWER VASCULAR RIGHT COMMON FEMORAL AUGMENT: NORMAL
BH CV LOWER VASCULAR RIGHT COMMON FEMORAL COMPRESS: NORMAL
BH CV LOWER VASCULAR RIGHT COMMON FEMORAL PHASIC: NORMAL
BH CV LOWER VASCULAR RIGHT COMMON FEMORAL SPONT: NORMAL
BH CV LOWER VASCULAR RIGHT DISTAL FEMORAL COMPETENT: NORMAL
BH CV LOWER VASCULAR RIGHT DISTAL FEMORAL COMPRESS: NORMAL
BH CV LOWER VASCULAR RIGHT DISTAL FEMORAL PHASIC: NORMAL
BH CV LOWER VASCULAR RIGHT DISTAL FEMORAL SPONT: NORMAL
BH CV LOWER VASCULAR RIGHT GREATER SAPH AK COMPRESS: NORMAL
BH CV LOWER VASCULAR RIGHT MID FEMORAL COMPRESS: NORMAL
BH CV LOWER VASCULAR RIGHT PERONEAL COMPRESS: NORMAL
BH CV LOWER VASCULAR RIGHT POPLITEAL AUGMENT: NORMAL
BH CV LOWER VASCULAR RIGHT POPLITEAL COMPETENT: NORMAL
BH CV LOWER VASCULAR RIGHT POPLITEAL COMPRESS: NORMAL
BH CV LOWER VASCULAR RIGHT POPLITEAL PHASIC: NORMAL
BH CV LOWER VASCULAR RIGHT POPLITEAL SPONT: NORMAL
BH CV LOWER VASCULAR RIGHT POSTERIOR TIBIAL AUGMENT: NORMAL
BH CV LOWER VASCULAR RIGHT POSTERIOR TIBIAL COMPRESS: NORMAL
BH CV LOWER VASCULAR RIGHT PROXIMAL FEMORAL COMPRESS: NORMAL
MAXIMAL PREDICTED HEART RATE: 160 BPM
STRESS TARGET HR: 136 BPM

## 2022-01-07 ENCOUNTER — TRANSCRIBE ORDERS (OUTPATIENT)
Dept: ADMINISTRATIVE | Facility: HOSPITAL | Age: 62
End: 2022-01-07

## 2022-01-07 DIAGNOSIS — G89.29 CHRONIC NECK PAIN: Primary | ICD-10-CM

## 2022-01-07 DIAGNOSIS — M54.2 CHRONIC NECK PAIN: Primary | ICD-10-CM

## 2022-01-11 ENCOUNTER — LAB (OUTPATIENT)
Dept: ONCOLOGY | Facility: HOSPITAL | Age: 62
End: 2022-01-11

## 2022-01-11 ENCOUNTER — OFFICE VISIT (OUTPATIENT)
Dept: ONCOLOGY | Facility: HOSPITAL | Age: 62
End: 2022-01-11

## 2022-01-11 VITALS
SYSTOLIC BLOOD PRESSURE: 180 MMHG | HEART RATE: 90 BPM | TEMPERATURE: 98.8 F | DIASTOLIC BLOOD PRESSURE: 55 MMHG | WEIGHT: 211.42 LBS | RESPIRATION RATE: 16 BRPM | OXYGEN SATURATION: 93 % | BODY MASS INDEX: 37.45 KG/M2

## 2022-01-11 DIAGNOSIS — Z12.31 BREAST CANCER SCREENING BY MAMMOGRAM: ICD-10-CM

## 2022-01-11 DIAGNOSIS — E11.9 TYPE 2 DIABETES MELLITUS WITHOUT COMPLICATION, UNSPECIFIED WHETHER LONG TERM INSULIN USE: ICD-10-CM

## 2022-01-11 DIAGNOSIS — D69.6 THROMBOCYTOPENIA: ICD-10-CM

## 2022-01-11 DIAGNOSIS — D05.12 DUCTAL CARCINOMA IN SITU (DCIS) OF LEFT BREAST: Primary | ICD-10-CM

## 2022-01-11 LAB
ALBUMIN SERPL-MCNC: 4.41 G/DL (ref 3.5–5.2)
ALBUMIN/GLOB SERPL: 1.8 G/DL
ALP SERPL-CCNC: 100 U/L (ref 39–117)
ALT SERPL W P-5'-P-CCNC: 29 U/L (ref 1–33)
ANION GAP SERPL CALCULATED.3IONS-SCNC: 13 MMOL/L (ref 5–15)
AST SERPL-CCNC: 23 U/L (ref 1–32)
BASOPHILS # BLD AUTO: 0.03 10*3/MM3 (ref 0–0.2)
BASOPHILS NFR BLD AUTO: 0.5 % (ref 0–1.5)
BILIRUB SERPL-MCNC: 0.5 MG/DL (ref 0–1.2)
BUN SERPL-MCNC: 18 MG/DL (ref 8–23)
BUN/CREAT SERPL: 15.8 (ref 7–25)
CALCIUM SPEC-SCNC: 9.5 MG/DL (ref 8.6–10.5)
CHLORIDE SERPL-SCNC: 100 MMOL/L (ref 98–107)
CO2 SERPL-SCNC: 26 MMOL/L (ref 22–29)
CREAT SERPL-MCNC: 1.14 MG/DL (ref 0.57–1)
DEPRECATED RDW RBC AUTO: 48.4 FL (ref 37–54)
EOSINOPHIL # BLD AUTO: 0.12 10*3/MM3 (ref 0–0.4)
EOSINOPHIL NFR BLD AUTO: 2 % (ref 0.3–6.2)
ERYTHROCYTE [DISTWIDTH] IN BLOOD BY AUTOMATED COUNT: 14.2 % (ref 12.3–15.4)
GFR SERPL CREATININE-BSD FRML MDRD: 49 ML/MIN/1.73
GLOBULIN UR ELPH-MCNC: 2.4 GM/DL
GLUCOSE SERPL-MCNC: 233 MG/DL (ref 65–99)
HBA1C MFR BLD: 9.84 % (ref 4.8–5.6)
HCT VFR BLD AUTO: 36.7 % (ref 34–46.6)
HGB BLD-MCNC: 11.8 G/DL (ref 12–15.9)
IMM GRANULOCYTES # BLD AUTO: 0.03 10*3/MM3 (ref 0–0.05)
IMM GRANULOCYTES NFR BLD AUTO: 0.5 % (ref 0–0.5)
LYMPHOCYTES # BLD AUTO: 0.67 10*3/MM3 (ref 0.7–3.1)
LYMPHOCYTES NFR BLD AUTO: 11 % (ref 19.6–45.3)
MCH RBC QN AUTO: 30.1 PG (ref 26.6–33)
MCHC RBC AUTO-ENTMCNC: 32.2 G/DL (ref 31.5–35.7)
MCV RBC AUTO: 93.6 FL (ref 79–97)
MONOCYTES # BLD AUTO: 0.37 10*3/MM3 (ref 0.1–0.9)
MONOCYTES NFR BLD AUTO: 6.1 % (ref 5–12)
NEUTROPHILS NFR BLD AUTO: 4.88 10*3/MM3 (ref 1.7–7)
NEUTROPHILS NFR BLD AUTO: 79.9 % (ref 42.7–76)
PLATELET # BLD AUTO: 82 10*3/MM3 (ref 140–450)
PMV BLD AUTO: 10 FL (ref 6–12)
POTASSIUM SERPL-SCNC: 4.1 MMOL/L (ref 3.5–5.2)
PROT SERPL-MCNC: 6.8 G/DL (ref 6–8.5)
RBC # BLD AUTO: 3.92 10*6/MM3 (ref 3.77–5.28)
SODIUM SERPL-SCNC: 139 MMOL/L (ref 136–145)
WBC NRBC COR # BLD: 6.1 10*3/MM3 (ref 3.4–10.8)

## 2022-01-11 PROCEDURE — 83036 HEMOGLOBIN GLYCOSYLATED A1C: CPT

## 2022-01-11 PROCEDURE — G0463 HOSPITAL OUTPT CLINIC VISIT: HCPCS | Performed by: INTERNAL MEDICINE

## 2022-01-11 PROCEDURE — 80053 COMPREHEN METABOLIC PANEL: CPT

## 2022-01-11 PROCEDURE — 99214 OFFICE O/P EST MOD 30 MIN: CPT | Performed by: INTERNAL MEDICINE

## 2022-01-11 PROCEDURE — 85025 COMPLETE CBC W/AUTO DIFF WBC: CPT

## 2022-01-11 PROCEDURE — 76000 FLUOROSCOPY <1 HR PHYS/QHP: CPT | Performed by: INTERNAL MEDICINE

## 2022-01-11 PROCEDURE — 36415 COLL VENOUS BLD VENIPUNCTURE: CPT

## 2022-01-11 NOTE — PROGRESS NOTES
Patient  Airam Pool    Location  [facility] HEMATOLOGY & ONCOLOGY    Chief Complaint  Breast Cancer (-fu)    Referring Provider: ALEXIS Kolb  PCP: Ольга Bhagat APRN    Subjective          Oncology/Hematology History Overview Note   ) LEFT breast calcification: CNbx: DCIS. (1/11/17): size: 5 mm, solid, cribiform, micropapillary, low to intermediate grade, no necrosis, calcifications present: ER (+) 95%, LA (+) 90%.     Treatment:     1) Left breast, excision with  needle lumpectomy: dx: (2/3/17) negative for DCIS. Stage: pTis (DCIS).   2) Radiation consult for radiation: Radiation not recommended by Dr. Kaufman. (3/6/2017)  3) Dexa scan: osteopenia in the femoral head. (3/3017)  4) Arimidex  1mg QD started. (4/13/17)  5) Mammogram was benign. (2/2/18) (2/7/19)     Ductal carcinoma in situ (DCIS) of left breast   1/1/2017 Initial Diagnosis    Breast cancer (CMS/HCC)         History of Present Illness  Patient comes in today for routine follow-up.  She has a history of DCIS as well as moderate to severe thrombocytopenia.  Reviewed the patient's CBC and CMP today.  Most significantly her platelet count is 82.  This is at the low end of her typical range from the 80s to 120s.  Her hemoglobin is stable to improved 11.8 and her WBC count is normal at 6.1.  The patient has almost completed her aromatase inhibitor.     Review of Systems   Constitutional: Positive for fatigue. Negative for appetite change, diaphoresis, fever, unexpected weight gain and unexpected weight loss.   HENT: Negative for hearing loss, mouth sores, sore throat, swollen glands, trouble swallowing and voice change.    Eyes: Negative for blurred vision.   Respiratory: Negative for cough, shortness of breath and wheezing.    Cardiovascular: Negative for chest pain and palpitations.   Gastrointestinal: Negative for abdominal pain, blood in stool, constipation, diarrhea, nausea and vomiting.   Endocrine: Negative for cold  intolerance and heat intolerance.   Genitourinary: Negative for difficulty urinating, dysuria, frequency, hematuria and urinary incontinence.   Musculoskeletal: Positive for neck pain. Negative for arthralgias, back pain and myalgias.   Skin: Negative for rash, skin lesions and wound.   Neurological: Negative for dizziness, seizures, weakness, numbness and headache.   Hematological: Does not bruise/bleed easily.   Psychiatric/Behavioral: Negative for depressed mood. The patient is not nervous/anxious.    All other systems reviewed and are negative.      Past Medical History:   Diagnosis Date   • Breast cancer (HCC)     LEFT BREAST   • Carotid artery calcification     pt is going to have endarectomy 22   • Diabetes mellitus, type II (HCC)    • High cholesterol      Past Surgical History:   Procedure Laterality Date   • BREAST BIOPSY     • BREAST LUMPECTOMY     • SPINE SURGERY     • TUBAL ABDOMINAL LIGATION       Social History     Socioeconomic History   • Marital status:    Tobacco Use   • Smoking status: Former Smoker     Packs/day: 1.00     Years: 30.00     Pack years: 30.00     Quit date:      Years since quittin.0   • Smokeless tobacco: Never Used   Vaping Use   • Vaping Use: Never used   Substance and Sexual Activity   • Alcohol use: Never   • Drug use: Never   • Sexual activity: Defer     Family History   Problem Relation Age of Onset   • Lung cancer Father        Objective   Physical Exam  General: Alert, cooperative, no acute distress  Eyes: Anicteric sclera, PERRLA  Respiratory: normal respiratory effort  Cardiovascular: no lower extremity edema  Skin: Normal tone, no rash, no lesions  Psychiatric: Appropriate affect, intact judgment  Neurologic: No focal sensory or motor deficits, normal cognition   Musculoskeletal: Normal muscle strength and tone  Extremities: No clubbing, cyanosis, or deformities    Vitals:    22 0938   BP: 180/55   Pulse: 90   Resp: 16   Temp: 98.8 °F (37.1 °C)    SpO2: 93%   Weight: 95.9 kg (211 lb 6.7 oz)   PainSc: 0-No pain     ECOG score: 1         PHQ-9 Total Score: 0       Result Review :   The following data was reviewed by: Asiya Boss MD PhD on 01/11/2022:  Lab Results   Component Value Date    HGB 11.8 (L) 01/11/2022    HCT 36.7 01/11/2022    MCV 93.6 01/11/2022    PLT 82 (L) 01/11/2022    WBC 6.10 01/11/2022    NEUTROABS 4.88 01/11/2022    LYMPHSABS 0.67 (L) 01/11/2022    MONOSABS 0.37 01/11/2022    EOSABS 0.12 01/11/2022    BASOSABS 0.03 01/11/2022     Lab Results   Component Value Date    GLUCOSE 233 (H) 01/11/2022    BUN 18 01/11/2022    CREATININE 1.14 (H) 01/11/2022     01/11/2022    K 4.1 01/11/2022     01/11/2022    CO2 26.0 01/11/2022    CALCIUM 9.5 01/11/2022    PROTEINTOT 6.8 01/11/2022    ALBUMIN 4.41 01/11/2022    BILITOT 0.5 01/11/2022    ALKPHOS 100 01/11/2022    AST 23 01/11/2022    ALT 29 01/11/2022          Assessment and Plan    Diagnoses and all orders for this visit:    1. Ductal carcinoma in situ (DCIS) of left breast (Primary)    2. Thrombocytopenia (HCC)    3. Type 2 diabetes mellitus without complication, unspecified whether long term insulin use (HCC)  -     Hemoglobin A1c; Future    4. Breast cancer screening by mammogram  -     Mammo Screening Bilateral With CAD; Future      DCIS: The patient will complete anastrozole soon.  I will plan to repeat her bone density test in February 2023.  Her next mammogram is due in May which we can schedule now.  She will follow-up with me after to discuss results.    Type 2 diabetes: The patient has requested we check her A1c so that her primary care provider can follow-up.    Thrombocytopenia: This has been presumed to be secondary to liver disease.  She also has splenomegaly.  I will refer the patient to Dr. Diaz in gastroenterology for evaluation and treatment.    Patient was given instructions and counseling regarding her condition or for health maintenance advice. Please see  specific information pulled into the AVS if appropriate.     Asiya Boss MD PhD    1/24/2022

## 2022-01-13 ENCOUNTER — TRANSCRIBE ORDERS (OUTPATIENT)
Dept: ADMINISTRATIVE | Facility: HOSPITAL | Age: 62
End: 2022-01-13

## 2022-01-13 DIAGNOSIS — Z12.31 VISIT FOR SCREENING MAMMOGRAM: Primary | ICD-10-CM

## 2022-01-20 ENCOUNTER — APPOINTMENT (OUTPATIENT)
Dept: CT IMAGING | Facility: HOSPITAL | Age: 62
End: 2022-01-20

## 2022-01-24 PROBLEM — D69.6 THROMBOCYTOPENIA: Status: ACTIVE | Noted: 2022-01-24

## 2022-01-26 NOTE — PROGRESS NOTES
Chief Complaint   Fatty liver, gastroesophageal reflux disease, and irritable bowel syndrome with diarrhea    History of Present Illness       Airam Pool is a 61 y.o. female who presents to [facility] for follow-up with a history of reflux, diabetes, diarrhea, cholecystectomy, fatty liver and breast cancer.  At the last office visit patient's reflux was well controlled on Protonix 40 mg daily and Carafate 1 g 4 times a day.  Patient reports she is being referred from Dr. Boss for thrombocytopenia hepatomegaly and abnormal CT scan of the esophagus displaying esophageal varices.  Patient denies any family history of liver disease or liver cancer.  Patient denies use of alcohol, IV drug abuse, hepatitis exposure, Tylenol use.  Patient denies abdominal swelling, confusion, leg swelling, jaundice.  She reports easily bruising.  Patient reports stage 0 breast cancer that was removed using a lumpectomy.  Patient denies any elevation in her liver enzymes prior.  Patient denies fever, nausea, vomiting, weight loss, night sweats, melena, hematochezia, hematemesis.    Endoscopy: Review of the patient's most recent EGD and colonoscopy performed by Dr. Fischer on 5/27/2021 deformity of the IC valve unable to intubate terminal ileum possible Crohn's.  Single polyp removed from the ascending colon normal esophagus stomach and duodenum.  Pathology reveals tubular adenoma.     CT scan of the abdomen and pelvis with contrast 5/27/2021 normal 6/8/2021 revealed hepatic steatosis, splenomegaly, gastroesophageal varices consistent with portal hypertension     Prometheus testing negative on 6/2121      Results       Result Review :       CMP    CMP 8/26/21 10/12/21 1/11/22   Glucose 224 (A) 238 (A) 233 (A)   BUN 15 15 18   Creatinine 0.87 1.19 (A) 1.14 (A)   eGFR Non  Am 67 46 (A) 49 (A)   Sodium 138 139 139   Potassium 3.9 4.3 4.1   Chloride 98 100 100   Calcium 9.9 9.7 9.5   Albumin 4.60  4.04 4.41   Total Bilirubin 0.7 0.4 0.5   Alkaline Phosphatase 92 77 100   AST (SGOT) 26 15 23   ALT (SGPT) 25 12 29   (A) Abnormal value            CBC    CBC 8/26/21 10/12/21 1/11/22   WBC 5.17 4.00 6.10   RBC 3.97 3.58 (A) 3.92   Hemoglobin 12.3 11.0 (A) 11.8 (A)   Hematocrit 36.7 34.6 36.7   MCV 92.4 96.6 93.6   MCH 31.0 30.7 30.1   MCHC 33.5 31.8 32.2   RDW 14.5 16.0 (A) 14.2   Platelets 118 (A) 116 (A) 82 (A)   (A) Abnormal value                      Past Medical History       Past Medical History:   Diagnosis Date   • Breast cancer (HCC)     LEFT BREAST   • Carotid artery calcification     pt is going to have endarectomy 8/24/21   • Diabetes mellitus, type II (HCC)    • GERD (gastroesophageal reflux disease)    • High cholesterol        Past Surgical History:   Procedure Laterality Date   • BREAST BIOPSY     • BREAST LUMPECTOMY     • COLONOSCOPY     • SPINE SURGERY     • TUBAL ABDOMINAL LIGATION     • UPPER GASTROINTESTINAL ENDOSCOPY           Current Outpatient Medications:   •  amLODIPine (NORVASC) 5 MG tablet, Take 10 mg by mouth Daily., Disp: , Rfl:   •  anastrozole (ARIMIDEX) 1 MG tablet, anastrozole 1 mg tablet  Take 1 tablet every day by oral route., Disp: , Rfl:   •  aspirin (aspirin) 81 MG EC tablet, Take 81 mg by mouth Daily., Disp: , Rfl:   •  Calcium Carbonate-Vitamin D3 600-400 MG-UNIT tablet, Take 2 tablets by mouth 2 (Two) Times a Day., Disp: , Rfl:   •  calcium citrate-vitamin d (CITRACAL) 200-250 MG-UNIT tablet tablet, Take  by mouth Daily., Disp: , Rfl:   •  carvedilol (COREG) 6.25 MG tablet, Take 6.25 mg by mouth 2 (Two) Times a Day With Meals., Disp: , Rfl:   •  dicyclomine (BENTYL) 20 MG tablet, Take 1 tablet by mouth Every 6 (Six) Hours., Disp: 60 tablet, Rfl: 3  •  gabapentin (NEURONTIN) 300 MG capsule, Take 600 mg by mouth 3 (Three) Times a Day., Disp: , Rfl:   •  gemfibrozil (LOPID) 600 MG tablet, gemfibrozil 600 mg oral tablet take 1 tablet (600 mg) by oral route 2 times per day 30  "minutes before morning and evening meal   Suspended, Disp: , Rfl:   •  HYDROcodone-acetaminophen (NORCO)  MG per tablet, , Disp: , Rfl:   •  insulin degludec (Tresiba FlexTouch) 100 UNIT/ML solution pen-injector injection, Tresiba FlexTouch U-100 100 unit/mL (3 mL) subcutaneous insulin pen inject by subcutaneous route as per insulin protocol   Active, Disp: , Rfl:   •  Insulin Syringe-Needle U-100 (BD Veo Insulin Syringe U/F) 31G X 15/64\" 1 ML misc, USE TO INJECT INTO THE SKIN AS NEEDED, Disp: , Rfl:   •  lisinopril-hydrochlorothiazide (PRINZIDE,ZESTORETIC) 20-25 MG per tablet, Take 1 tablet by mouth 2 (Two) Times a Day., Disp: , Rfl:   •  metFORMIN (GLUCOPHAGE) 1000 MG tablet, Every 12 (Twelve) Hours., Disp: , Rfl:   •  pantoprazole (PROTONIX) 40 MG EC tablet, Take 40 mg by mouth Daily., Disp: , Rfl:   •  pravastatin (PRAVACHOL) 80 MG tablet, Take 80 mg by mouth Daily., Disp: , Rfl:   •  sucralfate (CARAFATE) 1 g tablet, TAKE 1 TABLET BY MOUTH 4 TIMES DAILY ON AN EMPTY STOMACH ONE HOUR BEFORE EACH MEAL, Disp: , Rfl:   •  Tresiba 100 UNIT/ML solution injection, INJECT 0.9MLS SUBCUTANEOUSLY EVERY MORNING FOR 90 DAYS, Disp: , Rfl:      No Known Allergies    Family History   Problem Relation Age of Onset   • Lung cancer Father    • Colon cancer Neg Hx         Social History     Social History Narrative   • Not on file       Objective       Vital Signs:   /60 (BP Location: Right arm, Patient Position: Sitting, Cuff Size: Large Adult)   Pulse 74   Ht 160 cm (63\")   Wt 95.5 kg (210 lb 9.6 oz)   SpO2 97%   BMI 37.31 kg/m²       Physical Exam  Constitutional:       General: She is not in acute distress.     Appearance: Normal appearance. She is well-developed and normal weight.   Eyes:      Conjunctiva/sclera: Conjunctivae normal.      Pupils: Pupils are equal, round, and reactive to light.      Visual Fields: Right eye visual fields normal and left eye visual fields normal.   Cardiovascular:      Rate and " Rhythm: Normal rate and regular rhythm.      Heart sounds: Normal heart sounds.   Pulmonary:      Effort: Pulmonary effort is normal. No retractions.      Breath sounds: Normal breath sounds and air entry.      Comments: Inspection of chest: normal appearance  Abdominal:      General: Bowel sounds are normal.      Palpations: Abdomen is soft.      Tenderness: There is no abdominal tenderness.      Comments: No appreciable hepatosplenomegaly   Musculoskeletal:      Cervical back: Neck supple.      Right lower leg: No edema.      Left lower leg: No edema.   Lymphadenopathy:      Cervical: No cervical adenopathy.   Skin:     Findings: No lesion.      Comments: Turgor normal   Neurological:      Mental Status: She is alert and oriented to person, place, and time.   Psychiatric:         Mood and Affect: Mood and affect normal.           Assessment & Plan          Assessment and Plan    Diagnoses and all orders for this visit:    1. Fatty liver (Primary)    2. Gastroesophageal reflux disease without esophagitis    3. Irritable bowel syndrome with diarrhea    4. Thrombocytopenia (HCC)    5. Abnormal CT scan, esophagus      61-year-old female presented to the office today with a history of fatty liver, hepatomegaly, abnormal CT scan of the esophagus displaying varices, reflux, IBS D.  I have recommended that the patient undergo further evaluation with an EGD.  I have discussed this procedure in detail with the patient.  I have discussed the risks, benefits and alternatives.  I have discussed the risk of anesthesia, bleeding and perforation.  Patient understands these risks, benefits and alternatives and wishes to proceed.  I will schedule her at her earliest convenience.  I have ordered further evaluation related to the patient's abnormal CT scan of the abdomen and esophagus and thrombocytopenia with the labs noted above to include: Hepatitis panel, CBC, CMP, ferritin, iron profile, PT/INR, alpha-1 antitrypsin, CHESTER, smooth  muscle, ceruloplasmin, AMA.  I have also ordered an ultrasound for evaluation of the liver.  We have discussed the possible etiology related to elevated liver enzymes.  We discussed fatty liver disease and the importance of living a healthy lifestyle to include weight loss of 10%, cutting back on carbs, sugars and fried fatty foods, and limiting intake of soda and sugary drinks.  I have recommended adding vitamin E into the patient's current regimen.  We will call the patient with lab results and ultrasound results.  Patient is agreeable to this plan will call with any questions or concerns.            Follow Up       Follow Up   Return for Follow up after endoscopy in office.  Patient was given instructions and counseling regarding her condition or for health maintenance advice. Please see specific information pulled into the AVS if appropriate.

## 2022-01-27 ENCOUNTER — LAB (OUTPATIENT)
Dept: LAB | Facility: HOSPITAL | Age: 62
End: 2022-01-27

## 2022-01-27 ENCOUNTER — OFFICE VISIT (OUTPATIENT)
Dept: GASTROENTEROLOGY | Facility: CLINIC | Age: 62
End: 2022-01-27

## 2022-01-27 ENCOUNTER — PREP FOR SURGERY (OUTPATIENT)
Dept: OTHER | Facility: HOSPITAL | Age: 62
End: 2022-01-27

## 2022-01-27 VITALS
BODY MASS INDEX: 37.32 KG/M2 | DIASTOLIC BLOOD PRESSURE: 60 MMHG | HEART RATE: 74 BPM | SYSTOLIC BLOOD PRESSURE: 120 MMHG | OXYGEN SATURATION: 97 % | WEIGHT: 210.6 LBS | HEIGHT: 63 IN

## 2022-01-27 DIAGNOSIS — D69.6 THROMBOCYTOPENIA: ICD-10-CM

## 2022-01-27 DIAGNOSIS — K76.0 HEPATIC STEATOSIS: ICD-10-CM

## 2022-01-27 DIAGNOSIS — R93.3 ABNORMAL CT SCAN, ESOPHAGUS: ICD-10-CM

## 2022-01-27 DIAGNOSIS — K76.0 FATTY LIVER: ICD-10-CM

## 2022-01-27 DIAGNOSIS — K21.9 GASTROESOPHAGEAL REFLUX DISEASE WITHOUT ESOPHAGITIS: ICD-10-CM

## 2022-01-27 DIAGNOSIS — R16.0 HEPATOMEGALY: ICD-10-CM

## 2022-01-27 DIAGNOSIS — K76.0 FATTY LIVER: Primary | ICD-10-CM

## 2022-01-27 DIAGNOSIS — K58.0 IRRITABLE BOWEL SYNDROME WITH DIARRHEA: ICD-10-CM

## 2022-01-27 LAB
ALBUMIN SERPL-MCNC: 4.7 G/DL (ref 3.5–5.2)
ALBUMIN/GLOB SERPL: 1.7 G/DL
ALP SERPL-CCNC: 80 U/L (ref 39–117)
ALT SERPL W P-5'-P-CCNC: 24 U/L (ref 1–33)
ANION GAP SERPL CALCULATED.3IONS-SCNC: 12.8 MMOL/L (ref 5–15)
AST SERPL-CCNC: 24 U/L (ref 1–32)
BASOPHILS # BLD AUTO: 0.02 10*3/MM3 (ref 0–0.2)
BASOPHILS NFR BLD AUTO: 0.4 % (ref 0–1.5)
BILIRUB SERPL-MCNC: 0.3 MG/DL (ref 0–1.2)
BUN SERPL-MCNC: 18 MG/DL (ref 8–23)
BUN/CREAT SERPL: 15 (ref 7–25)
CALCIUM SPEC-SCNC: 9.7 MG/DL (ref 8.6–10.5)
CHLORIDE SERPL-SCNC: 99 MMOL/L (ref 98–107)
CO2 SERPL-SCNC: 25.2 MMOL/L (ref 22–29)
CREAT SERPL-MCNC: 1.2 MG/DL (ref 0.57–1)
DEPRECATED RDW RBC AUTO: 46.9 FL (ref 37–54)
EOSINOPHIL # BLD AUTO: 0.14 10*3/MM3 (ref 0–0.4)
EOSINOPHIL NFR BLD AUTO: 2.9 % (ref 0.3–6.2)
ERYTHROCYTE [DISTWIDTH] IN BLOOD BY AUTOMATED COUNT: 13.8 % (ref 12.3–15.4)
FERRITIN SERPL-MCNC: 331 NG/ML (ref 13–150)
GFR SERPL CREATININE-BSD FRML MDRD: 46 ML/MIN/1.73
GLOBULIN UR ELPH-MCNC: 2.7 GM/DL
GLUCOSE SERPL-MCNC: 334 MG/DL (ref 65–99)
HAV IGM SERPL QL IA: NORMAL
HBV CORE IGM SERPL QL IA: NORMAL
HBV SURFACE AG SERPL QL IA: NORMAL
HCT VFR BLD AUTO: 38.5 % (ref 34–46.6)
HCV AB SER DONR QL: NORMAL
HGB BLD-MCNC: 12.4 G/DL (ref 12–15.9)
IMM GRANULOCYTES # BLD AUTO: 0.01 10*3/MM3 (ref 0–0.05)
IMM GRANULOCYTES NFR BLD AUTO: 0.2 % (ref 0–0.5)
INR PPP: 1.01 (ref 2–3)
LYMPHOCYTES # BLD AUTO: 0.82 10*3/MM3 (ref 0.7–3.1)
LYMPHOCYTES NFR BLD AUTO: 16.9 % (ref 19.6–45.3)
MCH RBC QN AUTO: 30.1 PG (ref 26.6–33)
MCHC RBC AUTO-ENTMCNC: 32.2 G/DL (ref 31.5–35.7)
MCV RBC AUTO: 93.4 FL (ref 79–97)
MONOCYTES # BLD AUTO: 0.23 10*3/MM3 (ref 0.1–0.9)
MONOCYTES NFR BLD AUTO: 4.7 % (ref 5–12)
NEUTROPHILS NFR BLD AUTO: 3.63 10*3/MM3 (ref 1.7–7)
NEUTROPHILS NFR BLD AUTO: 74.9 % (ref 42.7–76)
NRBC BLD AUTO-RTO: 0 /100 WBC (ref 0–0.2)
PLATELET # BLD AUTO: 121 10*3/MM3 (ref 140–450)
PMV BLD AUTO: 10.7 FL (ref 6–12)
POTASSIUM SERPL-SCNC: 4.2 MMOL/L (ref 3.5–5.2)
PROT SERPL-MCNC: 7.4 G/DL (ref 6–8.5)
PROTHROMBIN TIME: 10.6 SECONDS (ref 9.4–12)
RBC # BLD AUTO: 4.12 10*6/MM3 (ref 3.77–5.28)
SODIUM SERPL-SCNC: 137 MMOL/L (ref 136–145)
WBC NRBC COR # BLD: 4.85 10*3/MM3 (ref 3.4–10.8)

## 2022-01-27 PROCEDURE — 86038 ANTINUCLEAR ANTIBODIES: CPT

## 2022-01-27 PROCEDURE — 84466 ASSAY OF TRANSFERRIN: CPT | Performed by: NURSE PRACTITIONER

## 2022-01-27 PROCEDURE — 85025 COMPLETE CBC W/AUTO DIFF WBC: CPT

## 2022-01-27 PROCEDURE — 85610 PROTHROMBIN TIME: CPT | Performed by: NURSE PRACTITIONER

## 2022-01-27 PROCEDURE — 86015 ACTIN ANTIBODY EACH: CPT

## 2022-01-27 PROCEDURE — 82103 ALPHA-1-ANTITRYPSIN TOTAL: CPT

## 2022-01-27 PROCEDURE — 99214 OFFICE O/P EST MOD 30 MIN: CPT | Performed by: NURSE PRACTITIONER

## 2022-01-27 PROCEDURE — 82390 ASSAY OF CERULOPLASMIN: CPT

## 2022-01-27 PROCEDURE — 82104 ALPHA-1-ANTITRYPSIN PHENO: CPT

## 2022-01-27 PROCEDURE — 82728 ASSAY OF FERRITIN: CPT

## 2022-01-27 PROCEDURE — U0004 COV-19 TEST NON-CDC HGH THRU: HCPCS

## 2022-01-27 PROCEDURE — 36415 COLL VENOUS BLD VENIPUNCTURE: CPT

## 2022-01-27 PROCEDURE — 83540 ASSAY OF IRON: CPT | Performed by: NURSE PRACTITIONER

## 2022-01-27 PROCEDURE — 86225 DNA ANTIBODY NATIVE: CPT

## 2022-01-27 PROCEDURE — 86381 MITOCHONDRIAL ANTIBODY EACH: CPT

## 2022-01-27 PROCEDURE — 80074 ACUTE HEPATITIS PANEL: CPT

## 2022-01-27 PROCEDURE — C9803 HOPD COVID-19 SPEC COLLECT: HCPCS

## 2022-01-27 PROCEDURE — 80053 COMPREHEN METABOLIC PANEL: CPT

## 2022-01-28 LAB
ACTIN IGG SERPL-ACNC: 10 UNITS (ref 0–19)
CERULOPLASMIN SERPL-MCNC: 26 MG/DL (ref 19–39)
IRON 24H UR-MRATE: 42 MCG/DL (ref 37–145)
IRON SATN MFR SERPL: 11 % (ref 20–50)
MITOCHONDRIA M2 IGG SER-ACNC: <20 UNITS (ref 0–20)
SARS-COV-2 RNA PNL SPEC NAA+PROBE: NOT DETECTED
TIBC SERPL-MCNC: 371 MCG/DL (ref 298–536)
TRANSFERRIN SERPL-MCNC: 249 MG/DL (ref 200–360)

## 2022-01-28 RX ORDER — GLIPIZIDE 10 MG/1
10 TABLET ORAL
COMMUNITY

## 2022-01-28 RX ORDER — AMLODIPINE BESYLATE 10 MG/1
10 TABLET ORAL DAILY
COMMUNITY

## 2022-01-29 LAB
DSDNA IGG SERPL IA-ACNC: NEGATIVE [IU]/ML
NUCLEAR IGG SER IA-RTO: NEGATIVE

## 2022-01-31 ENCOUNTER — TRANSCRIBE ORDERS (OUTPATIENT)
Dept: ADMINISTRATIVE | Facility: HOSPITAL | Age: 62
End: 2022-01-31

## 2022-01-31 ENCOUNTER — ANESTHESIA (OUTPATIENT)
Dept: GASTROENTEROLOGY | Facility: HOSPITAL | Age: 62
End: 2022-01-31

## 2022-01-31 ENCOUNTER — HOSPITAL ENCOUNTER (OUTPATIENT)
Facility: HOSPITAL | Age: 62
Setting detail: HOSPITAL OUTPATIENT SURGERY
Discharge: HOME | End: 2022-01-31
Attending: INTERNAL MEDICINE | Admitting: INTERNAL MEDICINE

## 2022-01-31 ENCOUNTER — ANESTHESIA EVENT (OUTPATIENT)
Dept: GASTROENTEROLOGY | Facility: HOSPITAL | Age: 62
End: 2022-01-31

## 2022-01-31 VITALS
TEMPERATURE: 97.8 F | BODY MASS INDEX: 36.8 KG/M2 | OXYGEN SATURATION: 94 % | SYSTOLIC BLOOD PRESSURE: 145 MMHG | WEIGHT: 207.67 LBS | DIASTOLIC BLOOD PRESSURE: 51 MMHG | HEART RATE: 53 BPM | RESPIRATION RATE: 18 BRPM | HEIGHT: 63 IN

## 2022-01-31 DIAGNOSIS — R93.3 ABNORMAL CT SCAN, ESOPHAGUS: ICD-10-CM

## 2022-01-31 DIAGNOSIS — Z98.1 ARTHRODESIS STATUS: Primary | ICD-10-CM

## 2022-01-31 DIAGNOSIS — K76.0 FATTY LIVER: ICD-10-CM

## 2022-01-31 DIAGNOSIS — D69.6 THROMBOCYTOPENIA: ICD-10-CM

## 2022-01-31 DIAGNOSIS — K21.9 GASTROESOPHAGEAL REFLUX DISEASE WITHOUT ESOPHAGITIS: ICD-10-CM

## 2022-01-31 LAB
A1AT PHENOTYP SERPL IFE: NORMAL
A1AT SERPL-MCNC: 132 MG/DL (ref 101–187)
GLUCOSE BLDC GLUCOMTR-MCNC: 107 MG/DL (ref 70–99)

## 2022-01-31 PROCEDURE — 43239 EGD BIOPSY SINGLE/MULTIPLE: CPT | Performed by: INTERNAL MEDICINE

## 2022-01-31 PROCEDURE — 88342 IMHCHEM/IMCYTCHM 1ST ANTB: CPT | Performed by: INTERNAL MEDICINE

## 2022-01-31 PROCEDURE — 25010000002 PROPOFOL 10 MG/ML EMULSION: Performed by: NURSE ANESTHETIST, CERTIFIED REGISTERED

## 2022-01-31 PROCEDURE — 88305 TISSUE EXAM BY PATHOLOGIST: CPT | Performed by: INTERNAL MEDICINE

## 2022-01-31 PROCEDURE — 82962 GLUCOSE BLOOD TEST: CPT

## 2022-01-31 RX ORDER — SODIUM CHLORIDE, SODIUM LACTATE, POTASSIUM CHLORIDE, CALCIUM CHLORIDE 600; 310; 30; 20 MG/100ML; MG/100ML; MG/100ML; MG/100ML
1000 INJECTION, SOLUTION INTRAVENOUS CONTINUOUS
Status: DISCONTINUED | OUTPATIENT
Start: 2022-01-31 | End: 2022-01-31 | Stop reason: HOSPADM

## 2022-01-31 RX ORDER — SODIUM CHLORIDE, SODIUM LACTATE, POTASSIUM CHLORIDE, CALCIUM CHLORIDE 600; 310; 30; 20 MG/100ML; MG/100ML; MG/100ML; MG/100ML
30 INJECTION, SOLUTION INTRAVENOUS CONTINUOUS
Status: DISCONTINUED | OUTPATIENT
Start: 2022-01-31 | End: 2022-01-31 | Stop reason: HOSPADM

## 2022-01-31 RX ORDER — LIDOCAINE HYDROCHLORIDE 20 MG/ML
INJECTION, SOLUTION INFILTRATION; PERINEURAL AS NEEDED
Status: DISCONTINUED | OUTPATIENT
Start: 2022-01-31 | End: 2022-01-31 | Stop reason: SURG

## 2022-01-31 RX ADMIN — PROPOFOL 175 MCG/KG/MIN: 10 INJECTION, EMULSION INTRAVENOUS at 07:54

## 2022-01-31 RX ADMIN — SODIUM CHLORIDE, POTASSIUM CHLORIDE, SODIUM LACTATE AND CALCIUM CHLORIDE 1000 ML: 600; 310; 30; 20 INJECTION, SOLUTION INTRAVENOUS at 06:34

## 2022-01-31 RX ADMIN — LIDOCAINE HYDROCHLORIDE 100 MG: 20 INJECTION, SOLUTION INFILTRATION; PERINEURAL at 07:54

## 2022-01-31 NOTE — ANESTHESIA PREPROCEDURE EVALUATION
Anesthesia Evaluation     Patient summary reviewed and Nursing notes reviewed   no history of anesthetic complications:  NPO Solid Status: > 8 hours  NPO Liquid Status: > 2 hours           Airway   Mallampati: II  TM distance: >3 FB  Neck ROM: full  No difficulty expected  Dental      Pulmonary - negative pulmonary ROS and normal exam    breath sounds clear to auscultation  Cardiovascular - normal exam  Exercise tolerance: good (4-7 METS)    Rhythm: regular  Rate: normal    (+) hypertension, hyperlipidemia,  carotid artery disease      Neuro/Psych  (+) headaches,     GI/Hepatic/Renal/Endo    (+)  GERD,  liver disease, diabetes mellitus,     Musculoskeletal     (+) neck pain,   Abdominal    Substance History - negative use     OB/GYN negative ob/gyn ROS         Other - negative ROS                       Anesthesia Plan    ASA 2     general   (Total IV Anesthesia    Patient understands anesthesia not responsible for dental damage.  )  intravenous induction     Anesthetic plan, all risks, benefits, and alternatives have been provided, discussed and informed consent has been obtained with: patient.    Plan discussed with CRNA.        CODE STATUS:

## 2022-01-31 NOTE — ANESTHESIA POSTPROCEDURE EVALUATION
Patient: Airam Pool    Procedure Summary     Date: 01/31/22 Room / Location: [facility] ENDOSCOPY 2 / [facility] ENDOSCOPY    Anesthesia Start: 0743 Anesthesia Stop: 0802    Procedure: ESOPHAGOGASTRODUODENOSCOPY WITH BX (N/A ) Diagnosis:       Fatty liver      Gastroesophageal reflux disease without esophagitis      Thrombocytopenia (HCC)      Abnormal CT scan, esophagus      (Fatty liver [K76.0])      (Gastroesophageal reflux disease without esophagitis [K21.9])      (Thrombocytopenia (HCC) [D69.6])      (Abnormal CT scan, esophagus [R93.3])    Surgeons: Star Fischer MD Provider: Andrew Santana MD    Anesthesia Type: general ASA Status: 2          Anesthesia Type: general    Vitals  Vitals Value Taken Time   /51 01/31/22 0825   Temp 36.6 °C (97.8 °F) 01/31/22 0825   Pulse 55 01/31/22 0826   Resp 18 01/31/22 0825   SpO2 96 % 01/31/22 0826   Vitals shown include unvalidated device data.        Post Anesthesia Care and Evaluation    Patient location during evaluation: bedside  Patient participation: complete - patient participated  Level of consciousness: awake and alert  Pain management: adequate  Airway patency: patent  Anesthetic complications: No anesthetic complications  PONV Status: none  Cardiovascular status: acceptable  Respiratory status: acceptable  Hydration status: acceptable    Comments: An Anesthesiologist personally participated in the most demanding procedures (including induction and emergence if applicable) in the anesthesia plan, monitored the course of anesthesia administration at frequent intervals and remained physically present and available for immediate diagnosis and treatment of emergencies.

## 2022-02-01 LAB
CYTO UR: NORMAL
LAB AP CASE REPORT: NORMAL
LAB AP CLINICAL INFORMATION: NORMAL
LAB AP SPECIAL STAINS: NORMAL
PATH REPORT.FINAL DX SPEC: NORMAL
PATH REPORT.GROSS SPEC: NORMAL

## 2022-02-02 ENCOUNTER — HOSPITAL ENCOUNTER (OUTPATIENT)
Dept: ULTRASOUND IMAGING | Facility: HOSPITAL | Age: 62
Discharge: HOME | End: 2022-02-02
Admitting: NURSE PRACTITIONER

## 2022-02-02 DIAGNOSIS — K76.0 FATTY LIVER: ICD-10-CM

## 2022-02-02 DIAGNOSIS — R93.3 ABNORMAL CT SCAN, ESOPHAGUS: ICD-10-CM

## 2022-02-02 DIAGNOSIS — R16.0 HEPATOMEGALY: ICD-10-CM

## 2022-02-02 DIAGNOSIS — K21.9 GASTROESOPHAGEAL REFLUX DISEASE WITHOUT ESOPHAGITIS: ICD-10-CM

## 2022-02-02 DIAGNOSIS — D69.6 THROMBOCYTOPENIA: ICD-10-CM

## 2022-02-02 DIAGNOSIS — K58.0 IRRITABLE BOWEL SYNDROME WITH DIARRHEA: ICD-10-CM

## 2022-02-02 DIAGNOSIS — K76.0 HEPATIC STEATOSIS: ICD-10-CM

## 2022-02-02 PROCEDURE — 76705 ECHO EXAM OF ABDOMEN: CPT

## 2022-02-11 ENCOUNTER — HOSPITAL ENCOUNTER (OUTPATIENT)
Dept: CT IMAGING | Facility: HOSPITAL | Age: 62
Discharge: HOME | End: 2022-02-11
Admitting: STUDENT IN AN ORGANIZED HEALTH CARE EDUCATION/TRAINING PROGRAM

## 2022-02-11 DIAGNOSIS — Z98.1 ARTHRODESIS STATUS: ICD-10-CM

## 2022-02-11 PROCEDURE — 72125 CT NECK SPINE W/O DYE: CPT

## 2022-04-04 RX ORDER — SUCRALFATE 1 G/1
1 TABLET ORAL 4 TIMES DAILY
Qty: 120 TABLET | Refills: 2 | Status: SHIPPED | OUTPATIENT
Start: 2022-04-04 | End: 2022-05-04

## 2022-04-27 RX ORDER — ANASTROZOLE 1 MG/1
TABLET ORAL
Qty: 90 TABLET | Refills: 2 | Status: SHIPPED | OUTPATIENT
Start: 2022-04-27 | End: 2022-08-02

## 2022-05-02 ENCOUNTER — OFFICE VISIT (OUTPATIENT)
Dept: GASTROENTEROLOGY | Facility: CLINIC | Age: 62
End: 2022-05-02

## 2022-05-02 VITALS
HEART RATE: 65 BPM | OXYGEN SATURATION: 97 % | BODY MASS INDEX: 37.86 KG/M2 | WEIGHT: 213.7 LBS | HEIGHT: 63 IN | DIASTOLIC BLOOD PRESSURE: 84 MMHG | SYSTOLIC BLOOD PRESSURE: 122 MMHG

## 2022-05-02 DIAGNOSIS — D69.6 THROMBOCYTOPENIA: ICD-10-CM

## 2022-05-02 DIAGNOSIS — K75.81 NASH (NONALCOHOLIC STEATOHEPATITIS): ICD-10-CM

## 2022-05-02 DIAGNOSIS — K76.0 FATTY LIVER: Primary | ICD-10-CM

## 2022-05-02 DIAGNOSIS — K21.9 GASTROESOPHAGEAL REFLUX DISEASE WITHOUT ESOPHAGITIS: ICD-10-CM

## 2022-05-02 DIAGNOSIS — K74.60 CIRRHOSIS OF LIVER WITHOUT ASCITES, UNSPECIFIED HEPATIC CIRRHOSIS TYPE: ICD-10-CM

## 2022-05-02 PROCEDURE — 99214 OFFICE O/P EST MOD 30 MIN: CPT | Performed by: NURSE PRACTITIONER

## 2022-05-02 RX ORDER — ACETAMINOPHEN AND CODEINE PHOSPHATE 300; 60 MG/1; MG/1
TABLET ORAL
COMMUNITY
Start: 2022-04-13 | End: 2022-08-02

## 2022-05-09 ENCOUNTER — HOSPITAL ENCOUNTER (OUTPATIENT)
Dept: MAMMOGRAPHY | Facility: HOSPITAL | Age: 62
Discharge: HOME | End: 2022-05-09
Admitting: INTERNAL MEDICINE

## 2022-05-09 ENCOUNTER — APPOINTMENT (OUTPATIENT)
Dept: MAMMOGRAPHY | Facility: HOSPITAL | Age: 62
End: 2022-05-09

## 2022-05-09 DIAGNOSIS — Z12.31 VISIT FOR SCREENING MAMMOGRAM: ICD-10-CM

## 2022-05-09 PROCEDURE — 77063 BREAST TOMOSYNTHESIS BI: CPT

## 2022-05-09 PROCEDURE — 77067 SCR MAMMO BI INCL CAD: CPT

## 2022-05-17 ENCOUNTER — OFFICE VISIT (OUTPATIENT)
Dept: ONCOLOGY | Facility: HOSPITAL | Age: 62
End: 2022-05-17

## 2022-05-17 VITALS
TEMPERATURE: 97.7 F | SYSTOLIC BLOOD PRESSURE: 160 MMHG | BODY MASS INDEX: 37.78 KG/M2 | RESPIRATION RATE: 16 BRPM | HEART RATE: 66 BPM | WEIGHT: 213.19 LBS | DIASTOLIC BLOOD PRESSURE: 65 MMHG | OXYGEN SATURATION: 96 %

## 2022-05-17 DIAGNOSIS — D05.12 DUCTAL CARCINOMA IN SITU (DCIS) OF LEFT BREAST: Primary | ICD-10-CM

## 2022-05-17 DIAGNOSIS — R16.1 SPLENOMEGALY: ICD-10-CM

## 2022-05-17 DIAGNOSIS — D69.6 THROMBOCYTOPENIA: ICD-10-CM

## 2022-05-17 DIAGNOSIS — K75.81 LIVER CIRRHOSIS SECONDARY TO NASH: ICD-10-CM

## 2022-05-17 DIAGNOSIS — K74.60 LIVER CIRRHOSIS SECONDARY TO NASH: ICD-10-CM

## 2022-05-17 PROBLEM — M79.18 MYOFASCIAL PAIN SYNDROME: Status: ACTIVE | Noted: 2022-04-01

## 2022-05-17 PROBLEM — Z98.1 HISTORY OF FUSION OF CERVICAL SPINE: Status: ACTIVE | Noted: 2022-03-17

## 2022-05-17 PROBLEM — Z79.4 ENCOUNTER FOR LONG-TERM (CURRENT) USE OF INSULIN: Status: ACTIVE | Noted: 2022-04-01

## 2022-05-17 PROCEDURE — 99214 OFFICE O/P EST MOD 30 MIN: CPT | Performed by: INTERNAL MEDICINE

## 2022-05-17 PROCEDURE — G0463 HOSPITAL OUTPT CLINIC VISIT: HCPCS | Performed by: INTERNAL MEDICINE

## 2022-05-17 RX ORDER — HYDROCODONE BITARTRATE AND ACETAMINOPHEN 5; 325 MG/1; MG/1
TABLET ORAL
COMMUNITY

## 2022-05-17 RX ORDER — PROCHLORPERAZINE 25 MG/1
SUPPOSITORY RECTAL SEE ADMIN INSTRUCTIONS
COMMUNITY
Start: 2022-05-05

## 2022-05-17 RX ORDER — SUCRALFATE 1 G/1
TABLET ORAL
COMMUNITY
End: 2022-08-02 | Stop reason: SDUPTHER

## 2022-05-17 RX ORDER — PEN NEEDLE, DIABETIC 32GX 5/32"
NEEDLE, DISPOSABLE MISCELLANEOUS
COMMUNITY
Start: 2022-05-11

## 2022-05-17 RX ORDER — PROCHLORPERAZINE 25 MG/1
SUPPOSITORY RECTAL
COMMUNITY
Start: 2022-05-05

## 2022-05-17 RX ORDER — INSULIN LISPRO 100 [IU]/ML
INJECTION, SOLUTION SUBCUTANEOUS
COMMUNITY
Start: 2022-05-05

## 2022-05-17 NOTE — PROGRESS NOTES
Patient  Airam Pool    Location  [facility]    Chief Complaint  Breast Cancer    Referring Provider: ALEXIS Kolb  PCP: Ольга Bhagat APRN    Subjective          Oncology/Hematology History Overview Note   ) LEFT breast calcification: CNbx: DCIS. (1/11/17): size: 5 mm, solid, cribiform, micropapillary, low to intermediate grade, no necrosis, calcifications present: ER (+) 95%, PA (+) 90%.     Treatment:     1) Left breast, excision with  needle lumpectomy: dx: (2/3/17) negative for DCIS. Stage: pTis (DCIS).   2) Radiation consult for radiation: Radiation not recommended by Dr. Kaufman. (3/6/2017)  3) Dexa scan: osteopenia in the femoral head. (3/3017)  4) Arimidex  1mg QD started. (4/13/17)  5) Mammogram was benign. (2/2/18) (2/7/19)     Ductal carcinoma in situ (DCIS) of left breast   1/1/2017 Initial Diagnosis    Breast cancer (CMS/HCC)         HPI  Patient comes in today to follow-up after her recent mammogram.  Fortunately this was negative for breast cancer recurrence.  She has also reached the 5-year ambar since her diagnosis of DCIS.  I advised her that she can stop Arimidex which she was very happy about.  She ask then if she could stop coming to clinic and return to seeing her primary care provider.  Following this request we discussed her history of thrombocytopenia.  She has been seeing Dr. Fischer and his nurse practitioner in gastroenterology for her diagnosis of cirrhosis.  I reviewed most recent records from the clinic which show that she has Obando cirrhosis with no evidence of esophageal varices.    Patient generally feels well and has no new complaints or concerns.    Review of Systems   Constitutional: Positive for fatigue. Negative for appetite change, diaphoresis, fever, unexpected weight gain and unexpected weight loss.   HENT: Negative for hearing loss, sore throat and voice change.    Eyes: Negative for blurred vision, double vision, pain,  redness and visual disturbance.   Respiratory: Negative for cough, shortness of breath and wheezing.    Cardiovascular: Negative for chest pain, palpitations and leg swelling.   Endocrine: Negative for cold intolerance, heat intolerance, polydipsia and polyuria.   Genitourinary: Negative for decreased urine volume, difficulty urinating, frequency and urinary incontinence.   Musculoskeletal: Negative for arthralgias, back pain, joint swelling and myalgias.   Skin: Negative for color change, rash, skin lesions and wound.   Neurological: Negative for dizziness, seizures, numbness and headache.   Hematological: Negative for adenopathy. Does not bruise/bleed easily.   Psychiatric/Behavioral: Negative for depressed mood. The patient is not nervous/anxious.    All other systems reviewed and are negative.      Past Medical History:   Diagnosis Date   • Breast cancer (HCC)     LEFT BREAST   • Carotid artery calcification     pt is going to have endarectomy 21   • Diabetes mellitus, type II (HCC)    • GERD (gastroesophageal reflux disease)    • High cholesterol    • Hypertension      Past Surgical History:   Procedure Laterality Date   • BACK SURGERY     • BREAST BIOPSY     • BREAST LUMPECTOMY      left   • CAROTID ENDARTERECTOMY Left    • COLONOSCOPY     • ENDOSCOPY N/A 2022    Procedure: ESOPHAGOGASTRODUODENOSCOPY WITH BX;  Surgeon: Star Fischer MD;  Location: [facility] END[facility];  Service: Gastroenterology;  Laterality: N/A;  NORMAL EGD   • SPINE SURGERY     • TUBAL ABDOMINAL LIGATION     • UPPER GASTROINTESTINAL ENDOSCOPY       Social History     Socioeconomic History   • Marital status:    Tobacco Use   • Smoking status: Former Smoker     Packs/day: 1.00     Years: 30.00     Pack years: 30.00     Quit date:      Years since quittin.3   • Smokeless tobacco: Never Used   Vaping Use   • Vaping Use: Never used   Substance and Sexual Activity   • Alcohol use: Never   • Drug use: Never   •  Sexual activity: Defer     Family History   Problem Relation Age of Onset   • Lung cancer Father    • Cancer Father    • Cancer Paternal Uncle    • Colon cancer Neg Hx    • Malig Hyperthermia Neg Hx        Objective   Physical Exam  General: Alert, cooperative, no acute distress  Eyes: Anicteric sclera, PERRLA  Respiratory: normal respiratory effort  Cardiovascular: no lower extremity edema  Skin: Normal tone, no rash, no lesions  Psychiatric: Appropriate affect, intact judgment  Neurologic: No focal sensory or motor deficits, normal cognition   Musculoskeletal: Normal muscle strength and tone  Extremities: No clubbing, cyanosis, or deformities    Vitals:    05/17/22 1439   BP: 160/65   Pulse: 66   Resp: 16   Temp: 97.7 °F (36.5 °C)   SpO2: 96%   Weight: 96.7 kg (213 lb 3 oz)   PainSc:   8   PainLoc: Neck               PHQ-9 Total Score:         Result Review :   The following data was reviewed by: Asiya Boss MD PhD on 05/17/2022:  Lab Results   Component Value Date    HGB 12.0 02/02/2022    HCT 37.1 02/02/2022    MCV 94.6 02/02/2022     (L) 02/02/2022    WBC 4.96 02/02/2022    NEUTROABS 3.42 02/02/2022    LYMPHSABS 1.08 02/02/2022    MONOSABS 0.30 02/02/2022    EOSABS 0.10 02/02/2022    BASOSABS 0.05 02/02/2022     Lab Results   Component Value Date    GLUCOSE 334 (H) 01/27/2022    BUN 18 01/27/2022    CREATININE 1.20 (H) 01/27/2022     01/27/2022    K 4.2 01/27/2022    CL 99 01/27/2022    CO2 25.2 01/27/2022    CALCIUM 9.7 01/27/2022    PROTEINTOT 7.4 01/27/2022    ALBUMIN 4.70 01/27/2022    BILITOT 0.3 01/27/2022    ALKPHOS 80 01/27/2022    AST 24 01/27/2022    ALT 24 01/27/2022          Assessment and Plan    Diagnoses and all orders for this visit:    1. Ductal carcinoma in situ (DCIS) of left breast (Primary)    2. Thrombocytopenia (HCC)    3. Splenomegaly    4. 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  She will now stop the  medication.  Her recent mammogram was negative so she will need a repeat screening mammogram in 12 months.  She will also need repeat DEXA scan in February 2023 to monitor for ongoing toxicity from the AI.  She should continue these every 2 years given her risk of osteoporosis.    Thrombocytopenia: Patient has had a platelet count which has been stable around 100,000.  This is likely secondary to Obando cirrhosis with splenomegaly.  I gave the patient the option of repeating her CBC today but she elected to follow-up with her primary care provider.  At this time she does not need further evaluation but if her platelet count drops significantly or she develops other significant cytopenias or abnormalities that I am happy to see her back in clinic for further evaluation.    Patient was given instructions and counseling regarding her condition or for health maintenance advice. Please see specific information pulled into the AVS if appropriate.

## 2022-05-26 ENCOUNTER — TELEPHONE (OUTPATIENT)
Dept: OTHER | Facility: HOSPITAL | Age: 62
End: 2022-05-26

## 2022-05-27 ENCOUNTER — LAB (OUTPATIENT)
Dept: LAB | Facility: HOSPITAL | Age: 62
End: 2022-05-27

## 2022-05-27 ENCOUNTER — PROCEDURE VISIT (OUTPATIENT)
Dept: OTHER | Facility: HOSPITAL | Age: 62
End: 2022-05-27

## 2022-05-27 DIAGNOSIS — K76.0 FATTY LIVER: Primary | ICD-10-CM

## 2022-05-27 DIAGNOSIS — K74.60 CIRRHOSIS OF LIVER WITHOUT ASCITES, UNSPECIFIED HEPATIC CIRRHOSIS TYPE: ICD-10-CM

## 2022-05-27 DIAGNOSIS — D69.6 THROMBOCYTOPENIA: ICD-10-CM

## 2022-05-27 DIAGNOSIS — K76.0 FATTY LIVER: ICD-10-CM

## 2022-05-27 DIAGNOSIS — K21.9 GASTROESOPHAGEAL REFLUX DISEASE WITHOUT ESOPHAGITIS: ICD-10-CM

## 2022-05-27 DIAGNOSIS — K75.81 NASH (NONALCOHOLIC STEATOHEPATITIS): ICD-10-CM

## 2022-05-27 LAB
DEPRECATED RDW RBC AUTO: 46.3 FL (ref 37–54)
ERYTHROCYTE [DISTWIDTH] IN BLOOD BY AUTOMATED COUNT: 14.1 % (ref 12.3–15.4)
HCT VFR BLD AUTO: 34.8 % (ref 34–46.6)
HGB BLD-MCNC: 11.6 G/DL (ref 12–15.9)
MCH RBC QN AUTO: 30 PG (ref 26.6–33)
MCHC RBC AUTO-ENTMCNC: 33.3 G/DL (ref 31.5–35.7)
MCV RBC AUTO: 89.9 FL (ref 79–97)
PLATELET # BLD AUTO: 93 10*3/MM3 (ref 140–450)
PMV BLD AUTO: 10.6 FL (ref 6–12)
RBC # BLD AUTO: 3.87 10*6/MM3 (ref 3.77–5.28)
WBC NRBC COR # BLD: 4.42 10*3/MM3 (ref 3.4–10.8)

## 2022-05-27 PROCEDURE — 85027 COMPLETE CBC AUTOMATED: CPT

## 2022-05-27 PROCEDURE — 91200 LIVER ELASTOGRAPHY: CPT | Performed by: NURSE PRACTITIONER

## 2022-05-27 PROCEDURE — 36415 COLL VENOUS BLD VENIPUNCTURE: CPT

## 2022-05-31 NOTE — PROGRESS NOTES
Liver Elastography  Performed by: Ina Todd APRN  Authorized by: Sarah Ramsey APRN   Ordering Provider: Sarah Ramsey APRN    Probe:  M+  Procedure Details:  Procedure: After providing an oral and written explanation of the Fibroscan vibration controlled transient elastography (VTCE) test procedure to the patient. The patient was placed in supine position with right arm in maximum abduction to allow optimal exposure of right lateral abdomen. Patient was briefly assessed, identifying terminus of the xyphoid process and locating an ideal transient elastography testing site, midline and lateral to this point. Patient was instructed to breathe normally and remain stationary during the test process. Pre-measurement data confirmed the transient elastography probe was centered over the liver parenchyma. A series of ten 50 Hz mechanical pulses were applied with controlled application pressure to induce a mechanical shear wave in the liver tissue. For each measurement, the shear wave propagation speed was detected, displayed and converted to its equivalent liver stiffness value in kilopascals. Skin to liver capsules distance and shear wave characteristics were monitored during the entire examination to assure quality data. Median liver stiffness measurement and interquartile range were calculated and displayed in real time. Acquired measurement data was stored and submitted to the provider for review and interpretation. Patient tolerated the procedure well and was discharged without incident.   Clinical Information:     NPO 3 Hours or More: Yes      Actively Drinking: No    Findings:     Median Liver Stiffness Score:  12.1    Interquartile Range (IQR) to Median Ratio:  8    Adry Stiffness Consistent with:  F3 Significant Fibrosis    Current Scan Considered Reliab: Yes      Median Controlled Attenuation Parameter (dB/m):  280    IQR:  19    CAP SCORE:  Moderate/severe liver fat

## 2022-06-01 ENCOUNTER — HOSPITAL ENCOUNTER (OUTPATIENT)
Dept: ULTRASOUND IMAGING | Facility: HOSPITAL | Age: 62
Discharge: HOME | End: 2022-06-01
Admitting: NURSE PRACTITIONER

## 2022-06-01 ENCOUNTER — TELEPHONE (OUTPATIENT)
Dept: GASTROENTEROLOGY | Facility: CLINIC | Age: 62
End: 2022-06-01

## 2022-06-01 DIAGNOSIS — D69.6 THROMBOCYTOPENIA: ICD-10-CM

## 2022-06-01 DIAGNOSIS — K75.81 NASH (NONALCOHOLIC STEATOHEPATITIS): ICD-10-CM

## 2022-06-01 DIAGNOSIS — K76.0 FATTY LIVER: ICD-10-CM

## 2022-06-01 DIAGNOSIS — K21.9 GASTROESOPHAGEAL REFLUX DISEASE WITHOUT ESOPHAGITIS: ICD-10-CM

## 2022-06-01 DIAGNOSIS — K74.60 CIRRHOSIS OF LIVER WITHOUT ASCITES, UNSPECIFIED HEPATIC CIRRHOSIS TYPE: ICD-10-CM

## 2022-06-01 PROCEDURE — 76705 ECHO EXAM OF ABDOMEN: CPT

## 2022-06-01 NOTE — TELEPHONE ENCOUNTER
----- Message from ALEXIS Ramirez sent at 5/31/2022  3:53 PM EDT -----  Please call the patient and inform her that I reviewed her FibroScan.  She has an S score of S3 showing steatosis of moderate level and an average score of F3 showing moderate fibrosis.  Educate the patient on healthy lifestyle diet and weight loss can help to improve fatty liver.

## 2022-06-02 NOTE — TELEPHONE ENCOUNTER
Spoke to pt and informed of Sarah ESPINOZA result note and recommendations. Educated and mailed pt on hepatic steatosis and recommended diet modifications. Pt verified understanding.

## 2022-06-10 ENCOUNTER — TRANSCRIBE ORDERS (OUTPATIENT)
Dept: ADMINISTRATIVE | Facility: HOSPITAL | Age: 62
End: 2022-06-10

## 2022-06-10 DIAGNOSIS — I65.23 BILATERAL CAROTID ARTERY STENOSIS: Primary | ICD-10-CM

## 2022-06-30 ENCOUNTER — HOSPITAL ENCOUNTER (OUTPATIENT)
Dept: CARDIOLOGY | Facility: HOSPITAL | Age: 62
Discharge: HOME | End: 2022-06-30
Admitting: SURGERY

## 2022-06-30 DIAGNOSIS — I65.23 BILATERAL CAROTID ARTERY STENOSIS: ICD-10-CM

## 2022-06-30 LAB
BH CV XLRA MEAS LEFT CAROTID BULB EDV: 13 CM/SEC
BH CV XLRA MEAS LEFT CAROTID BULB PSV: 83 CM/SEC
BH CV XLRA MEAS LEFT DIST CCA EDV: 25 CM/SEC
BH CV XLRA MEAS LEFT DIST CCA PSV: 98 CM/SEC
BH CV XLRA MEAS LEFT DIST ICA EDV: 52 CM/SEC
BH CV XLRA MEAS LEFT DIST ICA PSV: 233 CM/SEC
BH CV XLRA MEAS LEFT MID ICA EDV: 53 CM/SEC
BH CV XLRA MEAS LEFT MID ICA PSV: 220 CM/SEC
BH CV XLRA MEAS LEFT PROX CCA EDV: 19 CM/SEC
BH CV XLRA MEAS LEFT PROX CCA PSV: 78 CM/SEC
BH CV XLRA MEAS LEFT PROX ECA EDV: 26 CM/SEC
BH CV XLRA MEAS LEFT PROX ECA PSV: 193 CM/SEC
BH CV XLRA MEAS LEFT PROX ICA EDV: 62 CM/SEC
BH CV XLRA MEAS LEFT PROX ICA PSV: 232 CM/SEC
BH CV XLRA MEAS LEFT VERTEBRAL A EDV: 18 CM/SEC
BH CV XLRA MEAS LEFT VERTEBRAL A PSV: 70 CM/SEC
BH CV XLRA MEAS RIGHT CAROTID BULB EDV: 27 CM/SEC
BH CV XLRA MEAS RIGHT CAROTID BULB PSV: 164 CM/SEC
BH CV XLRA MEAS RIGHT DIST CCA EDV: 22 CM/SEC
BH CV XLRA MEAS RIGHT DIST CCA PSV: 102 CM/SEC
BH CV XLRA MEAS RIGHT DIST ICA EDV: 17 CM/SEC
BH CV XLRA MEAS RIGHT DIST ICA PSV: 85 CM/SEC
BH CV XLRA MEAS RIGHT MID ICA EDV: 19 CM/SEC
BH CV XLRA MEAS RIGHT MID ICA PSV: 126 CM/SEC
BH CV XLRA MEAS RIGHT PROX CCA EDV: 19 CM/SEC
BH CV XLRA MEAS RIGHT PROX CCA PSV: 103 CM/SEC
BH CV XLRA MEAS RIGHT PROX ECA EDV: 17 CM/SEC
BH CV XLRA MEAS RIGHT PROX ECA PSV: 161 CM/SEC
BH CV XLRA MEAS RIGHT PROX ICA EDV: 23 CM/SEC
BH CV XLRA MEAS RIGHT PROX ICA PSV: 155 CM/SEC
BH CV XLRA MEAS RIGHT VERTEBRAL A EDV: 15 CM/SEC
BH CV XLRA MEAS RIGHT VERTEBRAL A PSV: 53 CM/SEC
LEFT ARM BP: NORMAL MMHG
MAXIMAL PREDICTED HEART RATE: 160 BPM
RIGHT ARM BP: NORMAL MMHG
STRESS TARGET HR: 136 BPM

## 2022-06-30 PROCEDURE — 93880 EXTRACRANIAL BILAT STUDY: CPT

## 2022-06-30 PROCEDURE — 93880 EXTRACRANIAL BILAT STUDY: CPT | Performed by: SURGERY

## 2022-07-18 ENCOUNTER — TRANSCRIBE ORDERS (OUTPATIENT)
Dept: ADMINISTRATIVE | Facility: HOSPITAL | Age: 62
End: 2022-07-18

## 2022-07-18 DIAGNOSIS — Z12.2 ENCOUNTER FOR SCREENING FOR LUNG CANCER: Primary | ICD-10-CM

## 2022-08-02 ENCOUNTER — OFFICE VISIT (OUTPATIENT)
Dept: GASTROENTEROLOGY | Facility: CLINIC | Age: 62
End: 2022-08-02

## 2022-08-02 VITALS
HEIGHT: 63 IN | SYSTOLIC BLOOD PRESSURE: 153 MMHG | WEIGHT: 218 LBS | DIASTOLIC BLOOD PRESSURE: 72 MMHG | OXYGEN SATURATION: 98 % | BODY MASS INDEX: 38.62 KG/M2 | HEART RATE: 67 BPM

## 2022-08-02 DIAGNOSIS — K21.9 GASTROESOPHAGEAL REFLUX DISEASE WITHOUT ESOPHAGITIS: ICD-10-CM

## 2022-08-02 DIAGNOSIS — K76.0 FATTY LIVER: ICD-10-CM

## 2022-08-02 DIAGNOSIS — K75.81 LIVER CIRRHOSIS SECONDARY TO NASH: Primary | ICD-10-CM

## 2022-08-02 DIAGNOSIS — K74.60 LIVER CIRRHOSIS SECONDARY TO NASH: Primary | ICD-10-CM

## 2022-08-02 PROCEDURE — 99214 OFFICE O/P EST MOD 30 MIN: CPT | Performed by: NURSE PRACTITIONER

## 2022-08-02 RX ORDER — ESOMEPRAZOLE MAGNESIUM 40 MG/1
40 CAPSULE, DELAYED RELEASE ORAL 2 TIMES DAILY
Qty: 30 CAPSULE | Refills: 5 | Status: SHIPPED | OUTPATIENT
Start: 2022-08-02 | End: 2022-12-02 | Stop reason: SDUPTHER

## 2022-08-02 RX ORDER — SUCRALFATE 1 G/1
1 TABLET ORAL 4 TIMES DAILY
Qty: 120 TABLET | Refills: 3 | Status: SHIPPED | OUTPATIENT
Start: 2022-08-02 | End: 2022-12-02

## 2022-08-02 NOTE — PATIENT INSTRUCTIONS
Food Choices for Gastroesophageal Reflux Disease, Adult  When you have gastroesophageal reflux disease (GERD), the foods you eat and your eating habits are very important. Choosing the right foods can help ease your discomfort. Think about working with a food expert (dietitian) to help you make good choices.  What are tips for following this plan?  Reading food labels  Look for foods that are low in saturated fat. Foods that may help with your symptoms include:  Foods that have less than 5% of daily value (DV) of fat.  Foods that have 0 grams of trans fat.  Cooking  Do not oh your food.  Cook your food by baking, steaming, grilling, or broiling. These are all methods that do not need a lot of fat for cooking.  To add flavor, try to use herbs that are low in spice and acidity.  Meal planning    Choose healthy foods that are low in fat, such as:  Fruits and vegetables.  Whole grains.  Low-fat dairy products.  Lean meats, fish, and poultry.  Eat small meals often instead of eating 3 large meals each day. Eat your meals slowly in a place where you are relaxed. Avoid bending over or lying down until 2-3 hours after eating.  Limit high-fat foods such as fatty meats or fried foods.  Limit your intake of fatty foods, such as oils, butter, and shortening.  Avoid the following as told by your doctor:  Foods that cause symptoms. These may be different for different people. Keep a food diary to keep track of foods that cause symptoms.  Alcohol.  Drinking a lot of liquid with meals.  Eating meals during the 2-3 hours before bed.    Lifestyle  Stay at a healthy weight. Ask your doctor what weight is healthy for you. If you need to lose weight, work with your doctor to do so safely.  Exercise for at least 30 minutes on 5 or more days each week, or as told by your doctor.  Wear loose-fitting clothes.  Do not smoke or use any products that contain nicotine or tobacco. If you need help quitting, ask your doctor.  Sleep with the head  of your bed higher than your feet. Use a wedge under the mattress or blocks under the bed frame to raise the head of the bed.  Chew sugar-free gum after meals.  What foods should eat?    Eat a healthy, well-balanced diet of fruits, vegetables, whole grains, low-fat dairy products, lean meats, fish, and poultry. Each person is different.  Foods that may cause symptoms in one person may not cause any symptoms in another person. Work with your doctor to find foods that are safe for you.  The items listed above may not be a complete list of what you can eat and drink. Contact a food expert for more options.  What foods should I avoid?  Limiting some of these foods may help in managing the symptoms of GERD. Everyone is different. Talk with a food expert or your doctor to help you find the exact foods to avoid, if any.  Fruits  Any fruits prepared with added fat. Any fruits that cause symptoms. For some people, this may include citrus fruits, such as oranges, grapefruit, pineapple, and bennett.  Vegetables  Deep-fried vegetables. French fries. Any vegetables prepared with added fat. Any vegetables that cause symptoms. For some people, this may include tomatoes and tomato products, chili peppers, onions and garlic, and horseradish.  Grains  Pastries or quick breads with added fat.  Meats and other proteins  High-fat meats, such as fatty beef or pork, hot dogs, ribs, ham, sausage, salami, and dominique. Fried meat or protein, including fried fish and fried chicken. Nuts and nut butters, in large amounts.  Dairy  Whole milk and chocolate milk. Sour cream. Cream. Ice cream. Cream cheese. Milkshakes.  Fats and oils  Butter. Margarine. Shortening. Ghee.  Beverages  Coffee and tea, with or without caffeine. Carbonated beverages. Sodas. Energy drinks. Fruit juice made with acidic fruits, such as orange or grapefruit. Tomato juice. Alcoholic drinks.  Sweets and desserts  Chocolate and cocoa. Donuts.  Seasonings and condiments  Pepper.  Peppermint and spearmint. Added salt. Any condiments, herbs, or seasonings that cause symptoms. For some people, this may include david, hot sauce, or vinegar-based salad dressings.  The items listed above may not be a complete list of what you should not eat and drink. Contact a food expert for more options.  Questions to ask your doctor  Diet and lifestyle changes are often the first steps that are taken to manage symptoms of GERD. If diet and lifestyle changes do not help, talk with your doctor about taking medicines.  Where to find more information  [employer]: aboutgerd.org  Summary  When you have GERD, food and lifestyle choices are very important in easing your symptoms.  Eat small meals often instead of 3 large meals a day. Eat your meals slowly and in a place where you are relaxed.  Avoid bending over or lying down until 2-3 hours after eating.  Limit high-fat foods such as fatty meats or fried foods.  This information is not intended to replace advice given to you by your health care provider. Make sure you discuss any questions you have with your health care provider.  Document Revised: 06/28/2021 Document Reviewed: 06/28/2021  Elsevier Patient Education © 2021 [employer]

## 2022-08-04 ENCOUNTER — APPOINTMENT (OUTPATIENT)
Dept: CT IMAGING | Facility: HOSPITAL | Age: 62
End: 2022-08-04

## 2022-08-22 ENCOUNTER — TELEPHONE (OUTPATIENT)
Dept: GASTROENTEROLOGY | Facility: CLINIC | Age: 62
End: 2022-08-22

## 2022-08-22 NOTE — TELEPHONE ENCOUNTER
I received a vm from Ms Pool stating her Nexium needs a PA..    I have submitted the PA thru Cover[employer]Meds.     I called Ms Pool back, went straight to vm. Left her a detailed vm of this. (ok per JOHNATHON).    Oliverio

## 2022-09-02 ENCOUNTER — TRANSCRIBE ORDERS (OUTPATIENT)
Dept: ADMINISTRATIVE | Facility: HOSPITAL | Age: 62
End: 2022-09-02

## 2022-09-02 DIAGNOSIS — Z78.0 POST-MENOPAUSAL: Primary | ICD-10-CM

## 2022-09-08 ENCOUNTER — APPOINTMENT (OUTPATIENT)
Dept: BONE DENSITY | Facility: HOSPITAL | Age: 62
End: 2022-09-08

## 2022-11-28 ENCOUNTER — HOSPITAL ENCOUNTER (OUTPATIENT)
Dept: ULTRASOUND IMAGING | Facility: HOSPITAL | Age: 62
Discharge: HOME | End: 2022-11-28

## 2022-11-28 ENCOUNTER — LAB (OUTPATIENT)
Dept: LAB | Facility: HOSPITAL | Age: 62
End: 2022-11-28

## 2022-11-28 DIAGNOSIS — K21.9 GASTROESOPHAGEAL REFLUX DISEASE WITHOUT ESOPHAGITIS: ICD-10-CM

## 2022-11-28 DIAGNOSIS — K75.81 LIVER CIRRHOSIS SECONDARY TO NASH: ICD-10-CM

## 2022-11-28 DIAGNOSIS — K74.60 LIVER CIRRHOSIS SECONDARY TO NASH: ICD-10-CM

## 2022-11-28 DIAGNOSIS — K76.0 FATTY LIVER: ICD-10-CM

## 2022-11-28 LAB
ALBUMIN SERPL-MCNC: 4.3 G/DL (ref 3.5–5.2)
ALBUMIN/GLOB SERPL: 1.8 G/DL
ALP SERPL-CCNC: 85 U/L (ref 39–117)
ALPHA-FETOPROTEIN: <2 NG/ML (ref 0–8.3)
ALT SERPL W P-5'-P-CCNC: 25 U/L (ref 1–33)
ANION GAP SERPL CALCULATED.3IONS-SCNC: 12.2 MMOL/L (ref 5–15)
AST SERPL-CCNC: 26 U/L (ref 1–32)
BILIRUB SERPL-MCNC: 0.3 MG/DL (ref 0–1.2)
BUN SERPL-MCNC: 16 MG/DL (ref 8–23)
BUN/CREAT SERPL: 15.7 (ref 7–25)
CALCIUM SPEC-SCNC: 9.2 MG/DL (ref 8.6–10.5)
CHLORIDE SERPL-SCNC: 103 MMOL/L (ref 98–107)
CO2 SERPL-SCNC: 23.8 MMOL/L (ref 22–29)
CREAT SERPL-MCNC: 1.02 MG/DL (ref 0.57–1)
DEPRECATED RDW RBC AUTO: 43.8 FL (ref 37–54)
EGFRCR SERPLBLD CKD-EPI 2021: 62.7 ML/MIN/1.73
ERYTHROCYTE [DISTWIDTH] IN BLOOD BY AUTOMATED COUNT: 13.5 % (ref 12.3–15.4)
GLOBULIN UR ELPH-MCNC: 2.4 GM/DL
GLUCOSE SERPL-MCNC: 262 MG/DL (ref 65–99)
HCT VFR BLD AUTO: 34.4 % (ref 34–46.6)
HGB BLD-MCNC: 11.4 G/DL (ref 12–15.9)
INR PPP: 0.97 (ref 0.86–1.15)
MCH RBC QN AUTO: 29.3 PG (ref 26.6–33)
MCHC RBC AUTO-ENTMCNC: 33.1 G/DL (ref 31.5–35.7)
MCV RBC AUTO: 88.4 FL (ref 79–97)
PLATELET # BLD AUTO: 85 10*3/MM3 (ref 140–450)
PMV BLD AUTO: 10.9 FL (ref 6–12)
POTASSIUM SERPL-SCNC: 4.1 MMOL/L (ref 3.5–5.2)
PROT SERPL-MCNC: 6.7 G/DL (ref 6–8.5)
PROTHROMBIN TIME: 13 SECONDS (ref 11.8–14.9)
RBC # BLD AUTO: 3.89 10*6/MM3 (ref 3.77–5.28)
SODIUM SERPL-SCNC: 139 MMOL/L (ref 136–145)
WBC NRBC COR # BLD: 4.04 10*3/MM3 (ref 3.4–10.8)

## 2022-11-28 PROCEDURE — 85027 COMPLETE CBC AUTOMATED: CPT

## 2022-11-28 PROCEDURE — 76705 ECHO EXAM OF ABDOMEN: CPT

## 2022-11-28 PROCEDURE — 36415 COLL VENOUS BLD VENIPUNCTURE: CPT

## 2022-11-28 PROCEDURE — 82105 ALPHA-FETOPROTEIN SERUM: CPT

## 2022-11-28 PROCEDURE — 80053 COMPREHEN METABOLIC PANEL: CPT

## 2022-11-28 PROCEDURE — 85610 PROTHROMBIN TIME: CPT

## 2022-12-02 ENCOUNTER — OFFICE VISIT (OUTPATIENT)
Dept: GASTROENTEROLOGY | Facility: CLINIC | Age: 62
End: 2022-12-02

## 2022-12-02 VITALS
DIASTOLIC BLOOD PRESSURE: 66 MMHG | OXYGEN SATURATION: 98 % | HEIGHT: 63 IN | WEIGHT: 218.1 LBS | BODY MASS INDEX: 38.64 KG/M2 | HEART RATE: 72 BPM | SYSTOLIC BLOOD PRESSURE: 160 MMHG

## 2022-12-02 DIAGNOSIS — K75.81 LIVER CIRRHOSIS SECONDARY TO NASH: Primary | ICD-10-CM

## 2022-12-02 DIAGNOSIS — K74.60 LIVER CIRRHOSIS SECONDARY TO NASH: Primary | ICD-10-CM

## 2022-12-02 PROCEDURE — 99214 OFFICE O/P EST MOD 30 MIN: CPT | Performed by: NURSE PRACTITIONER

## 2022-12-02 RX ORDER — TIRZEPATIDE 2.5 MG/.5ML
INJECTION, SOLUTION SUBCUTANEOUS
COMMUNITY

## 2022-12-02 RX ORDER — ESOMEPRAZOLE MAGNESIUM 40 MG/1
40 CAPSULE, DELAYED RELEASE ORAL 2 TIMES DAILY
Qty: 60 CAPSULE | Refills: 5 | Status: SHIPPED | OUTPATIENT
Start: 2022-12-02

## 2022-12-02 RX ORDER — ESOMEPRAZOLE MAGNESIUM 40 MG/1
40 CAPSULE, DELAYED RELEASE ORAL 2 TIMES DAILY
Qty: 30 CAPSULE | Refills: 5 | Status: SHIPPED | OUTPATIENT
Start: 2022-12-02 | End: 2022-12-02

## 2022-12-02 RX ORDER — ESOMEPRAZOLE MAGNESIUM 40 MG/1
40 CAPSULE, DELAYED RELEASE ORAL
Qty: 30 CAPSULE | Refills: 5 | Status: SHIPPED | OUTPATIENT
Start: 2022-12-02 | End: 2022-12-02

## 2022-12-02 RX ORDER — INSULIN GLARGINE 300 U/ML
INJECTION, SOLUTION SUBCUTANEOUS
COMMUNITY
Start: 2022-11-16

## 2023-01-06 ENCOUNTER — TRANSCRIBE ORDERS (OUTPATIENT)
Dept: ADMINISTRATIVE | Facility: HOSPITAL | Age: 63
End: 2023-01-06
Payer: COMMERCIAL

## 2023-01-06 DIAGNOSIS — I77.9 DISEASE OF ARTERY: Primary | ICD-10-CM

## 2023-01-10 ENCOUNTER — TRANSCRIBE ORDERS (OUTPATIENT)
Dept: ADMINISTRATIVE | Facility: HOSPITAL | Age: 63
End: 2023-01-10
Payer: COMMERCIAL

## 2023-01-10 DIAGNOSIS — Z12.2 ENCOUNTER FOR SCREENING FOR LUNG CANCER: Primary | ICD-10-CM

## 2023-01-16 ENCOUNTER — HOSPITAL ENCOUNTER (OUTPATIENT)
Dept: CARDIOLOGY | Facility: HOSPITAL | Age: 63
Discharge: HOME | End: 2023-01-16
Admitting: SURGERY
Payer: COMMERCIAL

## 2023-01-16 DIAGNOSIS — I77.9 DISEASE OF ARTERY: ICD-10-CM

## 2023-01-16 LAB
BH CV XLRA MEAS LEFT CAROTID BULB EDV: 12 CM/SEC
BH CV XLRA MEAS LEFT CAROTID BULB PSV: 70 CM/SEC
BH CV XLRA MEAS LEFT DIST CCA EDV: 14 CM/SEC
BH CV XLRA MEAS LEFT DIST CCA PSV: 72 CM/SEC
BH CV XLRA MEAS LEFT DIST ICA EDV: 17 CM/SEC
BH CV XLRA MEAS LEFT DIST ICA PSV: 78 CM/SEC
BH CV XLRA MEAS LEFT MID ICA EDV: 47 CM/SEC
BH CV XLRA MEAS LEFT MID ICA PSV: 127 CM/SEC
BH CV XLRA MEAS LEFT PROX CCA EDV: 26 CM/SEC
BH CV XLRA MEAS LEFT PROX CCA PSV: 81 CM/SEC
BH CV XLRA MEAS LEFT PROX ECA EDV: 16 CM/SEC
BH CV XLRA MEAS LEFT PROX ECA PSV: 96 CM/SEC
BH CV XLRA MEAS LEFT PROX ICA EDV: 48 CM/SEC
BH CV XLRA MEAS LEFT PROX ICA PSV: 200 CM/SEC
BH CV XLRA MEAS LEFT VERTEBRAL A EDV: 10 CM/SEC
BH CV XLRA MEAS LEFT VERTEBRAL A PSV: 44 CM/SEC
BH CV XLRA MEAS RIGHT CAROTID BULB EDV: 21 CM/SEC
BH CV XLRA MEAS RIGHT CAROTID BULB PSV: 98 CM/SEC
BH CV XLRA MEAS RIGHT DIST CCA EDV: 19 CM/SEC
BH CV XLRA MEAS RIGHT DIST CCA PSV: 90 CM/SEC
BH CV XLRA MEAS RIGHT DIST ICA EDV: 24 CM/SEC
BH CV XLRA MEAS RIGHT DIST ICA PSV: 69 CM/SEC
BH CV XLRA MEAS RIGHT MID ICA EDV: 21 CM/SEC
BH CV XLRA MEAS RIGHT MID ICA PSV: 77 CM/SEC
BH CV XLRA MEAS RIGHT PROX CCA EDV: 15 CM/SEC
BH CV XLRA MEAS RIGHT PROX CCA PSV: 76 CM/SEC
BH CV XLRA MEAS RIGHT PROX ECA EDV: 13 CM/SEC
BH CV XLRA MEAS RIGHT PROX ECA PSV: 128 CM/SEC
BH CV XLRA MEAS RIGHT PROX ICA EDV: 24 CM/SEC
BH CV XLRA MEAS RIGHT PROX ICA PSV: 144 CM/SEC
BH CV XLRA MEAS RIGHT VERTEBRAL A EDV: 14 CM/SEC
BH CV XLRA MEAS RIGHT VERTEBRAL A PSV: 55 CM/SEC
LEFT ARM BP: NORMAL MMHG
MAXIMAL PREDICTED HEART RATE: 159 BPM
RIGHT ARM BP: NORMAL MMHG
STRESS TARGET HR: 135 BPM

## 2023-01-16 PROCEDURE — 93880 EXTRACRANIAL BILAT STUDY: CPT | Performed by: SURGERY

## 2023-01-16 PROCEDURE — 93880 EXTRACRANIAL BILAT STUDY: CPT

## 2023-01-17 ENCOUNTER — TRANSCRIBE ORDERS (OUTPATIENT)
Dept: ADMINISTRATIVE | Facility: HOSPITAL | Age: 63
End: 2023-01-17
Payer: COMMERCIAL

## 2023-01-17 DIAGNOSIS — Z12.31 ENCOUNTER FOR SCREENING MAMMOGRAM FOR BREAST CANCER: Primary | ICD-10-CM

## 2023-02-08 ENCOUNTER — HOSPITAL ENCOUNTER (OUTPATIENT)
Dept: BONE DENSITY | Facility: HOSPITAL | Age: 63
Discharge: HOME | End: 2023-02-08
Payer: COMMERCIAL

## 2023-02-08 ENCOUNTER — HOSPITAL ENCOUNTER (OUTPATIENT)
Dept: CT IMAGING | Facility: HOSPITAL | Age: 63
Discharge: HOME | End: 2023-02-08
Payer: COMMERCIAL

## 2023-02-08 DIAGNOSIS — Z78.0 POST-MENOPAUSAL: ICD-10-CM

## 2023-02-08 DIAGNOSIS — Z12.2 ENCOUNTER FOR SCREENING FOR LUNG CANCER: ICD-10-CM

## 2023-02-08 PROCEDURE — 71271 CT THORAX LUNG CANCER SCR C-: CPT

## 2023-03-01 NOTE — PROGRESS NOTES
Chief Complaint   Follow up    History of Present Illness       Airam Pool is a 62 y.o. female who presents to [facility] for follow-up  with a history of reflux, diabetes, diarrhea, cholecystectomy, fatty liver, breast cancer and suspected compensated cirrhosis secondary to [place]. .  This follow-up was to assess how Nexium was affecting her reflux.  She continues with reflux despite therapy and feels that has worsened over the past few months.  She has breakthrough reflux at night.  She is sleeping on 4 pillows propped up. Patient has previously trialed and failed over-the-counter antacid, Tums, Pepto-Bismol, Protonix, omeprazole, Pepcid and Carafate for her reflux.   Patient reports she continues with diarrhea which she has had for the past 3 years 4-5 times per day.  She does take over-the-counter Imodium with no relief.  Patient is a diabetic and is currently on metformin Mounjaro and insulin.  Patient denies family history of colon cancer. Patient denies abdominal swelling, jaundice, fever, vomiting, weight loss, night sweats, melena, hematochezia, hematemesis.    EGD: Review of the patient's most recent EGD performed by Dr. Fischer on 01/31/2022 normal esophagus no evidence of varices in the entire esophagus.  Normal stomach and duodenum.     Endoscopy: Review of the patient's most recent EGD and colonoscopy performed by Dr. Fischer on 5/27/2021 deformity of the IC valve unable to intubate terminal ileum possible Crohn's.  Single polyp removed from the ascending colon normal esophagus stomach and duodenum.  Pathology reveals tubular adenoma.     CT scan of the abdomen and pelvis with contrast 5/27/2021 normal 6/8/2021 revealed hepatic steatosis, splenomegaly, gastroesophageal varices consistent with portal hypertension     Prometheus testing negative on 6/21/21     Labs preformed on 05.05.2022 and 05.27.2022 see below     Fibroscan preformed on 05.27.2022 S3 F3     US  abdomen limited on 11.28.2022 hepatomegaly, steatosis and cirrhotic changes.  Splenomegaly  Most recent labs on 11.28.2022 see below.  Reviewed in office.  Results       Result Review :       CMP    CMP 11/28/22   Glucose 262 (A)   BUN 16   Creatinine 1.02 (A)   eGFR 62.7   Sodium 139   Potassium 4.1   Chloride 103   Calcium 9.2   Total Protein 6.7   Albumin 4.30   Globulin 2.4   Total Bilirubin 0.3   Alkaline Phosphatase 85   AST (SGOT) 26   ALT (SGPT) 25   Albumin/Globulin Ratio 1.8   BUN/Creatinine Ratio 15.7   Anion Gap 12.2   (A) Abnormal value       Comments are available for some flowsheets but are not being displayed.           CBC    CBC 5/27/22 11/28/22   WBC 4.42 4.04   RBC 3.87 3.89   Hemoglobin 11.6 (A) 11.4 (A)   Hematocrit 34.8 34.4   MCV 89.9 88.4   MCH 30.0 29.3   MCHC 33.3 33.1   RDW 14.1 13.5   Platelets 93 (A) 85 (A)   (A) Abnormal value                          Past Medical History       Past Medical History:   Diagnosis Date   • Breast cancer (HCC)     LEFT BREAST   • Carotid artery calcification     pt is going to have endarectomy 8/24/21   • Diabetes mellitus, type II (HCC)    • GERD (gastroesophageal reflux disease)    • High cholesterol    • Hypertension        Past Surgical History:   Procedure Laterality Date   • BACK SURGERY     • BREAST BIOPSY     • BREAST LUMPECTOMY      left   • CAROTID ENDARTERECTOMY Left    • COLONOSCOPY  05/2021    luis alberto   • ENDOSCOPY N/A 01/31/2022    Procedure: ESOPHAGOGASTRODUODENOSCOPY WITH BX;  Surgeon: Star Fischer MD;  Location: [facility] ENDOSCOPY;  Service: Gastroenterology;  Laterality: N/A;  NORMAL EGD   • SPINE SURGERY     • TUBAL ABDOMINAL LIGATION     • UPPER GASTROINTESTINAL ENDOSCOPY           Current Outpatient Medications:   •  amLODIPine (NORVASC) 10 MG tablet, Take 1 tablet by mouth Daily., Disp: , Rfl:   •  aspirin 81 MG EC tablet, Take 1 tablet by mouth Daily. Last dose Friday 1/28, Disp: , Rfl:   •  BD Pen Needle Awilda U/F 32G X 4 MM  "misc, INJECT using 1 FOUR TIMES DAILY before meals and nightly, Disp: , Rfl:   •  Calcium Carbonate-Vitamin D3 600-400 MG-UNIT tablet, Take 2 tablets by mouth 2 (Two) Times a Day., Disp: , Rfl:   •  carvedilol (COREG) 6.25 MG tablet, Take 1 tablet by mouth 2 (Two) Times a Day With Meals., Disp: , Rfl:   •  Continuous Blood Gluc  (Dexcom G6 ) device, See Admin Instructions., Disp: , Rfl:   •  Continuous Blood Gluc Sensor (Dexcom G6 Sensor), apply 1 topically EVERY 10 days, Disp: , Rfl:   •  Continuous Blood Gluc Transmit (Dexcom G6 Transmitter) misc, See Admin Instructions., Disp: , Rfl:   •  DULoxetine (CYMBALTA) 20 MG capsule, Take 1 capsule by mouth Daily., Disp: , Rfl:   •  esomeprazole (nexIUM) 40 MG capsule, Take 1 capsule by mouth 2 (Two) Times a Day., Disp: 60 capsule, Rfl: 5  •  gabapentin (NEURONTIN) 300 MG capsule, Take 2 capsules by mouth 2 (Two) Times a Day., Disp: , Rfl:   •  glipizide (GLUCOTROL) 10 MG tablet, Take 1 tablet by mouth 2 (Two) Times a Day Before Meals., Disp: , Rfl:   •  HYDROcodone-acetaminophen (NORCO) 5-325 MG per tablet, hydrocodone 5 mg-acetaminophen 325 mg tablet  TAKE 1 TABLET BY MOUTH EVERY 8 TO 12 HOURS AS NEEDED FOR SEVERE PAIN. FILL DATE 05/12/2022, Disp: , Rfl:   •  Insulin Lispro, 0.5 Unit Dial, (HumaLOG Drew KwikPen) 100 UNIT/ML solution pen-injector, Humalog Drew KwikPen (U-100) 100 unit/mL subcutaneous half-unit pen  INJECT 6 UNITS TWICE DAILY before lunch and at dinner., Disp: , Rfl:   •  Insulin Syringe-Needle U-100 (BD Veo Insulin Syringe U/F) 31G X 15/64\" 1 ML misc, USE TO INJECT INTO THE SKIN AS NEEDED, Disp: , Rfl:   •  lisinopril-hydrochlorothiazide (PRINZIDE,ZESTORETIC) 20-25 MG per tablet, Take 1 tablet by mouth 2 (Two) Times a Day., Disp: , Rfl:   •  metFORMIN (GLUCOPHAGE) 1000 MG tablet, Take 1 tablet by mouth Every 12 (Twelve) Hours. Hold Sunday pm and Monday am, Disp: , Rfl:   •  pravastatin (PRAVACHOL) 80 MG tablet, Take 1 tablet by mouth " "Every Night., Disp: , Rfl:   •  Tirzepatide (Mounjaro) 2.5 MG/0.5ML solution pen-injector, Mounjaro 2.5 mg/0.5 mL subcutaneous pen injector  inject 0.5ml SUBCUTANEOUSLY EVERY 7 DAYS, Disp: , Rfl:   •  Rickie Sterling SoloStar 300 UNIT/ML solution pen-injector injection, INJECT 130 UNITS SUBCUTANEOUSLY ONCE DAILY, Disp: , Rfl:   •  colestipol (Colestid) 1 g tablet, Take 2 tablets by mouth every night at bedtime for 30 days., Disp: 60 tablet, Rfl: 2  •  insulin degludec (Tresiba FlexTouch) 100 UNIT/ML solution pen-injector injection, Inject 100 Units under the skin into the appropriate area as directed Daily With Dinner., Disp: , Rfl:   •  Tresiba 100 UNIT/ML solution injection, Inject 100 Units under the skin into the appropriate area as directed Daily With Dinner., Disp: , Rfl:      No Known Allergies    Family History   Problem Relation Age of Onset   • Lung cancer Father    • Cancer Father    • Cancer Paternal Uncle    • Colon cancer Neg Hx    • Malig Hyperthermia Neg Hx         Social History     Social History Narrative   • Not on file       Objective   Vital Signs:   /60 (BP Location: Right arm, Patient Position: Sitting, Cuff Size: Adult)   Pulse 60   Ht 160 cm (63\")   Wt 95.1 kg (209 lb 9.6 oz)   SpO2 98%   BMI 37.13 kg/m²       Physical Exam  Constitutional:       General: She is not in acute distress.     Appearance: Normal appearance. She is well-developed and normal weight.   Eyes:      Conjunctiva/sclera: Conjunctivae normal.      Pupils: Pupils are equal, round, and reactive to light.      Visual Fields: Right eye visual fields normal and left eye visual fields normal.   Cardiovascular:      Rate and Rhythm: Normal rate and regular rhythm.      Heart sounds: Normal heart sounds.   Pulmonary:      Effort: Pulmonary effort is normal. No retractions.      Breath sounds: Normal breath sounds and air entry.      Comments: Inspection of chest: normal appearance  Abdominal:      General: Bowel sounds are " normal.      Palpations: Abdomen is soft.      Tenderness: There is no abdominal tenderness.      Comments: No appreciable hepatosplenomegaly   Musculoskeletal:      Cervical back: Neck supple.      Right lower leg: No edema.      Left lower leg: No edema.   Lymphadenopathy:      Cervical: No cervical adenopathy.   Skin:     Findings: No lesion.      Comments: Turgor normal   Neurological:      Mental Status: She is alert and oriented to person, place, and time.   Psychiatric:         Mood and Affect: Mood and affect normal.           Assessment & Plan          Assessment and Plan    Diagnoses and all orders for this visit:    1. 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 Fatty liver    3. Gastroesophageal reflux disease without esophagitis    4. JEFF (nonalcoholic steatohepatitis)    5. Cirrhosis of liver without ascites, unspecified hepatic cirrhosis type (HCC)    6. Thrombocytopenia (HCC)    7. Abnormal CT scan, esophagus    8. Hepatomegaly    9. Hepatic steatosis    10. Altered bowel habits    11. Diarrhea, unspecified type    Other orders  -     colestipol (Colestid) 1 g tablet; Take 2 tablets by mouth every night at bedtime for 30 days.  Dispense: 60 tablet; Refill: 2      62-year-old female presenting the office today for follow-up  with a history of reflux, diabetes, diarrhea, cholecystectomy, fatty liver, breast cancer and suspected compensated cirrhosis secondary to Jeff. .  This follow-up was to assess how Nexium was affecting her reflux.  Patient continues to have worsening reflux. I have recommended that the patient undergo further evaluation with an EGD.  I have discussed this procedure in detail with the patient.  I have discussed the risks, benefits and alternatives.  I have discussed the risk of anesthesia, bleeding and perforation.  Patient understands these risks, benefits and alternatives and wishes to proceed.  I will schedule her at her earliest convenience.  I have added  colestipol 2 g twice daily to see if this improves her diarrhea.  We did discuss that her medications could be the cause of her diarrhea including metformin and Mounjaro.  I have also considered SIBO and will plan to treat if no improvement with colestipol.  Patient agreeable to this plan will call with any questions or concerns.  We will need to repeat patient's ultrasound and labs for cirrhosis at follow-up appointment.            Follow Up       Follow Up   Return for Follow up after endoscopy in office.  Patient was given instructions and counseling regarding her condition or for health maintenance advice. Please see specific information pulled into the AVS if appropriate.

## 2023-03-02 ENCOUNTER — PREP FOR SURGERY (OUTPATIENT)
Dept: OTHER | Facility: HOSPITAL | Age: 63
End: 2023-03-02
Payer: COMMERCIAL

## 2023-03-02 ENCOUNTER — OFFICE VISIT (OUTPATIENT)
Dept: GASTROENTEROLOGY | Facility: CLINIC | Age: 63
End: 2023-03-02
Payer: COMMERCIAL

## 2023-03-02 VITALS
DIASTOLIC BLOOD PRESSURE: 60 MMHG | SYSTOLIC BLOOD PRESSURE: 149 MMHG | OXYGEN SATURATION: 98 % | HEIGHT: 63 IN | HEART RATE: 60 BPM | WEIGHT: 209.6 LBS | BODY MASS INDEX: 37.14 KG/M2

## 2023-03-02 DIAGNOSIS — K75.81 LIVER CIRRHOSIS SECONDARY TO NASH: ICD-10-CM

## 2023-03-02 DIAGNOSIS — K21.9 GASTROESOPHAGEAL REFLUX DISEASE WITHOUT ESOPHAGITIS: ICD-10-CM

## 2023-03-02 DIAGNOSIS — R93.3 ABNORMAL CT SCAN, ESOPHAGUS: ICD-10-CM

## 2023-03-02 DIAGNOSIS — R19.4 ALTERED BOWEL HABITS: ICD-10-CM

## 2023-03-02 DIAGNOSIS — D69.6 THROMBOCYTOPENIA: ICD-10-CM

## 2023-03-02 DIAGNOSIS — K74.60 LIVER CIRRHOSIS SECONDARY TO NASH: ICD-10-CM

## 2023-03-02 DIAGNOSIS — K75.81 LIVER CIRRHOSIS SECONDARY TO NASH: Primary | ICD-10-CM

## 2023-03-02 DIAGNOSIS — K74.60 CIRRHOSIS OF LIVER WITHOUT ASCITES, UNSPECIFIED HEPATIC CIRRHOSIS TYPE: ICD-10-CM

## 2023-03-02 DIAGNOSIS — K74.60 LIVER CIRRHOSIS SECONDARY TO NASH: Primary | ICD-10-CM

## 2023-03-02 DIAGNOSIS — K75.81 NASH (NONALCOHOLIC STEATOHEPATITIS): ICD-10-CM

## 2023-03-02 DIAGNOSIS — R16.0 HEPATOMEGALY: ICD-10-CM

## 2023-03-02 DIAGNOSIS — R19.7 DIARRHEA, UNSPECIFIED TYPE: ICD-10-CM

## 2023-03-02 DIAGNOSIS — K76.0 FATTY LIVER: ICD-10-CM

## 2023-03-02 DIAGNOSIS — K76.0 HEPATIC STEATOSIS: ICD-10-CM

## 2023-03-02 DIAGNOSIS — K21.9 GASTROESOPHAGEAL REFLUX DISEASE WITHOUT ESOPHAGITIS: Primary | ICD-10-CM

## 2023-03-02 PROCEDURE — 99214 OFFICE O/P EST MOD 30 MIN: CPT | Performed by: NURSE PRACTITIONER

## 2023-03-02 RX ORDER — DULOXETIN HYDROCHLORIDE 20 MG/1
20 CAPSULE, DELAYED RELEASE ORAL DAILY
COMMUNITY

## 2023-03-02 RX ORDER — MONTELUKAST SODIUM 4 MG/1
2 TABLET, CHEWABLE ORAL
Qty: 60 TABLET | Refills: 2 | Status: SHIPPED | OUTPATIENT
Start: 2023-03-02 | End: 2023-04-01

## 2023-03-02 NOTE — PATIENT INSTRUCTIONS
Diarrhea, Adult  Diarrhea is frequent loose and watery bowel movements. Diarrhea can make you feel weak and cause you to become dehydrated. Dehydration can make you tired and thirsty, cause you to have a dry mouth, and decrease how often you urinate.  Diarrhea typically lasts 2-3 days. However, it can last longer if it is a sign of something more serious. It is important to treat your diarrhea as told by your health care provider.  Follow these instructions at home:  Eating and drinking     Follow these recommendations as told by your health care provider:  Take an oral rehydration solution (ORS). This is an over-the-counter medicine that helps return your body to its normal balance of nutrients and water. It is found at pharmacies and retail stores.  Drink plenty of fluids, such as water, ice chips, diluted fruit juice, and low-calorie sports drinks. You can drink milk also, if desired.  Avoid drinking fluids that contain a lot of sugar or caffeine, such as energy drinks, sports drinks, and soda.  Eat bland, easy-to-digest foods in small amounts as you are able. These foods include bananas, applesauce, rice, lean meats, toast, and crackers.  Avoid alcohol.  Avoid spicy or fatty foods.    Medicines  Take over-the-counter and prescription medicines only as told by your health care provider.  If you were prescribed an antibiotic medicine, take it as told by your health care provider. Do not stop using the antibiotic even if you start to feel better.  General instructions    Wash your hands often using soap and water. If soap and water are not available, use a hand . Others in the household should wash their hands as well. Hands should be washed:  After using the toilet or changing a diaper.  Before preparing, cooking, or serving food.  While caring for a sick person or while visiting someone in a hospital.  Drink enough fluid to keep your urine pale yellow.  Rest at home while you recover.  Watch your  condition for any changes.  Take a warm bath to relieve any burning or pain from frequent diarrhea episodes.  Keep all follow-up visits as told by your health care provider. This is important.  Contact a health care provider if:  You have a fever.  Your diarrhea gets worse.  You have new symptoms.  You cannot keep fluids down.  You feel light-headed or dizzy.  You have a headache.  You have muscle cramps.  Get help right away if:  You have chest pain.  You feel extremely weak or you faint.  You have bloody or black stools or stools that look like tar.  You have severe pain, cramping, or bloating in your abdomen.  You have trouble breathing or you are breathing very quickly.  Your heart is beating very quickly.  Your skin feels cold and clammy.  You feel confused.  You have signs of dehydration, such as:  Dark urine, very little urine, or no urine.  Cracked lips.  Dry mouth.  Sunken eyes.  Sleepiness.  Weakness.  Summary  Diarrhea is frequent loose and sometimes watery bowel movements. Diarrhea can make you feel weak and cause you to become dehydrated.  Drink enough fluids to keep your urine pale yellow.  Make sure that you wash your hands after using the toilet. If soap and water are not available, use hand .  Contact a health care provider if your diarrhea gets worse or you have new symptoms.  Get help right away if you have signs of dehydration.  This information is not intended to replace advice given to you by your health care provider. Make sure you discuss any questions you have with your health care provider.  Document Revised: 06/29/2022 Document Reviewed: 06/29/2022  [employer] Patient Education © 2022 [employer]  Food Choices for Gastroesophageal Reflux Disease, Adult  When you have gastroesophageal reflux disease (GERD), the foods you eat and your eating habits are very important. Choosing the right foods can help ease your discomfort. Think about working with a food expert (dietitian) to help  you make good choices.  What are tips for following this plan?  Reading food labels  Look for foods that are low in saturated fat. Foods that may help with your symptoms include:  Foods that have less than 5% of daily value (DV) of fat.  Foods that have 0 grams of trans fat.  Cooking  Do not oh your food.  Cook your food by baking, steaming, grilling, or broiling. These are all methods that do not need a lot of fat for cooking.  To add flavor, try to use herbs that are low in spice and acidity.  Meal planning    Choose healthy foods that are low in fat, such as:  Fruits and vegetables.  Whole grains.  Low-fat dairy products.  Lean meats, fish, and poultry.  Eat small meals often instead of eating 3 large meals each day. Eat your meals slowly in a place where you are relaxed. Avoid bending over or lying down until 2-3 hours after eating.  Limit high-fat foods such as fatty meats or fried foods.  Limit your intake of fatty foods, such as oils, butter, and shortening.  Avoid the following as told by your doctor:  Foods that cause symptoms. These may be different for different people. Keep a food diary to keep track of foods that cause symptoms.  Alcohol.  Drinking a lot of liquid with meals.  Eating meals during the 2-3 hours before bed.  Lifestyle  Stay at a healthy weight. Ask your doctor what weight is healthy for you. If you need to lose weight, work with your doctor to do so safely.  Exercise for at least 30 minutes on 5 or more days each week, or as told by your doctor.  Wear loose-fitting clothes.  Do not smoke or use any products that contain nicotine or tobacco. If you need help quitting, ask your doctor.  Sleep with the head of your bed higher than your feet. Use a wedge under the mattress or blocks under the bed frame to raise the head of the bed.  Chew sugar-free gum after meals.  What foods should eat?  Eat a healthy, well-balanced diet of fruits, vegetables, whole grains, low-fat dairy products, lean  meats, fish, and poultry. Each person is different.  Foods that may cause symptoms in one person may not cause any symptoms in another person. Work with your doctor to find foods that are safe for you.  The items listed above may not be a complete list of what you can eat and drink. Contact a food expert for more options.  What foods should I avoid?  Limiting some of these foods may help in managing the symptoms of GERD. Everyone is different. Talk with a food expert or your doctor to help you find the exact foods to avoid, if any.  Fruits  Any fruits prepared with added fat. Any fruits that cause symptoms. For some people, this may include citrus fruits, such as oranges, grapefruit, pineapple, and bennett.  Vegetables  Deep-fried vegetables. French fries. Any vegetables prepared with added fat. Any vegetables that cause symptoms. For some people, this may include tomatoes and tomato products, chili peppers, onions and garlic, and horseradish.  Grains  Pastries or quick breads with added fat.  Meats and other proteins  High-fat meats, such as fatty beef or pork, hot dogs, ribs, ham, sausage, salami, and dominique. Fried meat or protein, including fried fish and fried chicken. Nuts and nut butters, in large amounts.  Dairy  Whole milk and chocolate milk. Sour cream. Cream. Ice cream. Cream cheese. Milkshakes.  Fats and oils  Butter. Margarine. Shortening. Ghee.  Beverages  Coffee and tea, with or without caffeine. Carbonated beverages. Sodas. Energy drinks. Fruit juice made with acidic fruits, such as orange or grapefruit. Tomato juice. Alcoholic drinks.  Sweets and desserts  Chocolate and cocoa. Donuts.  Seasonings and condiments  Pepper. Peppermint and spearmint. Added salt. Any condiments, herbs, or seasonings that cause symptoms. For some people, this may include david, hot sauce, or vinegar-based salad dressings.  The items listed above may not be a complete list of what you should not eat and drink. Contact a food  expert for more options.  Questions to ask your doctor  Diet and lifestyle changes are often the first steps that are taken to manage symptoms of GERD. If diet and lifestyle changes do not help, talk with your doctor about taking medicines.  Where to find more information  [employer]: aboutgerd.org  Summary  When you have GERD, food and lifestyle choices are very important in easing your symptoms.  Eat small meals often instead of 3 large meals a day. Eat your meals slowly and in a place where you are relaxed.  Avoid bending over or lying down until 2-3 hours after eating.  Limit high-fat foods such as fatty meats or fried foods.  This information is not intended to replace advice given to you by your health care provider. Make sure you discuss any questions you have with your health care provider.  Document Revised: 06/28/2021 Document Reviewed: 06/28/2021  [employer] Patient Education © 2022 [employer]

## 2023-04-28 ENCOUNTER — HOSPITAL ENCOUNTER (OUTPATIENT)
Facility: HOSPITAL | Age: 63
Setting detail: HOSPITAL OUTPATIENT SURGERY
Discharge: HOME | End: 2023-04-28
Attending: INTERNAL MEDICINE | Admitting: INTERNAL MEDICINE
Payer: COMMERCIAL

## 2023-04-28 ENCOUNTER — ANESTHESIA (OUTPATIENT)
Dept: GASTROENTEROLOGY | Facility: HOSPITAL | Age: 63
End: 2023-04-28
Payer: COMMERCIAL

## 2023-04-28 ENCOUNTER — ANESTHESIA EVENT (OUTPATIENT)
Dept: GASTROENTEROLOGY | Facility: HOSPITAL | Age: 63
End: 2023-04-28
Payer: COMMERCIAL

## 2023-04-28 VITALS
HEIGHT: 64 IN | RESPIRATION RATE: 16 BRPM | BODY MASS INDEX: 34.63 KG/M2 | TEMPERATURE: 97.6 F | WEIGHT: 202.82 LBS | DIASTOLIC BLOOD PRESSURE: 48 MMHG | SYSTOLIC BLOOD PRESSURE: 114 MMHG | HEART RATE: 57 BPM | OXYGEN SATURATION: 91 %

## 2023-04-28 DIAGNOSIS — K74.60 LIVER CIRRHOSIS SECONDARY TO NASH: ICD-10-CM

## 2023-04-28 DIAGNOSIS — K21.9 GASTROESOPHAGEAL REFLUX DISEASE WITHOUT ESOPHAGITIS: ICD-10-CM

## 2023-04-28 DIAGNOSIS — R93.3 ABNORMAL CT SCAN, ESOPHAGUS: ICD-10-CM

## 2023-04-28 DIAGNOSIS — K75.81 LIVER CIRRHOSIS SECONDARY TO NASH: ICD-10-CM

## 2023-04-28 PROCEDURE — 43239 EGD BIOPSY SINGLE/MULTIPLE: CPT | Performed by: INTERNAL MEDICINE

## 2023-04-28 PROCEDURE — 88305 TISSUE EXAM BY PATHOLOGIST: CPT | Performed by: INTERNAL MEDICINE

## 2023-04-28 PROCEDURE — 25010000002 PROPOFOL 10 MG/ML EMULSION: Performed by: NURSE ANESTHETIST, CERTIFIED REGISTERED

## 2023-04-28 RX ORDER — LIDOCAINE HYDROCHLORIDE 20 MG/ML
INJECTION, SOLUTION EPIDURAL; INFILTRATION; INTRACAUDAL; PERINEURAL AS NEEDED
Status: DISCONTINUED | OUTPATIENT
Start: 2023-04-28 | End: 2023-04-28 | Stop reason: SURG

## 2023-04-28 RX ORDER — SODIUM CHLORIDE, SODIUM LACTATE, POTASSIUM CHLORIDE, CALCIUM CHLORIDE 600; 310; 30; 20 MG/100ML; MG/100ML; MG/100ML; MG/100ML
30 INJECTION, SOLUTION INTRAVENOUS CONTINUOUS
Status: DISCONTINUED | OUTPATIENT
Start: 2023-04-28 | End: 2023-04-28 | Stop reason: HOSPADM

## 2023-04-28 RX ADMIN — PROPOFOL 250 MCG/KG/MIN: 10 INJECTION, EMULSION INTRAVENOUS at 08:08

## 2023-04-28 RX ADMIN — SODIUM CHLORIDE, POTASSIUM CHLORIDE, SODIUM LACTATE AND CALCIUM CHLORIDE: 600; 310; 30; 20 INJECTION, SOLUTION INTRAVENOUS at 08:08

## 2023-04-28 RX ADMIN — LIDOCAINE HYDROCHLORIDE 40 MG: 20 INJECTION, SOLUTION EPIDURAL; INFILTRATION; INTRACAUDAL; PERINEURAL at 08:08

## 2023-04-28 NOTE — ANESTHESIA PREPROCEDURE EVALUATION
Anesthesia Evaluation     Patient summary reviewed and Nursing notes reviewed   no history of anesthetic complications:  NPO Solid Status: > 8 hours  NPO Liquid Status: > 2 hours           Airway   Mallampati: III  TM distance: >3 FB  Neck ROM: full  No difficulty expected  Dental    (+) edentulous    Pulmonary - normal exam    breath sounds clear to auscultation  (+) a smoker Former,   Cardiovascular - normal exam  Exercise tolerance: poor (<4 METS)    Patient on routine beta blocker and Beta blocker given within 24 hours of surgery  Rhythm: regular  Rate: normal    (+) hypertension, hyperlipidemia,  carotid artery disease      Neuro/Psych  (+) headaches, numbness,    GI/Hepatic/Renal/Endo    (+)  GERD,  liver disease fatty liver disease, diabetes mellitus type 2,     Musculoskeletal     (+) neck pain,   Abdominal    Substance History - negative use     OB/GYN negative ob/gyn ROS         Other   arthritis,    history of cancer (Breast)    ROS/Med Hx Other: PAT Nursing Notes unavailable.                   Anesthesia Plan    ASA 3     general and MAC     (Patient understands anesthesia not responsible for dental damage.)  intravenous induction     Anesthetic plan, risks, benefits, and alternatives have been provided, discussed and informed consent has been obtained with: patient.    Use of blood products discussed with patient .   Plan discussed with CRNA.        CODE STATUS:

## 2023-04-28 NOTE — ANESTHESIA POSTPROCEDURE EVALUATION
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        Post Anesthesia Care and Evaluation    Patient location during evaluation: bedside  Patient participation: complete - patient participated  Level of consciousness: awake  Pain management: adequate    Airway patency: patent  PONV Status: none  Cardiovascular status: acceptable and stable  Respiratory status: acceptable  Hydration status: acceptable    Comments: An Anesthesiologist personally participated in the most demanding procedures (including induction and emergence if applicable) in the anesthesia plan, monitored the course of anesthesia administration at frequent intervals and remained physically present and available for immediate diagnosis and treatment of emergencies.

## 2023-04-28 NOTE — H&P
Pre Procedure History & Physical    Chief Complaint:   Cirrhosis  gerd    Subjective     HPI:   As above    Past Medical History:   Past Medical History:   Diagnosis Date   • Breast cancer     LEFT BREAST   • Carotid artery calcification     pt is going to have endarectomy 8/24/21   • Diabetes mellitus, type II    • GERD (gastroesophageal reflux disease)    • High cholesterol    • Hypertension        Past Surgical History:  Past Surgical History:   Procedure Laterality Date   • BACK SURGERY     • BREAST BIOPSY     • BREAST LUMPECTOMY      left   • CAROTID ENDARTERECTOMY Left    • COLONOSCOPY  05/2021    luis alberto   • ENDOSCOPY N/A 01/31/2022    Procedure: ESOPHAGOGASTRODUODENOSCOPY WITH BX;  Surgeon: Star Fischer MD;  Location: [facility] ENDOSCOPY;  Service: Gastroenterology;  Laterality: N/A;  NORMAL EGD   • SPINE SURGERY     • TUBAL ABDOMINAL LIGATION     • UPPER GASTROINTESTINAL ENDOSCOPY         Family History:  Family History   Problem Relation Age of Onset   • Lung cancer Father    • Cancer Father    • Cancer Paternal Uncle    • Colon cancer Neg Hx    • Malig Hyperthermia Neg Hx        Social History:   reports that she quit smoking about 6 years ago. Her smoking use included cigarettes. She has a 30.00 pack-year smoking history. She has never been exposed to tobacco smoke. She has never used smokeless tobacco. She reports that she does not drink alcohol and does not use drugs.    Medications:   Medications Prior to Admission   Medication Sig Dispense Refill Last Dose   • amLODIPine (NORVASC) 10 MG tablet Take 1 tablet by mouth Daily.      • aspirin 81 MG EC tablet Take 1 tablet by mouth Daily. Last dose Friday 1/28      • BD Pen Needle Awilda U/F 32G X 4 MM misc INJECT using 1 FOUR TIMES DAILY before meals and nightly      • Calcium Carbonate-Vitamin D3 600-400 MG-UNIT tablet Take 2 tablets by mouth 2 (Two) Times a Day.      • carvedilol (COREG) 6.25 MG tablet Take 1 tablet by mouth 2 (Two) Times a Day  "With Meals.      • colestipol (Colestid) 1 g tablet Take 2 tablets by mouth every night at bedtime for 30 days. 60 tablet 2    • Continuous Blood Gluc  (Dexcom G6 ) device See Admin Instructions.      • Continuous Blood Gluc Sensor (Dexcom G6 Sensor) apply 1 topically EVERY 10 days      • Continuous Blood Gluc Transmit (Dexcom G6 Transmitter) misc See Admin Instructions.      • DULoxetine (CYMBALTA) 20 MG capsule Take 1 capsule by mouth Daily.      • esomeprazole (nexIUM) 40 MG capsule Take 1 capsule by mouth 2 (Two) Times a Day. 60 capsule 5    • gabapentin (NEURONTIN) 300 MG capsule Take 2 capsules by mouth 2 (Two) Times a Day.      • glipizide (GLUCOTROL) 10 MG tablet Take 1 tablet by mouth 2 (Two) Times a Day Before Meals.      • HYDROcodone-acetaminophen (NORCO) 5-325 MG per tablet hydrocodone 5 mg-acetaminophen 325 mg tablet   TAKE 1 TABLET BY MOUTH EVERY 8 TO 12 HOURS AS NEEDED FOR SEVERE PAIN. FILL DATE 05/12/2022      • insulin degludec (Tresiba FlexTouch) 100 UNIT/ML solution pen-injector injection Inject 100 Units under the skin into the appropriate area as directed Daily With Dinner.      • Insulin Lispro, 0.5 Unit Dial, (HumaLOG Drew KwikPen) 100 UNIT/ML solution pen-injector Humalog Drew KwikPen (U-100) 100 unit/mL subcutaneous half-unit pen   INJECT 6 UNITS TWICE DAILY before lunch and at dinner.      • Insulin Syringe-Needle U-100 (BD Veo Insulin Syringe U/F) 31G X 15/64\" 1 ML misc USE TO INJECT INTO THE SKIN AS NEEDED      • lisinopril-hydrochlorothiazide (PRINZIDE,ZESTORETIC) 20-25 MG per tablet Take 1 tablet by mouth 2 (Two) Times a Day.      • metFORMIN (GLUCOPHAGE) 1000 MG tablet Take 1 tablet by mouth Every 12 (Twelve) Hours. Hold Sunday pm and Monday am      • pravastatin (PRAVACHOL) 80 MG tablet Take 1 tablet by mouth Every Night.      • Tirzepatide (Mounjaro) 2.5 MG/0.5ML solution pen-injector Mounjaro 2.5 mg/0.5 mL subcutaneous pen injector   inject 0.5ml SUBCUTANEOUSLY " "EVERY 7 DAYS      • Toujeo Max SoloStar 300 UNIT/ML solution pen-injector injection INJECT 130 UNITS SUBCUTANEOUSLY ONCE DAILY      • Tresiba 100 UNIT/ML solution injection Inject 100 Units under the skin into the appropriate area as directed Daily With Dinner.          Allergies:  Patient has no known allergies.        Objective     Blood pressure 118/48, pulse 64, temperature 97.5 °F (36.4 °C), temperature source Temporal, resp. rate 20, height 162.6 cm (64\"), weight 92 kg (202 lb 13.2 oz), SpO2 94 %, not currently breastfeeding.    Physical Exam   Constitutional: Pt is oriented to person, place, and time and well-developed, well-nourished, and in no distress.   Mouth/Throat: Oropharynx is clear and moist.   Neck: Normal range of motion.   Cardiovascular: Normal rate, regular rhythm and normal heart sounds.    Pulmonary/Chest: Effort normal and breath sounds normal.   Abdominal: Soft. Nontender  Skin: Skin is warm and dry.   Psychiatric: Mood, memory, affect and judgment normal.     Assessment & Plan     Diagnosis:  Cirrhosis  gerd    Anticipated Surgical Procedure:  egd    The risks, benefits, and alternatives of this procedure have been discussed with the patient or the responsible party- the patient understands and agrees to proceed.            "

## 2023-05-01 ENCOUNTER — TELEPHONE (OUTPATIENT)
Dept: GASTROENTEROLOGY | Facility: CLINIC | Age: 63
End: 2023-05-01
Payer: COMMERCIAL

## 2023-05-01 NOTE — TELEPHONE ENCOUNTER
----- Message from ALEXIS Ramirez sent at 5/1/2023  1:19 PM EDT -----  Gastric mucosa with intestinal metaplasia.  Repeat EGD in 1 year for surveillance.  Negative for H. pylori, dysplasia or malignancy.  Have patient continue Nexium as prescribed.

## 2023-05-01 NOTE — TELEPHONE ENCOUNTER
Spoke to pt and informed of Sarah ESPINOZA result note and recommendations. Pt verified understanding.     1 year EGD recall placed.

## 2023-05-04 ENCOUNTER — HOSPITAL ENCOUNTER (OUTPATIENT)
Dept: BONE DENSITY | Facility: HOSPITAL | Age: 63
Discharge: HOME | End: 2023-05-04
Admitting: NURSE PRACTITIONER
Payer: COMMERCIAL

## 2023-05-04 PROCEDURE — 77080 DXA BONE DENSITY AXIAL: CPT

## 2023-06-01 ENCOUNTER — TELEPHONE (OUTPATIENT)
Dept: GASTROENTEROLOGY | Facility: CLINIC | Age: 63
End: 2023-06-01

## 2023-06-01 NOTE — TELEPHONE ENCOUNTER
Nilam with [facility] [phone] called in regards to Ms Phyllis Prescriptions and request guidance on which medication patient should be filling.   She states she has an active prescription for Nexium from Sarah Ramsey and an active script from PCP Ольга Bhagat for Protonix.  Routing to Clinical pool for review

## 2023-06-01 NOTE — TELEPHONE ENCOUNTER
Per EGD pathology 04/2023, continue Nexium.      I called and spoke with Nilam with pharmacy. Notified of this.    Last PCP OV note was 02/2023, pcp mentioned she would refill nexium then.    seema

## 2023-08-18 ENCOUNTER — TRANSCRIBE ORDERS (OUTPATIENT)
Dept: ADMINISTRATIVE | Facility: HOSPITAL | Age: 63
End: 2023-08-18
Payer: MEDICARE

## 2023-08-18 DIAGNOSIS — I77.9 DISEASE OF ARTERY: Primary | ICD-10-CM

## 2023-08-18 DIAGNOSIS — I65.23 CAROTID STENOSIS, BILATERAL: ICD-10-CM

## 2023-08-18 DIAGNOSIS — I10 ESSENTIAL HYPERTENSION, MALIGNANT: ICD-10-CM

## 2023-08-28 ENCOUNTER — HOSPITAL ENCOUNTER (OUTPATIENT)
Dept: MAMMOGRAPHY | Facility: HOSPITAL | Age: 63
Discharge: HOME | End: 2023-08-28
Admitting: NURSE PRACTITIONER
Payer: MEDICARE

## 2023-08-28 DIAGNOSIS — Z12.31 ENCOUNTER FOR SCREENING MAMMOGRAM FOR BREAST CANCER: ICD-10-CM

## 2023-08-28 PROCEDURE — 77063 BREAST TOMOSYNTHESIS BI: CPT

## 2023-08-28 PROCEDURE — 77067 SCR MAMMO BI INCL CAD: CPT

## 2023-09-12 ENCOUNTER — HOSPITAL ENCOUNTER (OUTPATIENT)
Dept: CARDIOLOGY | Facility: HOSPITAL | Age: 63
Discharge: HOME | End: 2023-09-12
Admitting: SURGERY
Payer: MEDICARE

## 2023-09-12 DIAGNOSIS — I65.23 CAROTID STENOSIS, BILATERAL: ICD-10-CM

## 2023-09-12 LAB
BH CV XLRA MEAS LEFT CAROTID BULB EDV: 21 CM/SEC
BH CV XLRA MEAS LEFT CAROTID BULB PSV: 101 CM/SEC
BH CV XLRA MEAS LEFT DIST CCA EDV: 16.5 CM/SEC
BH CV XLRA MEAS LEFT DIST CCA PSV: 82.9 CM/SEC
BH CV XLRA MEAS LEFT DIST ICA EDV: -22.4 CM/SEC
BH CV XLRA MEAS LEFT DIST ICA PSV: -82.6 CM/SEC
BH CV XLRA MEAS LEFT ICA/CCA RATIO: -2.6
BH CV XLRA MEAS LEFT MID ICA EDV: -25.9 CM/SEC
BH CV XLRA MEAS LEFT MID ICA PSV: -108.2 CM/SEC
BH CV XLRA MEAS LEFT PROX CCA EDV: 19.2 CM/SEC
BH CV XLRA MEAS LEFT PROX CCA PSV: 79 CM/SEC
BH CV XLRA MEAS LEFT PROX ECA EDV: -23.5 CM/SEC
BH CV XLRA MEAS LEFT PROX ECA PSV: -141.1 CM/SEC
BH CV XLRA MEAS LEFT PROX ICA EDV: -83.7 CM/SEC
BH CV XLRA MEAS LEFT PROX ICA PSV: -212.6 CM/SEC
BH CV XLRA MEAS LEFT VERTEBRAL A EDV: 11 CM/SEC
BH CV XLRA MEAS LEFT VERTEBRAL A PSV: 44.3 CM/SEC
BH CV XLRA MEAS RIGHT CAROTID BULB EDV: 21.1 CM/SEC
BH CV XLRA MEAS RIGHT CAROTID BULB PSV: 91.3 CM/SEC
BH CV XLRA MEAS RIGHT DIST CCA EDV: -14.3 CM/SEC
BH CV XLRA MEAS RIGHT DIST CCA PSV: -88.8 CM/SEC
BH CV XLRA MEAS RIGHT DIST ICA EDV: -21.1 CM/SEC
BH CV XLRA MEAS RIGHT DIST ICA PSV: -84.2 CM/SEC
BH CV XLRA MEAS RIGHT ICA/CCA RATIO: 1.02
BH CV XLRA MEAS RIGHT MID ICA EDV: 21 CM/SEC
BH CV XLRA MEAS RIGHT MID ICA PSV: 78.5 CM/SEC
BH CV XLRA MEAS RIGHT PROX CCA EDV: -14.9 CM/SEC
BH CV XLRA MEAS RIGHT PROX CCA PSV: -77.7 CM/SEC
BH CV XLRA MEAS RIGHT PROX ECA EDV: -17.6 CM/SEC
BH CV XLRA MEAS RIGHT PROX ECA PSV: -144.9 CM/SEC
BH CV XLRA MEAS RIGHT PROX ICA EDV: -17.1 CM/SEC
BH CV XLRA MEAS RIGHT PROX ICA PSV: -90.5 CM/SEC
BH CV XLRA MEAS RIGHT VERTEBRAL A EDV: -13.7 CM/SEC
BH CV XLRA MEAS RIGHT VERTEBRAL A PSV: -51.6 CM/SEC
LEFT ARM BP: NORMAL MMHG
RIGHT ARM BP: NORMAL MMHG

## 2023-09-12 PROCEDURE — 93880 EXTRACRANIAL BILAT STUDY: CPT

## 2023-12-04 ENCOUNTER — TELEPHONE (OUTPATIENT)
Dept: GASTROENTEROLOGY | Facility: CLINIC | Age: 63
End: 2023-12-04
Payer: MEDICARE

## 2023-12-04 NOTE — TELEPHONE ENCOUNTER
CALLED PATIENT TO INQUIRE ABOUT ULTRASOUND.    PT STATES SHE HAD TO CX DUE TO TESTING POSITIVE FOR COVID. SHE WOULD LIKE ME TO CALL HER BACK IN ABOUT A WEEK TO RESCHED.

## 2023-12-15 ENCOUNTER — HOSPITAL ENCOUNTER (OUTPATIENT)
Dept: ULTRASOUND IMAGING | Facility: HOSPITAL | Age: 63
Discharge: HOME | End: 2023-12-15
Admitting: NURSE PRACTITIONER
Payer: MEDICARE

## 2023-12-15 DIAGNOSIS — K75.81 NASH (NONALCOHOLIC STEATOHEPATITIS): ICD-10-CM

## 2023-12-15 DIAGNOSIS — D69.6 THROMBOCYTOPENIA: ICD-10-CM

## 2023-12-15 DIAGNOSIS — K58.0 IRRITABLE BOWEL SYNDROME WITH DIARRHEA: ICD-10-CM

## 2023-12-15 DIAGNOSIS — K74.60 LIVER CIRRHOSIS SECONDARY TO NASH: ICD-10-CM

## 2023-12-15 DIAGNOSIS — R19.4 ALTERED BOWEL HABITS: ICD-10-CM

## 2023-12-15 DIAGNOSIS — K21.9 GASTROESOPHAGEAL REFLUX DISEASE WITHOUT ESOPHAGITIS: ICD-10-CM

## 2023-12-15 DIAGNOSIS — R19.7 DIARRHEA, UNSPECIFIED TYPE: ICD-10-CM

## 2023-12-15 DIAGNOSIS — K74.60 CIRRHOSIS OF LIVER WITHOUT ASCITES, UNSPECIFIED HEPATIC CIRRHOSIS TYPE: ICD-10-CM

## 2023-12-15 DIAGNOSIS — R16.0 HEPATOMEGALY: ICD-10-CM

## 2023-12-15 DIAGNOSIS — K75.81 LIVER CIRRHOSIS SECONDARY TO NASH: ICD-10-CM

## 2023-12-15 DIAGNOSIS — K76.0 FATTY LIVER: ICD-10-CM

## 2023-12-15 DIAGNOSIS — K76.0 HEPATIC STEATOSIS: ICD-10-CM

## 2023-12-15 PROCEDURE — 76705 ECHO EXAM OF ABDOMEN: CPT

## 2023-12-28 ENCOUNTER — OFFICE VISIT (OUTPATIENT)
Dept: GASTROENTEROLOGY | Facility: CLINIC | Age: 63
End: 2023-12-28
Payer: MEDICARE

## 2023-12-28 VITALS
WEIGHT: 180 LBS | BODY MASS INDEX: 30.73 KG/M2 | DIASTOLIC BLOOD PRESSURE: 48 MMHG | OXYGEN SATURATION: 98 % | HEART RATE: 64 BPM | SYSTOLIC BLOOD PRESSURE: 95 MMHG | HEIGHT: 64 IN

## 2023-12-28 DIAGNOSIS — K75.81 LIVER CIRRHOSIS SECONDARY TO NASH: ICD-10-CM

## 2023-12-28 DIAGNOSIS — K21.9 GASTROESOPHAGEAL REFLUX DISEASE, UNSPECIFIED WHETHER ESOPHAGITIS PRESENT: ICD-10-CM

## 2023-12-28 DIAGNOSIS — K58.0 IRRITABLE BOWEL SYNDROME WITH DIARRHEA: ICD-10-CM

## 2023-12-28 DIAGNOSIS — K21.9 GASTROESOPHAGEAL REFLUX DISEASE WITHOUT ESOPHAGITIS: Primary | ICD-10-CM

## 2023-12-28 DIAGNOSIS — K75.81 NASH (NONALCOHOLIC STEATOHEPATITIS): ICD-10-CM

## 2023-12-28 DIAGNOSIS — K74.60 LIVER CIRRHOSIS SECONDARY TO NASH: ICD-10-CM

## 2023-12-28 DIAGNOSIS — D69.6 THROMBOCYTOPENIA: ICD-10-CM

## 2023-12-28 DIAGNOSIS — R16.0 HEPATOMEGALY: ICD-10-CM

## 2023-12-28 DIAGNOSIS — R19.7 DIARRHEA, UNSPECIFIED TYPE: ICD-10-CM

## 2023-12-28 NOTE — PROGRESS NOTES
Chief Complaint   Follow-up    History of Present Illness       Airam Pool is a 63 y.o. 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  Patient will be due for EGD this April for follow-up on intestinal metaplasia noted on previous biopsies.  Patient continues on colestipol 1 to 2 g/day titrating as this works well for her diarrhea.  She continues Nexium 40 mg twice daily for continued reflux and continues lifestyle changes related to reflux.  She is on Mounjaro which has controlled her diabetes very well with a current A1c of 5.6 compared to 11 previously she has also lost 38 pounds while on this medication.  We had previously discussed Mounjaro being a possible cause of her worsening reflux and delayed gastric emptying but patient wishes to continue this medication.  We have reviewed recent ultrasound which displays fatty liver and portal hypertension.  Patient denies abdominal swelling, leg swelling, confusion, easily bleeding, fever, nausea, vomiting, weight loss, night sweats, melena, hematochezia, hematemesis.    EGD: Review of the patient's most recent EGD performed by Dr. Fischer on 04/28/23 medium size food in the stomach.  Intestinal metaplasia noted on biopsy of the stomach.  Repeat 1 year.     Endoscopy: Review of the patient's most recent EGD and colonoscopy performed by Dr. Fischer on 5/27/2021 deformity of the IC valve unable to intubate terminal ileum possible Crohn's.  Single polyp removed from the ascending colon normal esophagus stomach and duodenum.  Pathology reveals tubular adenoma.  Repeat colonoscopy 2026.     CT scan of the abdomen and pelvis with contrast 5/27/2021 normal 6/8/2021 revealed hepatic steatosis, splenomegaly, gastroesophageal varices consistent with  portal hypertension     Prometheus testing negative on 6/21/21     Fibroscan preformed on 05.27.2022 S3 F3      US abdomen limited on 12.15.2023  1. Increased echogenicity of the liver parenchyma likely related to underlying hepatic steatosis.    No evidence of focal liver lesion.  2. Dilated main portal vein and splenomegaly likely related to portal hypertension.  3. Dilated common bile duct measuring up to 12 mm.  This can be seen with physiologic changes after   cholecystectomy.  This is stable from prior examination.     Most recent labs on 06.28.2023 and 08.17.2023    Results       Result Review :   The following data was reviewed by: ALEXIS Ramirez on 12/28/2023:    CMP          1/3/2023    11:36 6/21/2023    12:22   CMP   Glucose  116    Glucose 119        BUN 20     22    Creatinine 1.03     1.19    EGFR  Am >60        EGFR  52.1    Sodium 141     141    Potassium 4.9     4.5    Chloride 106     103    Calcium 9.9     9.9    Total Protein 7.3     6.9    Albumin 4.5     4.6    Globulin 2.8     2.3    Total Bilirubin 0.7     0.4    Alkaline Phosphatase 85     65    AST (SGOT) 27     23    ALT (SGPT) 34     18    Albumin/Globulin Ratio  2.0    BUN/Creatinine Ratio 19.4     18.5    Anion Gap 7     11.6       Details          This result is from an external source.             CBC          1/3/2023    11:36 6/21/2023    12:22   CBC   WBC 5.86     4.17    RBC 4.18     3.97    Hemoglobin 12.2     12.5    Hematocrit 39.6     37.1    MCV 94.7     93.5    MCH 29.2     31.5    MCHC 30.8     33.7    RDW 14.6     13.3    Platelets 117     99       Details          This result is from an external source.               Liver Workup   A-1 Antitrypsin   Date Value Ref Range Status   01/27/2022 132 101 - 187 mg/dL Final     dsDNA   Date Value Ref Range Status   01/27/2022 Negative Negative Final     Expanded JENNIFER Screen   Date Value Ref Range Status   01/27/2022 Negative Negative Final     Smooth Muscle Ab   Date  Value Ref Range Status   01/27/2022 10 0 - 19 Units Final     Comment:                      Negative                     0 - 19                   Weak positive               20 - 30                   Moderate to strong positive     >30   Actin Antibodies are found in 52-85% of patients with   autoimmune hepatitis or chronic active hepatitis and   in 22% of patients with primary biliary cirrhosis.     Ceruloplasmin   Date Value Ref Range Status   01/27/2022 26 19 - 39 mg/dL Final     Ferritin   Date Value Ref Range Status   01/27/2022 331.00 (H) 13.00 - 150.00 ng/mL Final     Immunofixation Result, Serum   Date Value Ref Range Status   03/12/2021 COMMENT NA Final     Comment:     No monoclonality detected.     IgA   Date Value Ref Range Status   03/12/2021 173 87 - 352 mg/dL Final   03/12/2021 173 87 - 352 mg/dL Final     Iron   Date Value Ref Range Status   01/27/2022 42 37 - 145 mcg/dL Final     TIBC   Date Value Ref Range Status   01/27/2022 371 298 - 536 mcg/dL Final     Iron Saturation (TSAT)   Date Value Ref Range Status   01/27/2022 11 (L) 20 - 50 % Final     Transferrin   Date Value Ref Range Status   01/27/2022 249 200 - 360 mg/dL Final     Mitochondrial Ab   Date Value Ref Range Status   01/27/2022 <20.0 0.0 - 20.0 Units Final     Comment:                                     Negative    0.0 - 20.0                                  Equivocal  20.1 - 24.9                                  Positive         >24.9  Mitochondrial (M2) Antibodies are found in 90-96% of  patients with primary biliary cirrhosis.     Protime   Date Value Ref Range Status   06/21/2023 13.6 11.8 - 14.9 Seconds Final     INR   Date Value Ref Range Status   06/21/2023 1.03 0.86 - 1.15 Final     ALPHA-FETOPROTEIN   Date Value Ref Range Status   06/21/2023 <2 0 - 8.3 ng/mL Final               Past Medical History       Past Medical History:   Diagnosis Date    Breast cancer     LEFT BREAST    Carotid artery calcification     pt is going to  have endarectomy 8/24/21    Coronary artery disease 2020    Diabetes mellitus, type II     Fatty liver     GERD (gastroesophageal reflux disease)     Hernia Dont know    High cholesterol     Hypertension     Irritable bowel syndrome     Liver disease        Past Surgical History:   Procedure Laterality Date    BACK SURGERY      BREAST BIOPSY      BREAST LUMPECTOMY      left    CAROTID ENDARTERECTOMY Left     CHOLECYSTECTOMY      COLONOSCOPY  05/2021    luis alberto    ENDOSCOPY N/A 01/31/2022    Procedure: ESOPHAGOGASTRODUODENOSCOPY WITH BX;  Surgeon: Star Fischer MD;  Location: [facility];  Service: Gastroenterology;  Laterality: N/A;  NORMAL EGD    ENDOSCOPY N/A 04/28/2023    Procedure: ESOPHAGOGASTRODUODENOSCOPY WITH BIOPSY;  Surgeon: Star Fischer MD;  Location: [facility] ENDOSCOPY;  Service: Gastroenterology;  Laterality: N/A;  RETAINED FOOD    SPINE SURGERY      TUBAL ABDOMINAL LIGATION      UPPER GASTROINTESTINAL ENDOSCOPY           Current Outpatient Medications:     amLODIPine (NORVASC) 10 MG tablet, Take 1 tablet by mouth Daily., Disp: , Rfl:     aspirin 81 MG EC tablet, Take 1 tablet by mouth Daily. Last dose Friday 1/28, Disp: , Rfl:     BD Pen Needle Awilda U/F 32G X 4 MM misc, INJECT using 1 FOUR TIMES DAILY before meals and nightly, Disp: , Rfl:     carvedilol (COREG) 6.25 MG tablet, Take 1 tablet by mouth 2 (Two) Times a Day With Meals., Disp: , Rfl:     colestipol (COLESTID) 1 g tablet, Take 2 tablets by mouth every night at bedtime., Disp: , Rfl:     Continuous Blood Gluc Sensor (Dexcom G6 Sensor), apply 1 topically EVERY 10 days, Disp: , Rfl:     Continuous Blood Gluc Transmit (Dexcom G6 Transmitter) misc, See Admin Instructions., Disp: , Rfl:     DULoxetine (CYMBALTA) 20 MG capsule, Take 1 capsule by mouth Daily., Disp: , Rfl:     esomeprazole (nexIUM) 40 MG capsule, Take 1 capsule by mouth 2 (Two) Times a Day., Disp: 60 capsule, Rfl: 5    gabapentin (NEURONTIN) 300 MG capsule,  "Take 2 capsules by mouth 3 (Three) Times a Day., Disp: , Rfl:     hydroCHLOROthiazide (HYDRODIURIL) 25 MG tablet, Take 1 tablet by mouth Daily., Disp: , Rfl:     HYDROcodone-acetaminophen (NORCO) 5-325 MG per tablet, hydrocodone 5 mg-acetaminophen 325 mg tablet  TAKE 1 TABLET BY MOUTH EVERY 8 TO 12 HOURS AS NEEDED FOR SEVERE PAIN. FILL DATE 05/12/2022, Disp: , Rfl:     Insulin Glargine, 2 Unit Dial, (Toujeo Max SoloStar) 300 UNIT/ML solution pen-injector injection, Inject 30 Units under the skin into the appropriate area as directed Daily., Disp: , Rfl:     Insulin Lispro, 1 Unit Dial, (HUMALOG) 100 UNIT/ML solution pen-injector, Inject 10 units twice daily with meals plus sliding scale., Disp: , Rfl:     Insulin Syringe-Needle U-100 (BD Veo Insulin Syringe U/F) 31G X 15/64\" 1 ML misc, USE TO INJECT INTO THE SKIN AS NEEDED, Disp: , Rfl:     lisinopril (PRINIVIL,ZESTRIL) 40 MG tablet, Take 1 tablet by mouth Daily., Disp: , Rfl:     metFORMIN (GLUCOPHAGE) 1000 MG tablet, Take 1 tablet twice a day by oral route., Disp: , Rfl:     Mounjaro 10 MG/0.5ML solution pen-injector, inject 0.5 mls SUBCUTANEOUSLY every 7 days, Disp: , Rfl:     pravastatin (PRAVACHOL) 80 MG tablet, Take 1 tablet by mouth Daily., Disp: , Rfl:     Toujeo Max SoloStar 300 UNIT/ML solution pen-injector injection, INJECT 130 UNITS SUBCUTANEOUSLY ONCE DAILY, Disp: , Rfl:     colestipol (Colestid) 1 g tablet, Take 2 tablets by mouth every night at bedtime for 30 days., Disp: 60 tablet, Rfl: 2    meloxicam (MOBIC) 15 MG tablet, Take 1 tablet every day by oral route for 30 days. (Patient not taking: Reported on 12/28/2023), Disp: , Rfl:     methocarbamol (ROBAXIN) 500 MG tablet, Take 1 tablet by mouth 4 (Four) Times a Day. (Patient not taking: Reported on 12/28/2023), Disp: , Rfl:     Mounjaro 7.5 MG/0.5ML solution pen-injector, INJECT 0.5 mls into the skin ONCE WEEKLY (Patient not taking: Reported on 12/28/2023), Disp: , Rfl:     sucralfate (Carafate) 1 " "GM/10ML suspension, take 10ml BY MOUTH FOUR TIMES DAILY (Patient not taking: Reported on 12/28/2023), Disp: , Rfl:     Tirzepatide 5 MG/0.5ML solution pen-injector, Inject 1 mL under the skin into the appropriate area as directed Every 7 (Seven) Days. (Patient not taking: Reported on 12/28/2023), Disp: , Rfl:     triamterene-hydrochlorothiazide (MAXZIDE-25) 37.5-25 MG per tablet, Take 1 tablet by mouth Daily. (Patient not taking: Reported on 12/28/2023), Disp: , Rfl:      No Known Allergies    Family History   Problem Relation Age of Onset    Lung cancer Father     Cancer Father     Cancer Paternal Uncle     Colon cancer Neg Hx     Malig Hyperthermia Neg Hx         Social History     Social History Narrative    Not on file       Objective   Vital Signs:   BP 95/48 (BP Location: Left arm, Patient Position: Sitting, Cuff Size: Adult)   Pulse 64   Ht 162.6 cm (64\")   Wt 81.6 kg (180 lb)   SpO2 98%   BMI 30.90 kg/m²       Physical Exam  Constitutional:       Appearance: Normal appearance. She is obese.   Pulmonary:      Effort: Pulmonary effort is normal.   Neurological:      General: No focal deficit present.      Mental Status: She is alert and oriented to person, place, and time.   Psychiatric:         Mood and Affect: Mood normal.         Behavior: Behavior normal.           Assessment & Plan          Assessment and Plan    Diagnoses and all orders for this visit:    1. Gastroesophageal reflux disease without esophagitis (Primary)  -     Case Request; Standing  -     Obtain Informed Consent; Standing  -     Verify NPO; Standing  -     Case Request  -     US Abdomen Limited; Future  -     CBC (No Diff); Future  -     Comprehensive Metabolic Panel; Future  -     Protime-INR  -     AFP Tumor Marker    2. 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 JEFF (nonalcoholic steatohepatitis)  -     Case Request; Standing  -     Obtain Informed Consent; Standing  -     Verify NPO; Standing  -     Case Request  -     US Abdomen Limited; Future  -     CBC (No Diff); Future  -     Comprehensive Metabolic Panel; Future  -     Protime-INR  -     AFP Tumor Marker    4. Hepatomegaly  -     Case Request; Standing  -     Obtain Informed Consent; Standing  -     Verify NPO; Standing  -     Case Request  -     US Abdomen Limited; Future  -     CBC (No Diff); Future  -     Comprehensive Metabolic Panel; Future  -     Protime-INR  -     AFP Tumor Marker    5. Thrombocytopenia  -     Case Request; Standing  -     Obtain Informed Consent; Standing  -     Verify NPO; Standing  -     Case Request  -     US Abdomen Limited; Future  -     CBC (No Diff); Future  -     Comprehensive Metabolic Panel; Future  -     Protime-INR  -     AFP Tumor Marker    6. Diarrhea, unspecified type  -     Case Request; Standing  -     Obtain Informed Consent; Standing  -     Verify NPO; Standing  -     Case Request  -     US Abdomen Limited; Future  -     CBC (No Diff); Future  -     Comprehensive Metabolic Panel; Future  -     Protime-INR  -     AFP Tumor Marker    7. Irritable bowel syndrome with diarrhea  -     Case Request; Standing  -     Obtain Informed Consent; Standing  -     Verify NPO; Standing  -     Case Request  -     US Abdomen Limited; Future  -     CBC (No Diff); Future  -     Comprehensive Metabolic Panel; Future  -     Protime-INR  -     AFP Tumor Marker    8. Gastroesophageal reflux disease, unspecified whether esophagitis present  -     Case Request; Standing  -     Obtain Informed Consent; Standing  -     Verify NPO; Standing  -     Case Request  -     US Abdomen Limited; Future  -     CBC (No Diff); Future  -     Comprehensive Metabolic Panel; Future  -     Protime-INR  -     AFP Tumor  Marker      63-year-old female presenting the office today for follow-up  with a history of reflux, diabetes, diarrhea, cholecystectomy, fatty liver, breast cancer and suspected compensated cirrhosis secondary to [place]o.   She will continue colestipol 1 to 2 g/day titrating as needed as this is working well for her diarrhea.  I have refilled the patient's Nexium 40 mg twice daily due to continued reflux.  I have educated the patient that Mounjaro could be the source of her slow gastric emptying and worsening reflux but patient wishes to continue this medication as it is effective for her diabetes and weight loss.  Patient will discuss with primary care.  I have ordered labs and ultrasound for 6-month surveillance of the patient's cirrhosis.  We have reviewed recent labs and ultrasound in office.  I have recommended that the patient undergo further evaluation with an EGD.  I have discussed this procedure in detail with the patient.  I have discussed the risks, benefits and alternatives.  I have discussed the risk of anesthesia, bleeding and perforation.  Patient understands these risks, benefits and alternatives and wishes to proceed.  I will schedule her at her earliest convenience.  Patient agreeable to this plan will call with any questions or concerns.            Follow Up       Follow Up   Return for Follow up after endoscopy in office.  Patient was given instructions and counseling regarding her condition or for health maintenance advice. Please see specific information pulled into the AVS if appropriate.

## 2023-12-28 NOTE — PATIENT INSTRUCTIONS
Food Choices for Gastroesophageal Reflux Disease, Adult  When you have gastroesophageal reflux disease (GERD), the foods you eat and your eating habits are very important. Choosing the right foods can help ease your discomfort. Think about working with a food expert (dietitian) to help you make good choices.  What are tips for following this plan?  Reading food labels  Look for foods that are low in saturated fat. Foods that may help with your symptoms include:  Foods that have less than 5% of daily value (DV) of fat.  Foods that have 0 grams of trans fat.  Cooking  Do not oh your food.  Cook your food by baking, steaming, grilling, or broiling. These are all methods that do not need a lot of fat for cooking.  To add flavor, try to use herbs that are low in spice and acidity.  Meal planning    Choose healthy foods that are low in fat, such as:  Fruits and vegetables.  Whole grains.  Low-fat dairy products.  Lean meats, fish, and poultry.  Eat small meals often instead of eating 3 large meals each day. Eat your meals slowly in a place where you are relaxed. Avoid bending over or lying down until 2-3 hours after eating.  Limit high-fat foods such as fatty meats or fried foods.  Limit your intake of fatty foods, such as oils, butter, and shortening.  Avoid the following as told by your doctor:  Foods that cause symptoms. These may be different for different people. Keep a food diary to keep track of foods that cause symptoms.  Alcohol.  Drinking a lot of liquid with meals.  Eating meals during the 2-3 hours before bed.  Lifestyle  Stay at a healthy weight. Ask your doctor what weight is healthy for you. If you need to lose weight, work with your doctor to do so safely.  Exercise for at least 30 minutes on 5 or more days each week, or as told by your doctor.  Wear loose-fitting clothes.  Do not smoke or use any products that contain nicotine or tobacco. If you need help quitting, ask your doctor.  Sleep with the head  of your bed higher than your feet. Use a wedge under the mattress or blocks under the bed frame to raise the head of the bed.  Chew sugar-free gum after meals.  What foods should eat?    Eat a healthy, well-balanced diet of fruits, vegetables, whole grains, low-fat dairy products, lean meats, fish, and poultry. Each person is different.  Foods that may cause symptoms in one person may not cause any symptoms in another person. Work with your doctor to find foods that are safe for you.  The items listed above may not be a complete list of what you can eat and drink. Contact a food expert for more options.  What foods should I avoid?  Limiting some of these foods may help in managing the symptoms of GERD. Everyone is different. Talk with a food expert or your doctor to help you find the exact foods to avoid, if any.  Fruits  Any fruits prepared with added fat. Any fruits that cause symptoms. For some people, this may include citrus fruits, such as oranges, grapefruit, pineapple, and bennett.  Vegetables  Deep-fried vegetables. French fries. Any vegetables prepared with added fat. Any vegetables that cause symptoms. For some people, this may include tomatoes and tomato products, chili peppers, onions and garlic, and horseradish.  Grains  Pastries or quick breads with added fat.  Meats and other proteins  High-fat meats, such as fatty beef or pork, hot dogs, ribs, ham, sausage, salami, and dominique. Fried meat or protein, including fried fish and fried chicken. Nuts and nut butters, in large amounts.  Dairy  Whole milk and chocolate milk. Sour cream. Cream. Ice cream. Cream cheese. Milkshakes.  Fats and oils  Butter. Margarine. Shortening. Ghee.  Beverages  Coffee and tea, with or without caffeine. Carbonated beverages. Sodas. Energy drinks. Fruit juice made with acidic fruits, such as orange or grapefruit. Tomato juice. Alcoholic drinks.  Sweets and desserts  Chocolate and cocoa. Donuts.  Seasonings and condiments  Pepper.  Peppermint and spearmint. Added salt. Any condiments, herbs, or seasonings that cause symptoms. For some people, this may include david, hot sauce, or vinegar-based salad dressings.  The items listed above may not be a complete list of what you should not eat and drink. Contact a food expert for more options.  Questions to ask your doctor  Diet and lifestyle changes are often the first steps that are taken to manage symptoms of GERD. If diet and lifestyle changes do not help, talk with your doctor about taking medicines.  Where to find more information  [employer]: aboutgerd.org  Summary  When you have GERD, food and lifestyle choices are very important in easing your symptoms.  Eat small meals often instead of 3 large meals a day. Eat your meals slowly and in a place where you are relaxed.  Avoid bending over or lying down until 2-3 hours after eating.  Limit high-fat foods such as fatty meats or fried foods.  This information is not intended to replace advice given to you by your health care provider. Make sure you discuss any questions you have with your health care provider.  Document Revised: 06/28/2021 Document Reviewed: 06/28/2021  Elsevier Patient Education © 2023 [employer]  Gastroesophageal Reflux Disease, Adult  Gastroesophageal reflux (SCOTT) happens when acid from the stomach flows up into the tube that connects the mouth and the stomach (esophagus). Normally, food travels down the esophagus and stays in the stomach to be digested. However, when a person has SCOTT, food and stomach acid sometimes move back up into the esophagus. If this becomes a more serious problem, the person may be diagnosed with a disease called gastroesophageal reflux disease (GERD). GERD occurs when the reflux:  Happens often.  Causes frequent or severe symptoms.  Causes problems such as damage to the esophagus.  When stomach acid comes in contact with the esophagus, the acid may cause  inflammation in the esophagus. Over time, GERD may create small holes (ulcers) in the lining of the esophagus.  What are the causes?  This condition is caused by a problem with the muscle between the esophagus and the stomach (lower esophageal sphincter, or LES). Normally, the LES muscle closes after food passes through the esophagus to the stomach. When the LES is weakened or abnormal, it does not close properly, and that allows food and stomach acid to go back up into the esophagus.  The LES can be weakened by certain dietary substances, medicines, and medical conditions, including:  Tobacco use.  Pregnancy.  Having a hiatal hernia.  Alcohol use.  Certain foods and beverages, such as coffee, chocolate, onions, and peppermint.  What increases the risk?  You are more likely to develop this condition if you:  Have an increased body weight.  Have a connective tissue disorder.  Take NSAIDs, such as ibuprofen.  What are the signs or symptoms?  Symptoms of this condition include:  Heartburn.  Difficult or painful swallowing and the feeling of having a lump in the throat.  A bitter taste in the mouth.  Bad breath and having a large amount of saliva.  Having an upset or bloated stomach and belching.  Chest pain. Different conditions can cause chest pain. Make sure you see your health care provider if you experience chest pain.  Shortness of breath or wheezing.  Ongoing (chronic) cough or a nighttime cough.  Wearing away of tooth enamel.  Weight loss.  How is this diagnosed?  This condition may be diagnosed based on a medical history and a physical exam. To determine if you have mild or severe GERD, your health care provider may also monitor how you respond to treatment. You may also have tests, including:  A test to examine your stomach and esophagus with a small camera (endoscopy).  A test that measures the acidity level in your esophagus.  A test that measures how much pressure is on your esophagus.  A barium swallow or  modified barium swallow test to show the shape, size, and functioning of your esophagus.  How is this treated?  Treatment for this condition may vary depending on how severe your symptoms are. Your health care provider may recommend:  Changes to your diet.  Medicine.  Surgery.  The goal of treatment is to help relieve your symptoms and to prevent complications.  Follow these instructions at home:  Eating and drinking    Follow a diet as recommended by your health care provider. This may involve avoiding foods and drinks such as:  Coffee and tea, with or without caffeine.  Drinks that contain alcohol.  Energy drinks and sports drinks.  Carbonated drinks or sodas.  Chocolate and cocoa.  Peppermint and mint flavorings.  Garlic and onions.  Horseradish.  Spicy and acidic foods, including peppers, chili powder, david powder, vinegar, hot sauces, and barbecue sauce.  Citrus fruit juices and citrus fruits, such as oranges, bennett, and limes.  Tomato-based foods, such as red sauce, chili, salsa, and pizza with red sauce.  Fried and fatty foods, such as donuts, french fries, potato chips, and high-fat dressings.  High-fat meats, such as hot dogs and fatty cuts of red and white meats, such as rib eye steak, sausage, ham, and dominique.  High-fat dairy items, such as whole milk, butter, and cream cheese.  Eat small, frequent meals instead of large meals.  Avoid drinking large amounts of liquid with your meals.  Avoid eating meals during the 2-3 hours before bedtime.  Avoid lying down right after you eat.  Do not exercise right after you eat.  Lifestyle    Do not use any products that contain nicotine or tobacco. These products include cigarettes, chewing tobacco, and vaping devices, such as e-cigarettes. If you need help quitting, ask your health care provider.  Try to reduce your stress by using methods such as yoga or meditation. If you need help reducing stress, ask your health care provider.  If you are overweight, reduce your  weight to an amount that is healthy for you. Ask your health care provider for guidance about a safe weight loss goal.  General instructions  Pay attention to any changes in your symptoms.  Take over-the-counter and prescription medicines only as told by your health care provider. Do not take aspirin, ibuprofen, or other NSAIDs unless your health care provider told you to take these medicines.  Wear loose-fitting clothing. Do not wear anything tight around your waist that causes pressure on your abdomen.  Raise (elevate) the head of your bed about 6 inches (15 cm). You can use a wedge to do this.  Avoid bending over if this makes your symptoms worse.  Keep all follow-up visits. This is important.  Contact a health care provider if:  You have:  New symptoms.  Unexplained weight loss.  Difficulty swallowing or it hurts to swallow.  Wheezing or a persistent cough.  A hoarse voice.  Your symptoms do not improve with treatment.  Get help right away if:  You have sudden pain in your arms, neck, jaw, teeth, or back.  You suddenly feel sweaty, dizzy, or light-headed.  You have chest pain or shortness of breath.  You vomit and the vomit is green, yellow, or black, or it looks like blood or coffee grounds.  You faint.  You have stool that is red, bloody, or black.  You cannot swallow, drink, or eat.  These symptoms may represent a serious problem that is an emergency. Do not wait to see if the symptoms will go away. Get medical help right away. Call your local emergency services (911 in the [place] Do not drive yourself to the hospital.  Summary  Gastroesophageal reflux happens when acid from the stomach flows up into the esophagus. GERD is a disease in which the reflux happens often, causes frequent or severe symptoms, or causes problems such as damage to the esophagus.  Treatment for this condition may vary depending on how severe your symptoms are. Your health care provider may recommend diet and lifestyle changes,  medicine, or surgery.  Contact a health care provider if you have new or worsening symptoms.  Take over-the-counter and prescription medicines only as told by your health care provider. Do not take aspirin, ibuprofen, or other NSAIDs unless your health care provider told you to do so.  Keep all follow-up visits as told by your health care provider. This is important.  This information is not intended to replace advice given to you by your health care provider. Make sure you discuss any questions you have with your health care provider.  Document Revised: 06/28/2021 Document Reviewed: 06/28/2021  Elsevier Patient Education © 2023 Elsevier Inc.

## 2024-01-19 RX ORDER — MONTELUKAST SODIUM 4 MG/1
2 TABLET, CHEWABLE ORAL
Qty: 60 TABLET | Refills: 2 | Status: SHIPPED | OUTPATIENT
Start: 2024-01-19

## 2024-01-29 ENCOUNTER — TELEPHONE (OUTPATIENT)
Dept: GASTROENTEROLOGY | Facility: CLINIC | Age: 64
End: 2024-01-29
Payer: MEDICARE

## 2024-01-29 NOTE — TELEPHONE ENCOUNTER
Called pt left message to call the office back left office zay number. Will send pt Trellisehart message

## 2024-02-01 RX ORDER — ESOMEPRAZOLE MAGNESIUM 40 MG/1
40 CAPSULE, DELAYED RELEASE ORAL 2 TIMES DAILY
Qty: 60 CAPSULE | Refills: 5 | Status: SHIPPED | OUTPATIENT
Start: 2024-02-01

## 2024-02-13 ENCOUNTER — LAB (OUTPATIENT)
Dept: LAB | Facility: HOSPITAL | Age: 64
End: 2024-02-13
Payer: MEDICARE

## 2024-02-13 DIAGNOSIS — K58.0 IRRITABLE BOWEL SYNDROME WITH DIARRHEA: ICD-10-CM

## 2024-02-13 DIAGNOSIS — K74.60 LIVER CIRRHOSIS SECONDARY TO NASH: ICD-10-CM

## 2024-02-13 DIAGNOSIS — R16.0 HEPATOMEGALY: ICD-10-CM

## 2024-02-13 DIAGNOSIS — K21.9 GASTROESOPHAGEAL REFLUX DISEASE WITHOUT ESOPHAGITIS: ICD-10-CM

## 2024-02-13 DIAGNOSIS — D69.6 THROMBOCYTOPENIA: ICD-10-CM

## 2024-02-13 DIAGNOSIS — K21.9 GASTROESOPHAGEAL REFLUX DISEASE, UNSPECIFIED WHETHER ESOPHAGITIS PRESENT: ICD-10-CM

## 2024-02-13 DIAGNOSIS — R19.7 DIARRHEA, UNSPECIFIED TYPE: ICD-10-CM

## 2024-02-13 DIAGNOSIS — K75.81 NASH (NONALCOHOLIC STEATOHEPATITIS): ICD-10-CM

## 2024-02-13 DIAGNOSIS — K75.81 LIVER CIRRHOSIS SECONDARY TO NASH: ICD-10-CM

## 2024-02-13 LAB
ALBUMIN SERPL-MCNC: 4.3 G/DL (ref 3.5–5.2)
ALBUMIN/GLOB SERPL: 1.8 G/DL
ALP SERPL-CCNC: 68 U/L (ref 39–117)
ALPHA-FETOPROTEIN: <2 NG/ML (ref 0–8.3)
ALT SERPL W P-5'-P-CCNC: 9 U/L (ref 1–33)
ANION GAP SERPL CALCULATED.3IONS-SCNC: 10.9 MMOL/L (ref 5–15)
AST SERPL-CCNC: 12 U/L (ref 1–32)
BILIRUB SERPL-MCNC: 0.3 MG/DL (ref 0–1.2)
BUN SERPL-MCNC: 20 MG/DL (ref 8–23)
BUN/CREAT SERPL: 22 (ref 7–25)
CALCIUM SPEC-SCNC: 9.3 MG/DL (ref 8.6–10.5)
CHLORIDE SERPL-SCNC: 102 MMOL/L (ref 98–107)
CO2 SERPL-SCNC: 26.1 MMOL/L (ref 22–29)
CREAT SERPL-MCNC: 0.91 MG/DL (ref 0.57–1)
DEPRECATED RDW RBC AUTO: 41.4 FL (ref 37–54)
EGFRCR SERPLBLD CKD-EPI 2021: 71.5 ML/MIN/1.73
ERYTHROCYTE [DISTWIDTH] IN BLOOD BY AUTOMATED COUNT: 12.8 % (ref 12.3–15.4)
GLOBULIN UR ELPH-MCNC: 2.4 GM/DL
GLUCOSE SERPL-MCNC: 161 MG/DL (ref 65–99)
HCT VFR BLD AUTO: 35.7 % (ref 34–46.6)
HGB BLD-MCNC: 11.4 G/DL (ref 12–15.9)
INR PPP: 0.96 (ref 0.86–1.15)
MCH RBC QN AUTO: 29 PG (ref 26.6–33)
MCHC RBC AUTO-ENTMCNC: 31.9 G/DL (ref 31.5–35.7)
MCV RBC AUTO: 90.8 FL (ref 79–97)
PLATELET # BLD AUTO: 95 10*3/MM3 (ref 140–450)
PMV BLD AUTO: 10.5 FL (ref 6–12)
POTASSIUM SERPL-SCNC: 4.7 MMOL/L (ref 3.5–5.2)
PROT SERPL-MCNC: 6.7 G/DL (ref 6–8.5)
PROTHROMBIN TIME: 13 SECONDS (ref 11.8–14.9)
RBC # BLD AUTO: 3.93 10*6/MM3 (ref 3.77–5.28)
SODIUM SERPL-SCNC: 139 MMOL/L (ref 136–145)
WBC NRBC COR # BLD AUTO: 3.86 10*3/MM3 (ref 3.4–10.8)

## 2024-02-13 PROCEDURE — 85610 PROTHROMBIN TIME: CPT | Performed by: NURSE PRACTITIONER

## 2024-02-13 PROCEDURE — 80053 COMPREHEN METABOLIC PANEL: CPT

## 2024-02-13 PROCEDURE — 85027 COMPLETE CBC AUTOMATED: CPT

## 2024-02-13 PROCEDURE — 82105 ALPHA-FETOPROTEIN SERUM: CPT | Performed by: NURSE PRACTITIONER

## 2024-02-14 ENCOUNTER — TRANSCRIBE ORDERS (OUTPATIENT)
Dept: ADMINISTRATIVE | Facility: HOSPITAL | Age: 64
End: 2024-02-14
Payer: MEDICARE

## 2024-02-14 DIAGNOSIS — N63.21 MASS OF UPPER OUTER QUADRANT OF LEFT BREAST: Primary | ICD-10-CM

## 2024-02-14 DIAGNOSIS — Z86.000 HISTORY OF DUCTAL CARCINOMA IN SITU (DCIS) OF BREAST: ICD-10-CM

## 2024-02-15 ENCOUNTER — TELEPHONE (OUTPATIENT)
Dept: GASTROENTEROLOGY | Facility: CLINIC | Age: 64
End: 2024-02-15
Payer: MEDICARE

## 2024-02-15 DIAGNOSIS — K74.60 CIRRHOSIS OF LIVER WITHOUT ASCITES, UNSPECIFIED HEPATIC CIRRHOSIS TYPE: ICD-10-CM

## 2024-02-15 DIAGNOSIS — D69.6 THROMBOCYTOPENIA: Primary | ICD-10-CM

## 2024-02-23 ENCOUNTER — HOSPITAL ENCOUNTER (OUTPATIENT)
Dept: ULTRASOUND IMAGING | Facility: HOSPITAL | Age: 64
Discharge: HOME | End: 2024-02-23
Payer: MEDICARE

## 2024-02-23 ENCOUNTER — HOSPITAL ENCOUNTER (OUTPATIENT)
Dept: MAMMOGRAPHY | Facility: HOSPITAL | Age: 64
Discharge: HOME | End: 2024-02-23
Payer: MEDICARE

## 2024-02-23 DIAGNOSIS — N63.21 MASS OF UPPER OUTER QUADRANT OF LEFT BREAST: ICD-10-CM

## 2024-02-23 DIAGNOSIS — Z86.000 HISTORY OF DUCTAL CARCINOMA IN SITU (DCIS) OF BREAST: ICD-10-CM

## 2024-02-23 PROCEDURE — 77065 DX MAMMO INCL CAD UNI: CPT

## 2024-02-23 PROCEDURE — 76642 ULTRASOUND BREAST LIMITED: CPT

## 2024-02-23 PROCEDURE — G0279 TOMOSYNTHESIS, MAMMO: HCPCS

## 2024-03-07 ENCOUNTER — TRANSCRIBE ORDERS (OUTPATIENT)
Dept: ADMINISTRATIVE | Facility: HOSPITAL | Age: 64
End: 2024-03-07
Payer: MEDICARE

## 2024-03-07 DIAGNOSIS — Z12.2 SCREENING FOR LUNG CANCER: Primary | ICD-10-CM

## 2024-03-07 DIAGNOSIS — Z87.891 HISTORY OF TOBACCO USE: ICD-10-CM

## 2024-03-13 ENCOUNTER — HOSPITAL ENCOUNTER (OUTPATIENT)
Dept: CT IMAGING | Facility: HOSPITAL | Age: 64
Discharge: HOME | End: 2024-03-13
Admitting: NURSE PRACTITIONER
Payer: MEDICARE

## 2024-03-13 DIAGNOSIS — Z12.2 SCREENING FOR LUNG CANCER: ICD-10-CM

## 2024-03-13 DIAGNOSIS — Z87.891 HISTORY OF TOBACCO USE: ICD-10-CM

## 2024-03-13 PROCEDURE — 71271 CT THORAX LUNG CANCER SCR C-: CPT

## 2024-04-04 NOTE — PRE-PROCEDURE INSTRUCTIONS
"Instructed on date and arrival time of 0600. Come to entrance \"C\".  Must have  over age 18 to drive home.  May have two visitors; however, children under 12 must stay in waiting room.  Discussed diet/NPO.  May take medications as usual except for blood thinners, diabetic medications, or weight loss medications.  Verbalized understanding of instructions given.  Instructed to call for questions or concerns.  Hold Ozempic for one week prior to procedure.  "

## 2024-04-11 ENCOUNTER — ANESTHESIA EVENT (OUTPATIENT)
Dept: GASTROENTEROLOGY | Facility: HOSPITAL | Age: 64
End: 2024-04-11
Payer: MEDICARE

## 2024-04-11 NOTE — ANESTHESIA PREPROCEDURE EVALUATION
Anesthesia Evaluation     Patient summary reviewed and Nursing notes reviewed   NPO Solid Status: > 8 hours  NPO Liquid Status: > 8 hours           Airway   Mallampati: I  TM distance: >3 FB  Neck ROM: full  No difficulty expected  Dental    (+) edentulous    Pulmonary     breath sounds clear to auscultation  Cardiovascular - normal exam  Exercise tolerance: good (4-7 METS)    Patient on routine beta blocker  Rhythm: regular  Rate: normal    (+) hypertension well controlled 2 medications or greater, CAD, hyperlipidemia,  carotid artery disease left carotid      Neuro/Psych  (+) headaches, numbness  GI/Hepatic/Renal/Endo    (+) obesity, GERD well controlled, hepatitis, liver disease (JEFF, hepatomegaly) fatty liver disease, diabetes mellitus type 2    Musculoskeletal     (+) neck pain  Abdominal     Abdomen: soft.   Substance History      OB/GYN          Other   arthritis,   history of cancer (breast cancer)    ROS/Med Hx Other: Mounjaro- patient no longer taking. Patient now taking Ozempic- last had 4/5/24- informed patient to not take dose on Friday 4/12/24.     - NORMAL ECG -  Sinus rhythm  When compared with ECG of 11-May-2021 18:03:36,  Significant axis, voltage or hypertrophy change  Electronically Signed By: Abdulaziz Min ([facility]) 16-Aug-2021 20:45:53  Date and Time of Study: 2021-08-16 11:48:40    8/18/23- DUPLEX  ·  Proximal right internal carotid artery plaque without significant stenosis.  ·  All other right side carotid system vessels are normal.  ·  Left internal carotid artery demonstrates a 50-69% stenosis.  No significant change by velocity in comparison to study dated 1/16/2023.  ·  All other left side carotid system vessels are normal.  ·  Vertebral flow is antegrade bilaterally.                  Anesthesia Plan    ASA 3     general   total IV anesthesia  intravenous induction     Anesthetic plan, risks, benefits, and alternatives have been provided, discussed and informed consent has been obtained  with: patient and spouse/significant other.  Pre-procedure education provided  Plan discussed with CRNA.    CODE STATUS:

## 2024-04-15 ENCOUNTER — ANESTHESIA (OUTPATIENT)
Dept: GASTROENTEROLOGY | Facility: HOSPITAL | Age: 64
End: 2024-04-15
Payer: MEDICARE

## 2024-04-15 ENCOUNTER — HOSPITAL ENCOUNTER (OUTPATIENT)
Facility: HOSPITAL | Age: 64
Setting detail: HOSPITAL OUTPATIENT SURGERY
Discharge: HOME | End: 2024-04-15
Attending: INTERNAL MEDICINE | Admitting: INTERNAL MEDICINE
Payer: MEDICARE

## 2024-04-15 VITALS
TEMPERATURE: 98.7 F | RESPIRATION RATE: 15 BRPM | OXYGEN SATURATION: 95 % | BODY MASS INDEX: 33.6 KG/M2 | SYSTOLIC BLOOD PRESSURE: 108 MMHG | WEIGHT: 195.77 LBS | DIASTOLIC BLOOD PRESSURE: 58 MMHG | HEART RATE: 63 BPM

## 2024-04-15 DIAGNOSIS — K21.9 GASTROESOPHAGEAL REFLUX DISEASE WITHOUT ESOPHAGITIS: ICD-10-CM

## 2024-04-15 DIAGNOSIS — D69.6 THROMBOCYTOPENIA: ICD-10-CM

## 2024-04-15 DIAGNOSIS — K75.81 NASH (NONALCOHOLIC STEATOHEPATITIS): ICD-10-CM

## 2024-04-15 DIAGNOSIS — K75.81 LIVER CIRRHOSIS SECONDARY TO NASH: ICD-10-CM

## 2024-04-15 DIAGNOSIS — R16.0 HEPATOMEGALY: ICD-10-CM

## 2024-04-15 DIAGNOSIS — K74.60 LIVER CIRRHOSIS SECONDARY TO NASH: ICD-10-CM

## 2024-04-15 DIAGNOSIS — R19.7 DIARRHEA, UNSPECIFIED TYPE: ICD-10-CM

## 2024-04-15 DIAGNOSIS — K58.0 IRRITABLE BOWEL SYNDROME WITH DIARRHEA: ICD-10-CM

## 2024-04-15 DIAGNOSIS — K21.9 GASTROESOPHAGEAL REFLUX DISEASE, UNSPECIFIED WHETHER ESOPHAGITIS PRESENT: ICD-10-CM

## 2024-04-15 LAB — GLUCOSE BLDC GLUCOMTR-MCNC: 145 MG/DL (ref 70–99)

## 2024-04-15 PROCEDURE — 25810000003 LACTATED RINGERS PER 1000 ML

## 2024-04-15 PROCEDURE — 25010000002 PROPOFOL 10 MG/ML EMULSION: Performed by: NURSE ANESTHETIST, CERTIFIED REGISTERED

## 2024-04-15 PROCEDURE — 88305 TISSUE EXAM BY PATHOLOGIST: CPT | Performed by: INTERNAL MEDICINE

## 2024-04-15 PROCEDURE — 82948 REAGENT STRIP/BLOOD GLUCOSE: CPT

## 2024-04-15 PROCEDURE — 88342 IMHCHEM/IMCYTCHM 1ST ANTB: CPT | Performed by: INTERNAL MEDICINE

## 2024-04-15 RX ORDER — PROPOFOL 10 MG/ML
VIAL (ML) INTRAVENOUS AS NEEDED
Status: DISCONTINUED | OUTPATIENT
Start: 2024-04-15 | End: 2024-04-15 | Stop reason: SURG

## 2024-04-15 RX ORDER — LIDOCAINE HYDROCHLORIDE 20 MG/ML
INJECTION, SOLUTION EPIDURAL; INFILTRATION; INTRACAUDAL; PERINEURAL AS NEEDED
Status: DISCONTINUED | OUTPATIENT
Start: 2024-04-15 | End: 2024-04-15 | Stop reason: SURG

## 2024-04-15 RX ORDER — SODIUM CHLORIDE, SODIUM LACTATE, POTASSIUM CHLORIDE, CALCIUM CHLORIDE 600; 310; 30; 20 MG/100ML; MG/100ML; MG/100ML; MG/100ML
30 INJECTION, SOLUTION INTRAVENOUS CONTINUOUS
Status: DISCONTINUED | OUTPATIENT
Start: 2024-04-15 | End: 2024-04-15 | Stop reason: HOSPADM

## 2024-04-15 RX ADMIN — LIDOCAINE HYDROCHLORIDE 100 MG: 20 INJECTION, SOLUTION EPIDURAL; INFILTRATION; INTRACAUDAL; PERINEURAL at 07:34

## 2024-04-15 RX ADMIN — PROPOFOL 50 MG: 10 INJECTION, EMULSION INTRAVENOUS at 07:34

## 2024-04-15 RX ADMIN — SODIUM CHLORIDE, POTASSIUM CHLORIDE, SODIUM LACTATE AND CALCIUM CHLORIDE 30 ML/HR: 600; 310; 30; 20 INJECTION, SOLUTION INTRAVENOUS at 06:39

## 2024-04-15 RX ADMIN — PROPOFOL 250 MCG/KG/MIN: 10 INJECTION, EMULSION INTRAVENOUS at 07:34

## 2024-04-15 NOTE — H&P
Pre Procedure History & Physical    Chief Complaint:   Gastric intestinal metaplasia  gerd    Subjective     HPI:   As above    Past Medical History:   Past Medical History:   Diagnosis Date    Breast cancer     LEFT BREAST    Carotid artery calcification     pt is going to have endarectomy 8/24/21    Coronary artery disease 2020    Diabetes mellitus, type II     Fatty liver     GERD (gastroesophageal reflux disease)     Hernia Dont know    High cholesterol     Hypertension     Irritable bowel syndrome     Liver disease     FATTY LIVER       Past Surgical History:  Past Surgical History:   Procedure Laterality Date    BACK SURGERY      BREAST BIOPSY      BREAST LUMPECTOMY      left    CAROTID ENDARTERECTOMY Left     CHOLECYSTECTOMY      COLONOSCOPY  05/2021    luis alberto    ENDOSCOPY N/A 01/31/2022    Procedure: ESOPHAGOGASTRODUODENOSCOPY WITH BX;  Surgeon: Star Fischer MD;  Location: [facility] ENDOSCOPY;  Service: Gastroenterology;  Laterality: N/A;  NORMAL EGD    ENDOSCOPY N/A 04/28/2023    Procedure: ESOPHAGOGASTRODUODENOSCOPY WITH BIOPSY;  Surgeon: Star Fischer MD;  Location: [facility] ENDOSCOPY;  Service: Gastroenterology;  Laterality: N/A;  RETAINED FOOD    SPINE SURGERY      TUBAL ABDOMINAL LIGATION      UPPER GASTROINTESTINAL ENDOSCOPY         Family History:  Family History   Problem Relation Age of Onset    Lung cancer Father     Cancer Father     Cancer Paternal Uncle     Colon cancer Neg Hx     Malig Hyperthermia Neg Hx        Social History:   reports that she quit smoking about 7 years ago. Her smoking use included cigarettes. She started smoking about 37 years ago. She has a 30 pack-year smoking history. She has been exposed to tobacco smoke. She has never used smokeless tobacco. She reports that she does not drink alcohol and does not use drugs.    Medications:   Medications Prior to Admission   Medication Sig Dispense Refill Last Dose    amLODIPine (NORVASC) 10 MG tablet Take 1 tablet by  mouth Daily.   4/14/2024    aspirin 81 MG EC tablet Take 1 tablet by mouth Daily. Last dose Friday 1/28 4/14/2024    carvedilol (COREG) 6.25 MG tablet Take 1 tablet by mouth 2 (Two) Times a Day With Meals.   4/14/2024    colestipol (COLESTID) 1 g tablet TAKE 2 TABLETS BY MOUTH EVERY NIGHT AT BEDTIME 60 tablet 2 4/14/2024    DULoxetine (CYMBALTA) 20 MG capsule Take 1 capsule by mouth Daily.   4/14/2024    esomeprazole (nexIUM) 40 MG capsule TAKE 1 capsule BY MOUTH TWICE DAILY 60 capsule 5 4/14/2024    gabapentin (NEURONTIN) 300 MG capsule Take 2 capsules by mouth 3 (Three) Times a Day.   4/14/2024    hydroCHLOROthiazide (HYDRODIURIL) 25 MG tablet Take 1 tablet by mouth Daily.   4/14/2024    HYDROcodone-acetaminophen (NORCO) 5-325 MG per tablet hydrocodone 5 mg-acetaminophen 325 mg tablet   TAKE 1 TABLET BY MOUTH EVERY 8 TO 12 HOURS AS NEEDED FOR SEVERE PAIN. FILL DATE 05/12/2022 4/14/2024    Insulin Glargine, 2 Unit Dial, (Toujeo Max SoloStar) 300 UNIT/ML solution pen-injector injection Inject 30 Units under the skin into the appropriate area as directed Daily.   4/14/2024    Insulin Lispro, 1 Unit Dial, (HUMALOG) 100 UNIT/ML solution pen-injector Inject 10 units twice daily with meals plus sliding scale.   4/14/2024    lisinopril (PRINIVIL,ZESTRIL) 40 MG tablet Take 1 tablet by mouth Daily.   4/14/2024    meloxicam (MOBIC) 15 MG tablet    4/14/2024    metFORMIN (GLUCOPHAGE) 1000 MG tablet Take 1 tablet twice a day by oral route.   4/14/2024    methocarbamol (ROBAXIN) 500 MG tablet Take 1 tablet by mouth 4 (Four) Times a Day.   4/14/2024    pravastatin (PRAVACHOL) 80 MG tablet Take 1 tablet by mouth Daily.   4/14/2024    BD Pen Needle Awilda U/F 32G X 4 MM misc INJECT using 1 FOUR TIMES DAILY before meals and nightly       Continuous Blood Gluc Sensor (Dexcom G6 Sensor) apply 1 topically EVERY 10 days       Continuous Blood Gluc Transmit (Dexcom G6 Transmitter) misc See Admin Instructions.       Insulin  "Syringe-Needle U-100 (BD Veo Insulin Syringe U/F) 31G X 15/64\" 1 ML misc USE TO INJECT INTO THE SKIN AS NEEDED       Tirzepatide 5 MG/0.5ML solution pen-injector Inject 1 mL under the skin into the appropriate area as directed Every 7 (Seven) Days. (Patient not taking: Reported on 12/28/2023)          Allergies:  Patient has no known allergies.        Objective     Blood pressure 147/58, pulse 57, temperature 97 °F (36.1 °C), temperature source Temporal, resp. rate 18, weight 88.8 kg (195 lb 12.3 oz), SpO2 95%, not currently breastfeeding.    Physical Exam   Constitutional: Pt is oriented to person, place, and time and well-developed, well-nourished, and in no distress.   Mouth/Throat: Oropharynx is clear and moist.   Neck: Normal range of motion.   Cardiovascular: Normal rate, regular rhythm and normal heart sounds.    Pulmonary/Chest: Effort normal and breath sounds normal.   Abdominal: Soft. Nontender  Skin: Skin is warm and dry.   Psychiatric: Mood, memory, affect and judgment normal.     Assessment & Plan     Diagnosis:  Gastric intestinal metaplasia  gerd    Anticipated Surgical Procedure:  egd    The risks, benefits, and alternatives of this procedure have been discussed with the patient or the responsible party- the patient understands and agrees to proceed.            "

## 2024-04-15 NOTE — ANESTHESIA POSTPROCEDURE EVALUATION
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      JEFF (nonalcoholic steatohepatitis)      Hepatomegaly      Thrombocytopenia      Diarrhea, unspecified type      Irritable bowel syndrome with diarrhea      Gastroesophageal reflux disease, unspecified whether esophagitis present      (Gastroesophageal reflux disease without esophagitis [K21.9])      (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 [K75.81, K74.60])      (JEFF (nonalcoholic steatohepatitis) [K75.81])      (Hepatomegaly [R16.0])      (Thrombocytopenia [D69.6])      (Diarrhea, unspecified type [R19.7])      (Irritable bowel syndrome with diarrhea [K58.0])      (Gastroesophageal reflux disease, unspecified whether esophagitis present [K21.9])    Surgeons: Star Fischer MD Provider: Zbigniew Plummer CRNA    Anesthesia Type: general ASA Status: 3            Anesthesia Type: general    Vitals  Vitals Value Taken Time   /58 04/15/24 0805   Temp 37.1 °C (98.7 °F) 04/15/24 0805   Pulse 61 04/15/24 0806   Resp 15 04/15/24 0805   SpO2 93 % 04/15/24 0806   Vitals shown include unfiled device data.        Post Anesthesia Care and Evaluation    Post-procedure mental status: acceptable.  Pain management: satisfactory to patient    Airway patency: patent  Anesthetic complications: No anesthetic complications    Cardiovascular status: acceptable  Respiratory status: acceptable    Comments: Per chart review

## 2024-04-16 ENCOUNTER — TELEPHONE (OUTPATIENT)
Dept: GASTROENTEROLOGY | Facility: CLINIC | Age: 64
End: 2024-04-16
Payer: MEDICARE

## 2024-04-17 ENCOUNTER — TELEPHONE (OUTPATIENT)
Dept: GASTROENTEROLOGY | Facility: CLINIC | Age: 64
End: 2024-04-17
Payer: MEDICARE

## 2024-04-17 NOTE — TELEPHONE ENCOUNTER
----- Message from ALEXIS George sent at 4/16/2024  2:55 PM EDT -----  I have reviewed the patients upper endoscopy and pathology.  Stomach biopsies show chronic active gastritis.  Biopsies are negative for H. Pylori, dysplasia, metaplasia, and malignancy.  Please avoid all NSAIDs.  Continue Nexium daily.  Recommend repeat EGD in 2 years due to history of gastric intestinal metaplasia.  Please place on recall. Send letter.

## 2024-05-01 ENCOUNTER — LAB (OUTPATIENT)
Dept: LAB | Facility: HOSPITAL | Age: 64
End: 2024-05-01
Payer: MEDICARE

## 2024-05-01 DIAGNOSIS — K74.60 CIRRHOSIS OF LIVER WITHOUT ASCITES, UNSPECIFIED HEPATIC CIRRHOSIS TYPE: ICD-10-CM

## 2024-05-01 DIAGNOSIS — D69.6 THROMBOCYTOPENIA: ICD-10-CM

## 2024-05-01 LAB
BASOPHILS # BLD AUTO: 0.03 10*3/MM3 (ref 0–0.2)
BASOPHILS NFR BLD AUTO: 0.7 % (ref 0–1.5)
DEPRECATED RDW RBC AUTO: 47.9 FL (ref 37–54)
EOSINOPHIL # BLD AUTO: 0.15 10*3/MM3 (ref 0–0.4)
EOSINOPHIL NFR BLD AUTO: 3.6 % (ref 0.3–6.2)
ERYTHROCYTE [DISTWIDTH] IN BLOOD BY AUTOMATED COUNT: 14.1 % (ref 12.3–15.4)
HCT VFR BLD AUTO: 37.7 % (ref 34–46.6)
HGB BLD-MCNC: 12 G/DL (ref 12–15.9)
IMM GRANULOCYTES # BLD AUTO: 0.02 10*3/MM3 (ref 0–0.05)
IMM GRANULOCYTES NFR BLD AUTO: 0.5 % (ref 0–0.5)
LYMPHOCYTES # BLD AUTO: 1.14 10*3/MM3 (ref 0.7–3.1)
LYMPHOCYTES NFR BLD AUTO: 27.6 % (ref 19.6–45.3)
MCH RBC QN AUTO: 29.3 PG (ref 26.6–33)
MCHC RBC AUTO-ENTMCNC: 31.8 G/DL (ref 31.5–35.7)
MCV RBC AUTO: 92.2 FL (ref 79–97)
MONOCYTES # BLD AUTO: 0.29 10*3/MM3 (ref 0.1–0.9)
MONOCYTES NFR BLD AUTO: 7 % (ref 5–12)
NEUTROPHILS NFR BLD AUTO: 2.5 10*3/MM3 (ref 1.7–7)
NEUTROPHILS NFR BLD AUTO: 60.6 % (ref 42.7–76)
NRBC BLD AUTO-RTO: 0 /100 WBC (ref 0–0.2)
PLATELET # BLD AUTO: 93 10*3/MM3 (ref 140–450)
PMV BLD AUTO: 10.6 FL (ref 6–12)
RBC # BLD AUTO: 4.09 10*6/MM3 (ref 3.77–5.28)
WBC NRBC COR # BLD AUTO: 4.13 10*3/MM3 (ref 3.4–10.8)

## 2024-05-01 PROCEDURE — 36415 COLL VENOUS BLD VENIPUNCTURE: CPT

## 2024-05-01 PROCEDURE — 85025 COMPLETE CBC W/AUTO DIFF WBC: CPT

## 2024-05-03 ENCOUNTER — TELEPHONE (OUTPATIENT)
Dept: GASTROENTEROLOGY | Facility: CLINIC | Age: 64
End: 2024-05-03
Payer: MEDICARE

## 2024-05-03 NOTE — TELEPHONE ENCOUNTER
Attempted to contact patient today to inform her of results. No answer, LVM for pt to contact the office.

## 2024-05-03 NOTE — TELEPHONE ENCOUNTER
----- Message from Maritza EASON sent at 5/2/2024 11:56 AM EDT -----  I have reviewed the patient's most recent CBC.  Hemoglobin is now normal, platelets remain decreased this is a stable finding compared to previous labs.  Maintain follow-up appointments as scheduled

## 2024-06-10 ENCOUNTER — HOSPITAL ENCOUNTER (OUTPATIENT)
Dept: ULTRASOUND IMAGING | Facility: HOSPITAL | Age: 64
Discharge: HOME | End: 2024-06-10
Admitting: NURSE PRACTITIONER
Payer: MEDICARE

## 2024-06-10 DIAGNOSIS — R16.0 HEPATOMEGALY: ICD-10-CM

## 2024-06-10 DIAGNOSIS — D69.6 THROMBOCYTOPENIA: ICD-10-CM

## 2024-06-10 DIAGNOSIS — K21.9 GASTROESOPHAGEAL REFLUX DISEASE, UNSPECIFIED WHETHER ESOPHAGITIS PRESENT: ICD-10-CM

## 2024-06-10 DIAGNOSIS — K74.60 LIVER CIRRHOSIS SECONDARY TO NASH: ICD-10-CM

## 2024-06-10 DIAGNOSIS — R19.7 DIARRHEA, UNSPECIFIED TYPE: ICD-10-CM

## 2024-06-10 DIAGNOSIS — K75.81 LIVER CIRRHOSIS SECONDARY TO NASH: ICD-10-CM

## 2024-06-10 DIAGNOSIS — K58.0 IRRITABLE BOWEL SYNDROME WITH DIARRHEA: ICD-10-CM

## 2024-06-10 DIAGNOSIS — K75.81 NASH (NONALCOHOLIC STEATOHEPATITIS): ICD-10-CM

## 2024-06-10 DIAGNOSIS — K21.9 GASTROESOPHAGEAL REFLUX DISEASE WITHOUT ESOPHAGITIS: ICD-10-CM

## 2024-06-10 PROCEDURE — 76705 ECHO EXAM OF ABDOMEN: CPT

## 2024-06-11 ENCOUNTER — TELEPHONE (OUTPATIENT)
Dept: GASTROENTEROLOGY | Facility: CLINIC | Age: 64
End: 2024-06-11
Payer: MEDICARE

## 2024-06-11 DIAGNOSIS — K74.60 CIRRHOSIS OF LIVER WITHOUT ASCITES, UNSPECIFIED HEPATIC CIRRHOSIS TYPE: Primary | ICD-10-CM

## 2024-06-11 NOTE — TELEPHONE ENCOUNTER
----- Message from Sarah Ramsey sent at 6/11/2024  2:42 PM EDT -----  Hepatic steatosis which is fatty liver and mildly enlarged spleen.  Repeat 6 months.

## 2024-06-24 RX ORDER — MONTELUKAST SODIUM 4 MG/1
2 TABLET, CHEWABLE ORAL
Qty: 60 TABLET | Refills: 2 | Status: SHIPPED | OUTPATIENT
Start: 2024-06-24

## 2024-07-22 ENCOUNTER — OFFICE VISIT (OUTPATIENT)
Dept: GASTROENTEROLOGY | Facility: CLINIC | Age: 64
End: 2024-07-22
Payer: MEDICARE

## 2024-07-22 VITALS
OXYGEN SATURATION: 97 % | SYSTOLIC BLOOD PRESSURE: 131 MMHG | DIASTOLIC BLOOD PRESSURE: 55 MMHG | BODY MASS INDEX: 32.84 KG/M2 | HEART RATE: 64 BPM | WEIGHT: 191.3 LBS

## 2024-07-22 DIAGNOSIS — K74.60 LIVER CIRRHOSIS SECONDARY TO NASH: ICD-10-CM

## 2024-07-22 DIAGNOSIS — K75.81 NASH (NONALCOHOLIC STEATOHEPATITIS): ICD-10-CM

## 2024-07-22 DIAGNOSIS — K21.9 GASTROESOPHAGEAL REFLUX DISEASE WITHOUT ESOPHAGITIS: ICD-10-CM

## 2024-07-22 DIAGNOSIS — K74.60 CIRRHOSIS OF LIVER WITHOUT ASCITES, UNSPECIFIED HEPATIC CIRRHOSIS TYPE: Primary | ICD-10-CM

## 2024-07-22 DIAGNOSIS — K21.9 GASTROESOPHAGEAL REFLUX DISEASE, UNSPECIFIED WHETHER ESOPHAGITIS PRESENT: ICD-10-CM

## 2024-07-22 DIAGNOSIS — D69.6 THROMBOCYTOPENIA: ICD-10-CM

## 2024-07-22 DIAGNOSIS — R16.0 HEPATOMEGALY: ICD-10-CM

## 2024-07-22 DIAGNOSIS — K58.0 IRRITABLE BOWEL SYNDROME WITH DIARRHEA: ICD-10-CM

## 2024-07-22 DIAGNOSIS — K75.81 LIVER CIRRHOSIS SECONDARY TO NASH: ICD-10-CM

## 2024-07-22 DIAGNOSIS — R19.7 DIARRHEA, UNSPECIFIED TYPE: ICD-10-CM

## 2024-07-22 PROCEDURE — 1160F RVW MEDS BY RX/DR IN RCRD: CPT | Performed by: NURSE PRACTITIONER

## 2024-07-22 PROCEDURE — 3078F DIAST BP <80 MM HG: CPT | Performed by: NURSE PRACTITIONER

## 2024-07-22 PROCEDURE — 1159F MED LIST DOCD IN RCRD: CPT | Performed by: NURSE PRACTITIONER

## 2024-07-22 PROCEDURE — 3075F SYST BP GE 130 - 139MM HG: CPT | Performed by: NURSE PRACTITIONER

## 2024-07-22 PROCEDURE — 99214 OFFICE O/P EST MOD 30 MIN: CPT | Performed by: NURSE PRACTITIONER

## 2024-07-22 RX ORDER — SEMAGLUTIDE 0.68 MG/ML
INJECTION, SOLUTION SUBCUTANEOUS
COMMUNITY

## 2024-07-22 RX ORDER — FAMOTIDINE 40 MG/1
40 TABLET, FILM COATED ORAL DAILY
Qty: 30 TABLET | Refills: 3 | Status: SHIPPED | OUTPATIENT
Start: 2024-07-22

## 2024-07-22 RX ORDER — GLIPIZIDE 10 MG/1
1 TABLET ORAL DAILY
COMMUNITY

## 2024-07-22 RX ORDER — SEMAGLUTIDE 1.34 MG/ML
INJECTION, SOLUTION SUBCUTANEOUS
COMMUNITY
Start: 2024-05-15

## 2024-07-22 NOTE — PATIENT INSTRUCTIONS
Food Choices for Gastroesophageal Reflux Disease, Adult  When you have gastroesophageal reflux disease (GERD), the foods you eat and your eating habits are very important. Choosing the right foods can help ease your discomfort. Think about working with a food expert (dietitian) to help you make good choices.  What are tips for following this plan?  Reading food labels  Look for foods that are low in saturated fat. Foods that may help with your symptoms include:  Foods that have less than 5% of daily value (DV) of fat.  Foods that have 0 grams of trans fat.  Cooking  Do not oh your food.  Cook your food by baking, steaming, grilling, or broiling. These are all methods that do not need a lot of fat for cooking.  To add flavor, try to use herbs that are low in spice and acidity.  Meal planning    Choose healthy foods that are low in fat, such as:  Fruits and vegetables.  Whole grains.  Low-fat dairy products.  Lean meats, fish, and poultry.  Eat small meals often instead of eating 3 large meals each day. Eat your meals slowly in a place where you are relaxed. Avoid bending over or lying down until 2-3 hours after eating.  Limit high-fat foods such as fatty meats or fried foods.  Limit your intake of fatty foods, such as oils, butter, and shortening.  Avoid the following as told by your doctor:  Foods that cause symptoms. These may be different for different people. Keep a food diary to keep track of foods that cause symptoms.  Alcohol.  Drinking a lot of liquid with meals.  Eating meals during the 2-3 hours before bed.  Lifestyle  Stay at a healthy weight. Ask your doctor what weight is healthy for you. If you need to lose weight, work with your doctor to do so safely.  Exercise for at least 30 minutes on 5 or more days each week, or as told by your doctor.  Wear loose-fitting clothes.  Do not smoke or use any products that contain nicotine or tobacco. If you need help quitting, ask your doctor.  Sleep with the head  of your bed higher than your feet. Use a wedge under the mattress or blocks under the bed frame to raise the head of the bed.  Chew sugar-free gum after meals.  What foods should eat?    Eat a healthy, well-balanced diet of fruits, vegetables, whole grains, low-fat dairy products, lean meats, fish, and poultry. Each person is different.  Foods that may cause symptoms in one person may not cause any symptoms in another person. Work with your doctor to find foods that are safe for you.  The items listed above may not be a complete list of what you can eat and drink. Contact a food expert for more options.  What foods should I avoid?  Limiting some of these foods may help in managing the symptoms of GERD. Everyone is different. Talk with a food expert or your doctor to help you find the exact foods to avoid, if any.  Fruits  Any fruits prepared with added fat. Any fruits that cause symptoms. For some people, this may include citrus fruits, such as oranges, grapefruit, pineapple, and bennett.  Vegetables  Deep-fried vegetables. French fries. Any vegetables prepared with added fat. Any vegetables that cause symptoms. For some people, this may include tomatoes and tomato products, chili peppers, onions and garlic, and horseradish.  Grains  Pastries or quick breads with added fat.  Meats and other proteins  High-fat meats, such as fatty beef or pork, hot dogs, ribs, ham, sausage, salami, and dominique. Fried meat or protein, including fried fish and fried chicken. Nuts and nut butters, in large amounts.  Dairy  Whole milk and chocolate milk. Sour cream. Cream. Ice cream. Cream cheese. Milkshakes.  Fats and oils  Butter. Margarine. Shortening. Ghee.  Beverages  Coffee and tea, with or without caffeine. Carbonated beverages. Sodas. Energy drinks. Fruit juice made with acidic fruits, such as orange or grapefruit. Tomato juice. Alcoholic drinks.  Sweets and desserts  Chocolate and cocoa. Donuts.  Seasonings and condiments  Pepper.  Peppermint and spearmint. Added salt. Any condiments, herbs, or seasonings that cause symptoms. For some people, this may include david, hot sauce, or vinegar-based salad dressings.  The items listed above may not be a complete list of what you should not eat and drink. Contact a food expert for more options.  Questions to ask your doctor  Diet and lifestyle changes are often the first steps that are taken to manage symptoms of GERD. If diet and lifestyle changes do not help, talk with your doctor about taking medicines.  Where to find more information  [employer]: aboutgerd.org  Summary  When you have GERD, food and lifestyle choices are very important in easing your symptoms.  Eat small meals often instead of 3 large meals a day. Eat your meals slowly and in a place where you are relaxed.  Avoid bending over or lying down until 2-3 hours after eating.  Limit high-fat foods such as fatty meats or fried foods.  This information is not intended to replace advice given to you by your health care provider. Make sure you discuss any questions you have with your health care provider.  Document Revised: 06/28/2021 Document Reviewed: 06/28/2021  Elsevier Patient Education © 2024 [employer]

## 2024-07-22 NOTE — PROGRESS NOTES
Chief Complaint   Heartburn (Nexium does not seem to help, does okay until its time to lay down, uses 3 pillows and bed elevated at night but doesn't seem to help. )    History of Present Illness       Airam Pool is a 63 y.o. 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  Patient continues on colestipol 1 to 2 g/day titrating as this works well for her diarrhea.  She continues Nexium 40 mg twice daily for continued reflux and continues lifestyle changes related to reflux.  Patient continues with breakthrough reflux especially at night.  Patient is using 3 pillows at night to prop up.  Patient has been on Mounjaro and Ozempic related to her diabetes and wishes not to come off of these medications as it is working well for her diabetes and A1c control despite the fact that this could be causing worsening gastroparesis and reflux.  We have reviewed recent ultrasound which displays fatty liver and splenomegaly.  Patient denies abdominal swelling, leg swelling, confusion, easily bleeding, fever, nausea, vomiting, weight loss, night sweats, melena, hematochezia, hematemesis.    US Abdomen Limited- 6/10/24 hepatic steatosis and splenomegaly    Most recent labs- 5/1/2024 see below    EGD: Review of the patient's most recent EGD performed by Dr. Fischer on 4/15/24 biopsies with intestinal metaplasia and active gastritis of the stomach.  Repeat 2 years.      EGD: Review of the patient's most recent EGD performed by Dr. Fischer on 04/28/23 medium size food in the stomach.  Intestinal metaplasia noted on biopsy of the stomach.  Repeat 1 year.     Endoscopy: Review of the patient's most recent EGD and colonoscopy performed by Dr. Fischer on 5/27/2021 deformity of the IC valve unable to intubate  terminal ileum possible Crohn's.  Single polyp removed from the ascending colon normal esophagus stomach and duodenum.  Pathology reveals tubular adenoma.  Repeat colonoscopy 2026.     CT scan of the abdomen and pelvis with contrast 5/27/2021 normal 6/8/2021 revealed hepatic steatosis, splenomegaly, gastroesophageal varices consistent with portal hypertension     Prometheus testing negative on 6/21/21     Fibroscan preformed on 05.27.2022 S3 F3    Results       Result Review :   The following data was reviewed by: ALEXIS Ramirez on 07/22/2024:    CMP          2/13/2024    13:46   CMP   Glucose 161    BUN 20    Creatinine 0.91    EGFR 71.5    Sodium 139    Potassium 4.7    Chloride 102    Calcium 9.3    Total Protein 6.7    Albumin 4.3    Globulin 2.4    Total Bilirubin 0.3    Alkaline Phosphatase 68    AST (SGOT) 12    ALT (SGPT) 9    Albumin/Globulin Ratio 1.8    BUN/Creatinine Ratio 22.0    Anion Gap 10.9      CBC          2/13/2024    13:46 5/1/2024    14:07   CBC   WBC 3.86  4.13    RBC 3.93  4.09    Hemoglobin 11.4  12.0    Hematocrit 35.7  37.7    MCV 90.8  92.2    MCH 29.0  29.3    MCHC 31.9  31.8    RDW 12.8  14.1    Platelets 95  93        Iron Profile   Iron   Date Value Ref Range Status   01/27/2022 42 37 - 145 mcg/dL Final     TIBC   Date Value Ref Range Status   01/27/2022 371 298 - 536 mcg/dL Final     Iron Saturation (TSAT)   Date Value Ref Range Status   01/27/2022 11 (L) 20 - 50 % Final     Transferrin   Date Value Ref Range Status   01/27/2022 249 200 - 360 mg/dL Final     Ferritin   Ferritin   Date Value Ref Range Status   01/27/2022 331.00 (H) 13.00 - 150.00 ng/mL Final     Liver Workup   A-1 Antitrypsin   Date Value Ref Range Status   01/27/2022 132 101 - 187 mg/dL Final     dsDNA   Date Value Ref Range Status   01/27/2022 Negative Negative Final     Expanded JENNIFER Screen   Date Value Ref Range Status   01/27/2022 Negative Negative Final     Smooth Muscle Ab   Date Value Ref Range Status    01/27/2022 10 0 - 19 Units Final     Comment:                      Negative                     0 - 19                   Weak positive               20 - 30                   Moderate to strong positive     >30   Actin Antibodies are found in 52-85% of patients with   autoimmune hepatitis or chronic active hepatitis and   in 22% of patients with primary biliary cirrhosis.     Ceruloplasmin   Date Value Ref Range Status   01/27/2022 26 19 - 39 mg/dL Final     Ferritin   Date Value Ref Range Status   01/27/2022 331.00 (H) 13.00 - 150.00 ng/mL Final     Immunofixation Result, Serum   Date Value Ref Range Status   03/12/2021 COMMENT NA Final     Comment:     No monoclonality detected.     IgA   Date Value Ref Range Status   03/12/2021 173 87 - 352 mg/dL Final   03/12/2021 173 87 - 352 mg/dL Final     Iron   Date Value Ref Range Status   01/27/2022 42 37 - 145 mcg/dL Final     TIBC   Date Value Ref Range Status   01/27/2022 371 298 - 536 mcg/dL Final     Iron Saturation (TSAT)   Date Value Ref Range Status   01/27/2022 11 (L) 20 - 50 % Final     Transferrin   Date Value Ref Range Status   01/27/2022 249 200 - 360 mg/dL Final     Mitochondrial Ab   Date Value Ref Range Status   01/27/2022 <20.0 0.0 - 20.0 Units Final     Comment:                                     Negative    0.0 - 20.0                                  Equivocal  20.1 - 24.9                                  Positive         >24.9  Mitochondrial (M2) Antibodies are found in 90-96% of  patients with primary biliary cirrhosis.     Protime   Date Value Ref Range Status   02/13/2024 13.0 11.8 - 14.9 Seconds Final     INR   Date Value Ref Range Status   02/13/2024 0.96 0.86 - 1.15 Final     ALPHA-FETOPROTEIN   Date Value Ref Range Status   02/13/2024 <2 0 - 8.3 ng/mL Final               Past Medical History       Past Medical History:   Diagnosis Date    Breast cancer     LEFT BREAST    Carotid artery calcification     pt is going to have endarectomy 8/24/21     Coronary artery disease 2020    Diabetes mellitus, type II     Fatty liver     GERD (gastroesophageal reflux disease)     Hernia Dont know    High cholesterol     Hypertension     Irritable bowel syndrome     Liver disease     FATTY LIVER       Past Surgical History:   Procedure Laterality Date    BACK SURGERY      BREAST BIOPSY      BREAST LUMPECTOMY      left    CAROTID ENDARTERECTOMY Left     CHOLECYSTECTOMY      COLONOSCOPY  05/2021    luis alberto    ENDOSCOPY N/A 01/31/2022    Procedure: ESOPHAGOGASTRODUODENOSCOPY WITH BX;  Surgeon: Star Fischer MD;  Location: [facility] ENDOSCOPY;  Service: Gastroenterology;  Laterality: N/A;  NORMAL EGD    ENDOSCOPY N/A 04/28/2023    Procedure: ESOPHAGOGASTRODUODENOSCOPY WITH BIOPSY;  Surgeon: Star Fischer MD;  Location: [facility] ENDOSCOPY;  Service: Gastroenterology;  Laterality: N/A;  RETAINED FOOD    ENDOSCOPY N/A 4/15/2024    Procedure: ESOPHAGOGASTRODUODENOSCOPY WITH BIOPSY;  Surgeon: Star Fischer MD;  Location: [facility] ENDOSCOPY;  Service: Gastroenterology;  Laterality: N/A;  NORMAL    SPINE SURGERY      TUBAL ABDOMINAL LIGATION      UPPER GASTROINTESTINAL ENDOSCOPY           Current Outpatient Medications:     amLODIPine (NORVASC) 10 MG tablet, Take 1 tablet by mouth Daily., Disp: , Rfl:     aspirin 81 MG EC tablet, Take 1 tablet by mouth Daily. Last dose Friday 1/28, Disp: , Rfl:     BD Pen Needle Awilda U/F 32G X 4 MM misc, INJECT using 1 FOUR TIMES DAILY before meals and nightly, Disp: , Rfl:     carvedilol (COREG) 6.25 MG tablet, Take 1 tablet by mouth 2 (Two) Times a Day With Meals., Disp: , Rfl:     colestipol (COLESTID) 1 g tablet, TAKE 2 TABLETS BY MOUTH EVERY NIGHT AT BEDTIME, Disp: 60 tablet, Rfl: 2    Continuous Blood Gluc Sensor (Dexcom G6 Sensor), apply 1 topically EVERY 10 days, Disp: , Rfl:     Continuous Blood Gluc Transmit (Dexcom G6 Transmitter) misc, See Admin Instructions., Disp: , Rfl:     DULoxetine (CYMBALTA) 20 MG capsule,  "Take 1 capsule by mouth Daily., Disp: , Rfl:     esomeprazole (nexIUM) 40 MG capsule, TAKE 1 capsule BY MOUTH TWICE DAILY, Disp: 60 capsule, Rfl: 5    gabapentin (NEURONTIN) 300 MG capsule, Take 2 capsules by mouth 3 (Three) Times a Day., Disp: , Rfl:     glipizide (GLUCOTROL) 10 MG tablet, Take 1 tablet by mouth Daily., Disp: , Rfl:     hydroCHLOROthiazide (HYDRODIURIL) 25 MG tablet, Take 1 tablet by mouth Daily., Disp: , Rfl:     HYDROcodone-acetaminophen (NORCO) 5-325 MG per tablet, hydrocodone 5 mg-acetaminophen 325 mg tablet  TAKE 1 TABLET BY MOUTH EVERY 8 TO 12 HOURS AS NEEDED FOR SEVERE PAIN. FILL DATE 05/12/2022, Disp: , Rfl:     Insulin Glargine, 2 Unit Dial, (Toujeo Max SoloStar) 300 UNIT/ML solution pen-injector injection, Inject 30 Units under the skin into the appropriate area as directed Daily., Disp: , Rfl:     Insulin Lispro, 1 Unit Dial, (HUMALOG) 100 UNIT/ML solution pen-injector, Inject 10 units twice daily with meals plus sliding scale., Disp: , Rfl:     Insulin Syringe-Needle U-100 (BD Veo Insulin Syringe U/F) 31G X 15/64\" 1 ML misc, USE TO INJECT INTO THE SKIN AS NEEDED, Disp: , Rfl:     lisinopril (PRINIVIL,ZESTRIL) 40 MG tablet, Take 1 tablet by mouth Daily., Disp: , Rfl:     meloxicam (MOBIC) 15 MG tablet, , Disp: , Rfl:     metFORMIN (GLUCOPHAGE) 1000 MG tablet, Take 1 tablet twice a day by oral route., Disp: , Rfl:     methocarbamol (ROBAXIN) 500 MG tablet, Take 1 tablet by mouth 4 (Four) Times a Day., Disp: , Rfl:     Ozempic, 1 MG/DOSE, 4 MG/3ML solution pen-injector, , Disp: , Rfl:     pravastatin (PRAVACHOL) 80 MG tablet, Take 1 tablet by mouth Daily., Disp: , Rfl:     Semaglutide,0.25 or 0.5MG/DOS, (Ozempic, 0.25 or 0.5 MG/DOSE,) 2 MG/3ML solution pen-injector, inject 0.25mg SUBCUTANEOUSLY EVERY 7 DAYS AS DIRECTED, Disp: , Rfl:     famotidine (Pepcid) 40 MG tablet, Take 1 tablet by mouth Daily., Disp: 30 tablet, Rfl: 3     No Known Allergies    Family History   Problem Relation Age of " Onset    Lung cancer Father     Cancer Father     Cancer Paternal Uncle     Colon cancer Neg Hx     Malig Hyperthermia Neg Hx         Social History     Social History Narrative    Not on file       Objective     Vital Signs:   /55 (BP Location: Left arm, Patient Position: Sitting, Cuff Size: Adult)   Pulse 64   Wt 86.8 kg (191 lb 4.8 oz)   SpO2 97%   BMI 32.84 kg/m²       Physical Exam  Constitutional:       Appearance: Normal appearance.   Pulmonary:      Effort: Pulmonary effort is normal.   Neurological:      General: No focal deficit present.      Mental Status: She is alert and oriented to person, place, and time.   Psychiatric:         Mood and Affect: Mood normal.         Behavior: Behavior normal.           Assessment & Plan          Assessment and Plan    Diagnoses and all orders for this visit:    1. Cirrhosis of liver without ascites, unspecified hepatic cirrhosis type (Primary)  -     CBC (No Diff); Future  -     Comprehensive Metabolic Panel; Future  -     Protime-INR; Future  -     AFP Tumor Marker; Future    2. Thrombocytopenia  -     CBC (No Diff); Future  -     Comprehensive Metabolic Panel; Future  -     Protime-INR; Future  -     AFP Tumor Marker; Future    3. Gastroesophageal reflux disease without esophagitis  -     CBC (No Diff); Future  -     Comprehensive Metabolic Panel; Future  -     Protime-INR; Future  -     AFP Tumor Marker; Future    4. 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 JEFF (nonalcoholic steatohepatitis)  -     CBC (No Diff); Future  -     Comprehensive Metabolic Panel; Future  -     Protime-INR; Future  -     AFP Tumor Marker; Future    6. Diarrhea, unspecified type  -     CBC (No Diff); Future  -     Comprehensive Metabolic Panel; Future  -     Protime-INR; Future  -     AFP Tumor Marker; Future    7. Hepatomegaly  -     CBC (No Diff);  Future  -     Comprehensive Metabolic Panel; Future  -     Protime-INR; Future  -     AFP Tumor Marker; Future    8. Irritable bowel syndrome with diarrhea    9. Gastroesophageal reflux disease, unspecified whether esophagitis present    Other orders  -     famotidine (Pepcid) 40 MG tablet; Take 1 tablet by mouth Daily.  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  Patient continues on colestipol 1 to 2 g/day titrating as this works well for her diarrhea.  She continues Nexium 40 mg twice daily for continued reflux and continues lifestyle changes related to reflux.  Patient continues with breakthrough reflux especially at night.  I will add Pepcid 40 mg at night to see if this improves her reflux.  We have discussed Voquezna as an option but patient does not feel like she could afford the medication.  Avoid NSAIDs.  I have ordered repeat labs and ultrasound related to the patient's Jeff related cirrhosis.  Patient will follow-up in the office in 6 months.  Patient agreeable to this plan will call with any questions or concerns.          Follow Up       Follow Up   Return in about 6 months (around 1/22/2025).  Patient was given instructions and counseling regarding her condition or for health maintenance advice. Please see specific information pulled into the AVS if appropriate.

## 2024-08-16 DIAGNOSIS — K21.9 GASTROESOPHAGEAL REFLUX DISEASE WITHOUT ESOPHAGITIS: Primary | ICD-10-CM

## 2024-08-16 RX ORDER — ESOMEPRAZOLE MAGNESIUM 40 MG/1
40 CAPSULE, DELAYED RELEASE ORAL 2 TIMES DAILY
Qty: 60 CAPSULE | Refills: 5 | Status: SHIPPED | OUTPATIENT
Start: 2024-08-16

## 2024-10-04 ENCOUNTER — TRANSCRIBE ORDERS (OUTPATIENT)
Dept: ADMINISTRATIVE | Facility: HOSPITAL | Age: 64
End: 2024-10-04
Payer: MEDICARE

## 2024-10-04 ENCOUNTER — LAB (OUTPATIENT)
Dept: LAB | Facility: HOSPITAL | Age: 64
End: 2024-10-04
Payer: MEDICARE

## 2024-10-04 DIAGNOSIS — R16.0 HEPATOMEGALY: ICD-10-CM

## 2024-10-04 DIAGNOSIS — I10 ESSENTIAL HYPERTENSION, MALIGNANT: ICD-10-CM

## 2024-10-04 DIAGNOSIS — K74.60 CIRRHOSIS OF LIVER WITHOUT ASCITES, UNSPECIFIED HEPATIC CIRRHOSIS TYPE: ICD-10-CM

## 2024-10-04 DIAGNOSIS — K21.9 GASTROESOPHAGEAL REFLUX DISEASE WITHOUT ESOPHAGITIS: ICD-10-CM

## 2024-10-04 DIAGNOSIS — R19.7 DIARRHEA, UNSPECIFIED TYPE: ICD-10-CM

## 2024-10-04 DIAGNOSIS — K75.81 LIVER CIRRHOSIS SECONDARY TO NASH: ICD-10-CM

## 2024-10-04 DIAGNOSIS — K74.60 LIVER CIRRHOSIS SECONDARY TO NASH: ICD-10-CM

## 2024-10-04 DIAGNOSIS — E78.5 HYPERLIPIDEMIA, UNSPECIFIED HYPERLIPIDEMIA TYPE: ICD-10-CM

## 2024-10-04 DIAGNOSIS — K75.81 NASH (NONALCOHOLIC STEATOHEPATITIS): ICD-10-CM

## 2024-10-04 DIAGNOSIS — D69.6 THROMBOCYTOPENIA: ICD-10-CM

## 2024-10-04 LAB
ALBUMIN SERPL-MCNC: 4.3 G/DL (ref 3.5–5.2)
ALBUMIN/GLOB SERPL: 2 G/DL
ALP SERPL-CCNC: 56 U/L (ref 39–117)
ALPHA-FETOPROTEIN: <2 NG/ML (ref 0–8.3)
ALT SERPL W P-5'-P-CCNC: 14 U/L (ref 1–33)
ANION GAP SERPL CALCULATED.3IONS-SCNC: 9 MMOL/L (ref 5–15)
AST SERPL-CCNC: 16 U/L (ref 1–32)
BASOPHILS # BLD AUTO: 0.04 10*3/MM3 (ref 0–0.2)
BASOPHILS NFR BLD AUTO: 0.9 % (ref 0–1.5)
BILIRUB SERPL-MCNC: 0.2 MG/DL (ref 0–1.2)
BUN SERPL-MCNC: 23 MG/DL (ref 8–23)
BUN/CREAT SERPL: 22.1 (ref 7–25)
CALCIUM SPEC-SCNC: 9.6 MG/DL (ref 8.6–10.5)
CHLORIDE SERPL-SCNC: 105 MMOL/L (ref 98–107)
CHOLEST SERPL-MCNC: 106 MG/DL (ref 0–200)
CO2 SERPL-SCNC: 26 MMOL/L (ref 22–29)
CREAT SERPL-MCNC: 1.04 MG/DL (ref 0.57–1)
DEPRECATED RDW RBC AUTO: 47.9 FL (ref 37–54)
EGFRCR SERPLBLD CKD-EPI 2021: 60.9 ML/MIN/1.73
EOSINOPHIL # BLD AUTO: 0.1 10*3/MM3 (ref 0–0.4)
EOSINOPHIL NFR BLD AUTO: 2.2 % (ref 0.3–6.2)
ERYTHROCYTE [DISTWIDTH] IN BLOOD BY AUTOMATED COUNT: 14.1 % (ref 12.3–15.4)
GLOBULIN UR ELPH-MCNC: 2.2 GM/DL
GLUCOSE SERPL-MCNC: 109 MG/DL (ref 65–99)
HCT VFR BLD AUTO: 34.9 % (ref 34–46.6)
HDLC SERPL-MCNC: 34 MG/DL (ref 40–60)
HGB BLD-MCNC: 11.9 G/DL (ref 12–15.9)
IMM GRANULOCYTES # BLD AUTO: 0.01 10*3/MM3 (ref 0–0.05)
IMM GRANULOCYTES NFR BLD AUTO: 0.2 % (ref 0–0.5)
INR PPP: 0.98 (ref 0.86–1.15)
LDLC SERPL CALC-MCNC: 46 MG/DL (ref 0–100)
LDLC/HDLC SERPL: 1.22 {RATIO}
LYMPHOCYTES # BLD AUTO: 1.5 10*3/MM3 (ref 0.7–3.1)
LYMPHOCYTES NFR BLD AUTO: 32.7 % (ref 19.6–45.3)
MCH RBC QN AUTO: 31.6 PG (ref 26.6–33)
MCHC RBC AUTO-ENTMCNC: 34.1 G/DL (ref 31.5–35.7)
MCV RBC AUTO: 92.8 FL (ref 79–97)
MONOCYTES # BLD AUTO: 0.33 10*3/MM3 (ref 0.1–0.9)
MONOCYTES NFR BLD AUTO: 7.2 % (ref 5–12)
NEUTROPHILS NFR BLD AUTO: 2.61 10*3/MM3 (ref 1.7–7)
NEUTROPHILS NFR BLD AUTO: 56.8 % (ref 42.7–76)
NRBC BLD AUTO-RTO: 0 /100 WBC (ref 0–0.2)
PLATELET # BLD AUTO: 95 10*3/MM3 (ref 140–450)
PMV BLD AUTO: 10.2 FL (ref 6–12)
POTASSIUM SERPL-SCNC: 4.8 MMOL/L (ref 3.5–5.2)
PROT SERPL-MCNC: 6.5 G/DL (ref 6–8.5)
PROTHROMBIN TIME: 13.2 SECONDS (ref 11.8–14.9)
RBC # BLD AUTO: 3.76 10*6/MM3 (ref 3.77–5.28)
SODIUM SERPL-SCNC: 140 MMOL/L (ref 136–145)
TRIGL SERPL-MCNC: 153 MG/DL (ref 0–150)
VLDLC SERPL-MCNC: 26 MG/DL (ref 5–40)
WBC NRBC COR # BLD AUTO: 4.59 10*3/MM3 (ref 3.4–10.8)

## 2024-10-04 PROCEDURE — 80053 COMPREHEN METABOLIC PANEL: CPT

## 2024-10-04 PROCEDURE — 36415 COLL VENOUS BLD VENIPUNCTURE: CPT

## 2024-10-04 PROCEDURE — 82105 ALPHA-FETOPROTEIN SERUM: CPT

## 2024-10-04 PROCEDURE — 85610 PROTHROMBIN TIME: CPT

## 2024-10-04 PROCEDURE — 85025 COMPLETE CBC W/AUTO DIFF WBC: CPT

## 2024-10-04 PROCEDURE — 80061 LIPID PANEL: CPT

## 2024-10-10 ENCOUNTER — TELEPHONE (OUTPATIENT)
Dept: GASTROENTEROLOGY | Facility: CLINIC | Age: 64
End: 2024-10-10
Payer: MEDICARE

## 2024-10-10 NOTE — TELEPHONE ENCOUNTER
----- Message from Sarah Ramsey sent at 10/10/2024  8:18 AM EDT -----  Glucose 109 when labs are drawn.  Mildly elevated creatinine level and ensure the patient is hydrating well.  Liver enzymes are normal.  Alpha-fetoprotein normal PT/INR normal.  Hemoglobin slightly low at 11.9 would recommend repeating CBC in 3 months.  Platelets stable at 95.

## 2024-11-04 RX ORDER — MONTELUKAST SODIUM 4 MG/1
2 TABLET, CHEWABLE ORAL
Qty: 60 TABLET | Refills: 2 | Status: SHIPPED | OUTPATIENT
Start: 2024-11-04

## 2024-11-04 NOTE — TELEPHONE ENCOUNTER
Received fax from [facility] pharmacy for Colestiopl 1 gm refill.    Last OV 07/2024  Next OV 01/2025

## 2024-11-05 RX ORDER — FAMOTIDINE 40 MG/1
40 TABLET, FILM COATED ORAL DAILY
Qty: 90 TABLET | Refills: 2 | Status: SHIPPED | OUTPATIENT
Start: 2024-11-05

## 2024-11-05 NOTE — TELEPHONE ENCOUNTER
Patient LVM stating she is out of famotidine and has not received a refill. Patients last RX was sent in July and it was 30 with 3 refills. Patient will need a new rx sent to [facility].

## 2024-11-11 ENCOUNTER — TELEPHONE (OUTPATIENT)
Dept: GASTROENTEROLOGY | Facility: CLINIC | Age: 64
End: 2024-11-11
Payer: MEDICARE

## 2024-11-11 NOTE — TELEPHONE ENCOUNTER
VOICEMAIL LEFT FOR PT TO CONTACT THE OFFICE TO DISCUSS SCHEDULING REPEAT US    PT CAN ALSO CONTACT CENTRAL SCHEDULING .[phone] OPT 3

## 2024-12-05 ENCOUNTER — LAB (OUTPATIENT)
Dept: LAB | Facility: HOSPITAL | Age: 64
End: 2024-12-05
Payer: MEDICARE

## 2024-12-05 DIAGNOSIS — K74.60 CIRRHOSIS OF LIVER WITHOUT ASCITES, UNSPECIFIED HEPATIC CIRRHOSIS TYPE: ICD-10-CM

## 2024-12-05 PROCEDURE — 36415 COLL VENOUS BLD VENIPUNCTURE: CPT

## 2024-12-05 PROCEDURE — 85025 COMPLETE CBC W/AUTO DIFF WBC: CPT

## 2024-12-06 LAB
BASOPHILS # BLD AUTO: 0.05 10*3/MM3 (ref 0–0.2)
BASOPHILS NFR BLD AUTO: 0.8 % (ref 0–1.5)
DEPRECATED RDW RBC AUTO: 45 FL (ref 37–54)
EOSINOPHIL # BLD AUTO: 0.11 10*3/MM3 (ref 0–0.4)
EOSINOPHIL NFR BLD AUTO: 1.8 % (ref 0.3–6.2)
ERYTHROCYTE [DISTWIDTH] IN BLOOD BY AUTOMATED COUNT: 13.2 % (ref 12.3–15.4)
HCT VFR BLD AUTO: 37.1 % (ref 34–46.6)
HGB BLD-MCNC: 12.7 G/DL (ref 12–15.9)
IMM GRANULOCYTES # BLD AUTO: 0.02 10*3/MM3 (ref 0–0.05)
IMM GRANULOCYTES NFR BLD AUTO: 0.3 % (ref 0–0.5)
LYMPHOCYTES # BLD AUTO: 1.51 10*3/MM3 (ref 0.7–3.1)
LYMPHOCYTES NFR BLD AUTO: 24.8 % (ref 19.6–45.3)
MCH RBC QN AUTO: 32 PG (ref 26.6–33)
MCHC RBC AUTO-ENTMCNC: 34.2 G/DL (ref 31.5–35.7)
MCV RBC AUTO: 93.5 FL (ref 79–97)
MONOCYTES # BLD AUTO: 0.45 10*3/MM3 (ref 0.1–0.9)
MONOCYTES NFR BLD AUTO: 7.4 % (ref 5–12)
NEUTROPHILS NFR BLD AUTO: 3.95 10*3/MM3 (ref 1.7–7)
NEUTROPHILS NFR BLD AUTO: 64.9 % (ref 42.7–76)
NRBC BLD AUTO-RTO: 0 /100 WBC (ref 0–0.2)
PLATELET # BLD AUTO: 113 10*3/MM3 (ref 140–450)
PMV BLD AUTO: 9.9 FL (ref 6–12)
RBC # BLD AUTO: 3.97 10*6/MM3 (ref 3.77–5.28)
WBC NRBC COR # BLD AUTO: 6.09 10*3/MM3 (ref 3.4–10.8)

## 2024-12-11 ENCOUNTER — HOSPITAL ENCOUNTER (OUTPATIENT)
Dept: ULTRASOUND IMAGING | Facility: HOSPITAL | Age: 64
Discharge: HOME | End: 2024-12-11
Admitting: NURSE PRACTITIONER
Payer: MEDICARE

## 2024-12-11 DIAGNOSIS — K74.60 CIRRHOSIS OF LIVER WITHOUT ASCITES, UNSPECIFIED HEPATIC CIRRHOSIS TYPE: ICD-10-CM

## 2024-12-11 PROCEDURE — 76705 ECHO EXAM OF ABDOMEN: CPT

## 2024-12-13 ENCOUNTER — TELEPHONE (OUTPATIENT)
Dept: GASTROENTEROLOGY | Facility: CLINIC | Age: 64
End: 2024-12-13
Payer: MEDICARE

## 2024-12-13 DIAGNOSIS — K74.60 CIRRHOSIS OF LIVER WITHOUT ASCITES, UNSPECIFIED HEPATIC CIRRHOSIS TYPE: Primary | ICD-10-CM

## 2024-12-13 DIAGNOSIS — R16.0 HEPATOMEGALY: ICD-10-CM

## 2024-12-13 DIAGNOSIS — K76.9 LIVER LESION: ICD-10-CM

## 2024-12-13 DIAGNOSIS — K74.60 LIVER CIRRHOSIS SECONDARY TO NASH: ICD-10-CM

## 2024-12-13 DIAGNOSIS — K75.81 LIVER CIRRHOSIS SECONDARY TO NASH: ICD-10-CM

## 2024-12-13 NOTE — TELEPHONE ENCOUNTER
----- Message from Sarah Ramsey sent at 12/13/2024 10:04 AM EST -----  Cirrhosis of the liver.  There are 2 new liver lesions noted not well-visualized on ultrasound would like to proceed with stat MRI.  Would recommend repeat labs CBC, CMP, AFP and PT/INR.

## 2024-12-13 NOTE — TELEPHONE ENCOUNTER
Phone call went straight to . Left  to call office today.   MRI and lab orders placed.  Received PA from insurance already.  seema

## 2024-12-18 ENCOUNTER — HOSPITAL ENCOUNTER (OUTPATIENT)
Dept: MRI IMAGING | Facility: HOSPITAL | Age: 64
Discharge: HOME | End: 2024-12-18
Admitting: NURSE PRACTITIONER
Payer: MEDICARE

## 2024-12-18 DIAGNOSIS — K74.60 CIRRHOSIS OF LIVER WITHOUT ASCITES, UNSPECIFIED HEPATIC CIRRHOSIS TYPE: ICD-10-CM

## 2024-12-18 DIAGNOSIS — K74.60 LIVER CIRRHOSIS SECONDARY TO NASH: ICD-10-CM

## 2024-12-18 DIAGNOSIS — K75.81 LIVER CIRRHOSIS SECONDARY TO NASH: ICD-10-CM

## 2024-12-18 DIAGNOSIS — R16.0 HEPATOMEGALY: ICD-10-CM

## 2024-12-18 DIAGNOSIS — K76.9 LIVER LESION: ICD-10-CM

## 2024-12-18 PROCEDURE — 25510000002 GADOBENATE DIMEGLUMINE 529 MG/ML SOLUTION: Performed by: NURSE PRACTITIONER

## 2024-12-18 PROCEDURE — 74183 MRI ABD W/O CNTR FLWD CNTR: CPT

## 2024-12-18 PROCEDURE — A9577 INJ MULTIHANCE: HCPCS | Performed by: NURSE PRACTITIONER

## 2024-12-18 RX ADMIN — GADOBENATE DIMEGLUMINE 18 ML: 529 INJECTION, SOLUTION INTRAVENOUS at 18:36

## 2024-12-19 ENCOUNTER — TELEPHONE (OUTPATIENT)
Dept: GASTROENTEROLOGY | Facility: CLINIC | Age: 64
End: 2024-12-19
Payer: MEDICARE

## 2024-12-19 DIAGNOSIS — K76.9 LIVER LESION: ICD-10-CM

## 2024-12-19 DIAGNOSIS — K74.60 CIRRHOSIS OF LIVER WITHOUT ASCITES, UNSPECIFIED HEPATIC CIRRHOSIS TYPE: Primary | ICD-10-CM

## 2024-12-19 DIAGNOSIS — R16.0 HEPATOMEGALY: ICD-10-CM

## 2024-12-19 DIAGNOSIS — K74.60 LIVER CIRRHOSIS SECONDARY TO NASH: ICD-10-CM

## 2024-12-19 DIAGNOSIS — K75.81 LIVER CIRRHOSIS SECONDARY TO NASH: ICD-10-CM

## 2024-12-19 NOTE — TELEPHONE ENCOUNTER
----- Message from Sarah Ramsey sent at 12/19/2024 12:28 PM EST -----  No hypervascular washout liver lesions to suggest hepatocellular carcinoma.  Would recommend repeat MRI in 6 months.

## 2025-01-13 ENCOUNTER — APPOINTMENT (OUTPATIENT)
Dept: GENERAL RADIOLOGY | Facility: HOSPITAL | Age: 65
End: 2025-01-13
Payer: MEDICARE

## 2025-01-13 ENCOUNTER — HOSPITAL ENCOUNTER (EMERGENCY)
Facility: HOSPITAL | Age: 65
Discharge: HOME | End: 2025-01-13
Attending: EMERGENCY MEDICINE | Admitting: EMERGENCY MEDICINE
Payer: MEDICARE

## 2025-01-13 VITALS
SYSTOLIC BLOOD PRESSURE: 131 MMHG | DIASTOLIC BLOOD PRESSURE: 70 MMHG | HEIGHT: 64 IN | HEART RATE: 73 BPM | BODY MASS INDEX: 30.73 KG/M2 | TEMPERATURE: 98 F | OXYGEN SATURATION: 98 % | RESPIRATION RATE: 16 BRPM | WEIGHT: 180 LBS

## 2025-01-13 DIAGNOSIS — R11.2 NAUSEA AND VOMITING, UNSPECIFIED VOMITING TYPE: Primary | ICD-10-CM

## 2025-01-13 DIAGNOSIS — E83.42 HYPOMAGNESEMIA: ICD-10-CM

## 2025-01-13 LAB
ALBUMIN SERPL-MCNC: 4.5 G/DL (ref 3.5–5.2)
ALBUMIN/GLOB SERPL: 1.7 G/DL
ALP SERPL-CCNC: 60 U/L (ref 39–117)
ALT SERPL W P-5'-P-CCNC: 15 U/L (ref 1–33)
ANION GAP SERPL CALCULATED.3IONS-SCNC: 10.8 MMOL/L (ref 5–15)
AST SERPL-CCNC: 20 U/L (ref 1–32)
BASOPHILS # BLD AUTO: 0.03 10*3/MM3 (ref 0–0.2)
BASOPHILS NFR BLD AUTO: 0.5 % (ref 0–1.5)
BILIRUB SERPL-MCNC: 0.5 MG/DL (ref 0–1.2)
BILIRUB UR QL STRIP: NEGATIVE
BUN SERPL-MCNC: 14 MG/DL (ref 8–23)
BUN/CREAT SERPL: 13.5 (ref 7–25)
CALCIUM SPEC-SCNC: 10.2 MG/DL (ref 8.6–10.5)
CHLORIDE SERPL-SCNC: 100 MMOL/L (ref 98–107)
CLARITY UR: CLEAR
CO2 SERPL-SCNC: 27.2 MMOL/L (ref 22–29)
COLOR UR: YELLOW
CREAT SERPL-MCNC: 1.04 MG/DL (ref 0.57–1)
D-LACTATE SERPL-SCNC: 1.4 MMOL/L (ref 0.5–2)
DEPRECATED RDW RBC AUTO: 46 FL (ref 37–54)
EGFRCR SERPLBLD CKD-EPI 2021: 60.5 ML/MIN/1.73
EOSINOPHIL # BLD AUTO: 0.06 10*3/MM3 (ref 0–0.4)
EOSINOPHIL NFR BLD AUTO: 1 % (ref 0.3–6.2)
ERYTHROCYTE [DISTWIDTH] IN BLOOD BY AUTOMATED COUNT: 13.2 % (ref 12.3–15.4)
GLOBULIN UR ELPH-MCNC: 2.6 GM/DL
GLUCOSE SERPL-MCNC: 143 MG/DL (ref 65–99)
GLUCOSE UR STRIP-MCNC: NEGATIVE MG/DL
HCT VFR BLD AUTO: 41.6 % (ref 34–46.6)
HGB BLD-MCNC: 13.6 G/DL (ref 12–15.9)
HGB UR QL STRIP.AUTO: NEGATIVE
HOLD SPECIMEN: NORMAL
HOLD SPECIMEN: NORMAL
IMM GRANULOCYTES # BLD AUTO: 0.03 10*3/MM3 (ref 0–0.05)
IMM GRANULOCYTES NFR BLD AUTO: 0.5 % (ref 0–0.5)
KETONES UR QL STRIP: ABNORMAL
LEUKOCYTE ESTERASE UR QL STRIP.AUTO: NEGATIVE
LIPASE SERPL-CCNC: 45 U/L (ref 13–60)
LYMPHOCYTES # BLD AUTO: 0.99 10*3/MM3 (ref 0.7–3.1)
LYMPHOCYTES NFR BLD AUTO: 15.8 % (ref 19.6–45.3)
MAGNESIUM SERPL-MCNC: 1.2 MG/DL (ref 1.6–2.4)
MCH RBC QN AUTO: 30.8 PG (ref 26.6–33)
MCHC RBC AUTO-ENTMCNC: 32.7 G/DL (ref 31.5–35.7)
MCV RBC AUTO: 94.3 FL (ref 79–97)
MONOCYTES # BLD AUTO: 0.27 10*3/MM3 (ref 0.1–0.9)
MONOCYTES NFR BLD AUTO: 4.3 % (ref 5–12)
NEUTROPHILS NFR BLD AUTO: 4.88 10*3/MM3 (ref 1.7–7)
NEUTROPHILS NFR BLD AUTO: 77.9 % (ref 42.7–76)
NITRITE UR QL STRIP: NEGATIVE
NRBC BLD AUTO-RTO: 0 /100 WBC (ref 0–0.2)
PH UR STRIP.AUTO: <=5 [PH] (ref 5–8)
PLATELET # BLD AUTO: 113 10*3/MM3 (ref 140–450)
PMV BLD AUTO: 9.7 FL (ref 6–12)
POTASSIUM SERPL-SCNC: 5.1 MMOL/L (ref 3.5–5.2)
PROT SERPL-MCNC: 7.1 G/DL (ref 6–8.5)
PROT UR QL STRIP: ABNORMAL
QT INTERVAL: 428 MS
QTC INTERVAL: 422 MS
RBC # BLD AUTO: 4.41 10*6/MM3 (ref 3.77–5.28)
SODIUM SERPL-SCNC: 138 MMOL/L (ref 136–145)
SP GR UR STRIP: 1.02 (ref 1–1.03)
UROBILINOGEN UR QL STRIP: ABNORMAL
WBC NRBC COR # BLD AUTO: 6.26 10*3/MM3 (ref 3.4–10.8)
WHOLE BLOOD HOLD COAG: NORMAL
WHOLE BLOOD HOLD SPECIMEN: NORMAL

## 2025-01-13 PROCEDURE — 85025 COMPLETE CBC W/AUTO DIFF WBC: CPT

## 2025-01-13 PROCEDURE — 96375 TX/PRO/DX INJ NEW DRUG ADDON: CPT

## 2025-01-13 PROCEDURE — 83690 ASSAY OF LIPASE: CPT

## 2025-01-13 PROCEDURE — 99284 EMERGENCY DEPT VISIT MOD MDM: CPT

## 2025-01-13 PROCEDURE — 96374 THER/PROPH/DIAG INJ IV PUSH: CPT

## 2025-01-13 PROCEDURE — 93005 ELECTROCARDIOGRAM TRACING: CPT | Performed by: EMERGENCY MEDICINE

## 2025-01-13 PROCEDURE — 83735 ASSAY OF MAGNESIUM: CPT

## 2025-01-13 PROCEDURE — 93005 ELECTROCARDIOGRAM TRACING: CPT

## 2025-01-13 PROCEDURE — 71045 X-RAY EXAM CHEST 1 VIEW: CPT

## 2025-01-13 PROCEDURE — 83605 ASSAY OF LACTIC ACID: CPT

## 2025-01-13 PROCEDURE — 80053 COMPREHEN METABOLIC PANEL: CPT

## 2025-01-13 PROCEDURE — 36415 COLL VENOUS BLD VENIPUNCTURE: CPT

## 2025-01-13 PROCEDURE — 25010000002 ONDANSETRON PER 1 MG

## 2025-01-13 PROCEDURE — 81003 URINALYSIS AUTO W/O SCOPE: CPT | Performed by: EMERGENCY MEDICINE

## 2025-01-13 RX ORDER — ONDANSETRON 2 MG/ML
4 INJECTION INTRAMUSCULAR; INTRAVENOUS ONCE
Status: COMPLETED | OUTPATIENT
Start: 2025-01-13 | End: 2025-01-13

## 2025-01-13 RX ORDER — SODIUM CHLORIDE 0.9 % (FLUSH) 0.9 %
10 SYRINGE (ML) INJECTION AS NEEDED
Status: DISCONTINUED | OUTPATIENT
Start: 2025-01-13 | End: 2025-01-13 | Stop reason: HOSPADM

## 2025-01-13 RX ORDER — ASPIRIN 81 MG/1
324 TABLET, CHEWABLE ORAL ONCE
Status: DISCONTINUED | OUTPATIENT
Start: 2025-01-13 | End: 2025-01-13

## 2025-01-13 RX ORDER — FAMOTIDINE 10 MG/ML
20 INJECTION, SOLUTION INTRAVENOUS ONCE
Status: COMPLETED | OUTPATIENT
Start: 2025-01-13 | End: 2025-01-13

## 2025-01-13 RX ORDER — ONDANSETRON 4 MG/1
4 TABLET, ORALLY DISINTEGRATING ORAL 4 TIMES DAILY PRN
Qty: 30 TABLET | Refills: 0 | Status: SHIPPED | OUTPATIENT
Start: 2025-01-13

## 2025-01-13 RX ADMIN — ONDANSETRON 4 MG: 2 INJECTION INTRAMUSCULAR; INTRAVENOUS at 18:00

## 2025-01-13 RX ADMIN — FAMOTIDINE 20 MG: 10 INJECTION INTRAVENOUS at 17:59

## 2025-01-13 RX ADMIN — Medication 400 MG: at 19:36

## 2025-01-13 NOTE — ED PROVIDER NOTES
Time: 4:43 PM EST  Date of encounter:  1/13/2025  Independent Historian/Clinical History and Information was obtained by:   Patient    History is limited by: N/A    Chief Complaint   Patient presents with    Abdominal Pain    Nausea    Vomiting         History of Present Illness:  Patient is a 64 y.o. year old female who presents to the emergency department for evaluation of vomiting.  Patient states that she has been on Ozempic she developed some vomiting last night that worsened into today.  Patient states she was vomiting so hard that she has her developed some epigastric pain and chest pain.(Bailey Seaver, APRN, FNP-C) patient is denying any chest pain or shortness of breath at this time.  Patient denies any hematemesis.  Patient has not had any fever.  Patient denies diarrhea.    Patient Care Team  Primary Care Provider: Ольга Bhagat APRN    Past Medical History:     No Known Allergies  Past Medical History:   Diagnosis Date    Breast cancer     LEFT BREAST    Carotid artery calcification     pt is going to have endarectomy 8/24/21    Coronary artery disease 2020    Diabetes mellitus, type II     Fatty liver     GERD (gastroesophageal reflux disease)     Hernia Dont know    High cholesterol     Hypertension     Irritable bowel syndrome     Liver disease     FATTY LIVER     Past Surgical History:   Procedure Laterality Date    BACK SURGERY      BREAST BIOPSY      BREAST LUMPECTOMY      left    CAROTID ENDARTERECTOMY Left     CHOLECYSTECTOMY      COLONOSCOPY  05/2021    luis alberto    ENDOSCOPY N/A 01/31/2022    Procedure: ESOPHAGOGASTRODUODENOSCOPY WITH BX;  Surgeon: Star Fischer MD;  Location: [facility] ENDOSCOPY;  Service: Gastroenterology;  Laterality: N/A;  NORMAL EGD    ENDOSCOPY N/A 04/28/2023    Procedure: ESOPHAGOGASTRODUODENOSCOPY WITH BIOPSY;  Surgeon: Star Fischer MD;  Location: [facility] ENDOSCOPY;  Service: Gastroenterology;  Laterality: N/A;  RETAINED FOOD    ENDOSCOPY N/A 4/15/2024     Procedure: ESOPHAGOGASTRODUODENOSCOPY WITH BIOPSY;  Surgeon: Star Fischer MD;  Location: [facility] ENDOSCOPY;  Service: Gastroenterology;  Laterality: N/A;  NORMAL    SPINE SURGERY      TUBAL ABDOMINAL LIGATION      UPPER GASTROINTESTINAL ENDOSCOPY       Family History   Problem Relation Age of Onset    Lung cancer Father     Cancer Father     Cancer Paternal Uncle     Colon cancer Neg Hx     Malig Hyperthermia Neg Hx        Home Medications:  Prior to Admission medications    Medication Sig Start Date End Date Taking? Authorizing Provider   amLODIPine (NORVASC) 10 MG tablet Take 1 tablet by mouth Daily.    Ev West MD   aspirin 81 MG EC tablet Take 1 tablet by mouth Daily. Last dose Friday 1/28    Ev West MD   BD Pen Needle Awilda U/F 32G X 4 MM misc INJECT using 1 FOUR TIMES DAILY before meals and nightly 5/11/22   Ev West MD   carvedilol (COREG) 6.25 MG tablet Take 1 tablet by mouth 2 (Two) Times a Day With Meals.    Ev West MD   colestipol (COLESTID) 1 g tablet Take 2 tablets by mouth every night at bedtime. 11/4/24   Sarah Ramsey APRN   Continuous Blood Gluc Sensor (Dexcom G6 Sensor) apply 1 topically EVERY 10 days 5/5/22   Ev West MD   Continuous Blood Gluc Transmit (Dexcom G6 Transmitter) misc See Admin Instructions. 5/5/22   Ev West MD   DULoxetine (CYMBALTA) 20 MG capsule Take 1 capsule by mouth Daily.    Ev West MD   esomeprazole (nexIUM) 40 MG capsule TAKE 1 capsule BY MOUTH TWICE DAILY 8/16/24   Sarah Ramsey APRN   famotidine (Pepcid) 40 MG tablet Take 1 tablet by mouth Daily. 11/5/24   Sarah Ramsey APRN   gabapentin (NEURONTIN) 300 MG capsule Take 2 capsules by mouth 3 (Three) Times a Day. 8/17/23   Ev West MD   glipizide (GLUCOTROL) 10 MG tablet Take 1 tablet by mouth Daily.    Ev West MD   hydroCHLOROthiazide (HYDRODIURIL) 25 MG tablet Take 1 tablet by mouth Daily.  "23  Ev West MD   HYDROcodone-acetaminophen (NORCO) 5-325 MG per tablet hydrocodone 5 mg-acetaminophen 325 mg tablet   TAKE 1 TABLET BY MOUTH EVERY 8 TO 12 HOURS AS NEEDED FOR SEVERE PAIN. FILL DATE 2022    Ev West MD   Insulin Glargine, 2 Unit Dial, (Toujeo Max SoloStar) 300 UNIT/ML solution pen-injector injection Inject 30 Units under the skin into the appropriate area as directed Daily.    Ev West MD   Insulin Lispro, 1 Unit Dial, (HUMALOG) 100 UNIT/ML solution pen-injector Inject 10 units twice daily with meals plus sliding scale. 23   Ev West MD   Insulin Syringe-Needle U-100 (BD Veo Insulin Syringe U/F) 31G X 15/64\" 1 ML misc USE TO INJECT INTO THE SKIN AS NEEDED 21   Ev West MD   lisinopril (PRINIVIL,ZESTRIL) 40 MG tablet Take 1 tablet by mouth Daily. 23  Ev West MD   meloxicam (MOBIC) 15 MG tablet  23   Ev West MD   metFORMIN (GLUCOPHAGE) 1000 MG tablet Take 1 tablet twice a day by oral route.    Ev West MD   methocarbamol (ROBAXIN) 500 MG tablet Take 1 tablet by mouth 4 (Four) Times a Day. 23   Ev West MD   Ozempic, 1 MG/DOSE, 4 MG/3ML solution pen-injector  5/15/24   Ev West MD   pravastatin (PRAVACHOL) 80 MG tablet Take 1 tablet by mouth Daily. 23   Ev West MD   Semaglutide,0.25 or 0.5MG/DOS, (Ozempic, 0.25 or 0.5 MG/DOSE,) 2 MG/3ML solution pen-injector inject 0.25mg SUBCUTANEOUSLY EVERY 7 DAYS AS DIRECTED    Ev West MD        Social History:   Social History     Tobacco Use    Smoking status: Former     Current packs/day: 0.00     Average packs/day: 1 pack/day for 30.0 years (30.0 ttl pk-yrs)     Types: Cigarettes     Start date: 1987     Quit date: 2017     Years since quittin.0     Passive exposure: Past    Smokeless tobacco: Never   Vaping Use    Vaping status: Never " "Used   Substance Use Topics    Alcohol use: Never    Drug use: Never         Review of Systems:  Review of Systems   Constitutional:  Negative for fever.   Respiratory:  Negative for shortness of breath.    Cardiovascular:  Negative for chest pain.   Gastrointestinal:  Positive for abdominal pain, nausea and vomiting. Negative for diarrhea.   All other systems reviewed and are negative.       Physical Exam:  /83   Pulse 73   Temp 97.5 °F (36.4 °C) (Oral)   Resp 18   Ht 162.6 cm (64\")   Wt 81.6 kg (180 lb)   SpO2 100%   BMI 30.90 kg/m²         Physical Exam  Vitals and nursing note reviewed.   Constitutional:       General: She is not in acute distress.     Appearance: Normal appearance. She is well-developed. She is not ill-appearing, toxic-appearing or diaphoretic.   HENT:      Head: Normocephalic and atraumatic.      Nose: Nose normal.      Mouth/Throat:      Mouth: Mucous membranes are moist.   Eyes:      Extraocular Movements: Extraocular movements intact.      Conjunctiva/sclera: Conjunctivae normal.      Pupils: Pupils are equal, round, and reactive to light.   Cardiovascular:      Rate and Rhythm: Normal rate and regular rhythm.      Heart sounds: Normal heart sounds.   Pulmonary:      Effort: Pulmonary effort is normal.      Breath sounds: Normal breath sounds.   Abdominal:      General: Abdomen is flat. Bowel sounds are normal. There is no distension.      Palpations: Abdomen is soft.      Tenderness: There is abdominal tenderness in the epigastric area.      Hernia: No hernia is present.   Musculoskeletal:         General: Normal range of motion.      Cervical back: Normal range of motion and neck supple.   Skin:     General: Skin is warm and dry.   Neurological:      General: No focal deficit present.      Mental Status: She is alert and oriented to person, place, and time.   Psychiatric:         Mood and Affect: Mood normal.         Behavior: Behavior normal.         Thought Content: Thought " content normal.         Judgment: Judgment normal.                            Medical Decision Making:    Comorbidities that affect care:    Diabetes mellitus, GERD, CAD, hypertension, hepatic steatosis    External Notes reviewed:    Previous Radiological Studies: MRI of the abdomen completed on 12-      The following orders were placed and all results were independently analyzed by me:  Orders Placed This Encounter   Procedures    XR Chest 1 View    Comprehensive Metabolic Panel    Lipase    Magnesium    CBC Auto Differential    [place] Draw    Urinalysis With Microscopic If Indicated (No Culture) - Urine, Clean Catch    Lactic Acid, Plasma    NPO Diet NPO Type: Strict NPO    Continuous Pulse Oximetry    Undress & Gown    PO challenge  Misc Nursing Order (Specify)    Oxygen Therapy- Nasal Cannula; Titrate 1-6 LPM Per SpO2; 90 - 95%    ECG 12 Lead ED Triage Standing Order; Chest Pain    ECG 12 Lead ED Triage Standing Order; Chest Pain    Insert Peripheral IV    CBC & Differential    Green Top (Gel)    Lavender Top    Gold Top - SST    Light Blue Top       Medications Given in the Emergency Department:  Medications   sodium chloride 0.9 % flush 10 mL (has no administration in time range)   sodium chloride 0.9 % flush 10 mL (has no administration in time range)   magnesium oxide (MAG-OX) tablet 400 mg (has no administration in time range)   famotidine (PEPCID) injection 20 mg (20 mg Intravenous Given 1/13/25 1759)   ondansetron (ZOFRAN) injection 4 mg (4 mg Intravenous Given 1/13/25 1800)        ED Course:    The patient was initially evaluated in the triage area where orders were placed. The patient was later dispositioned by Conrad Mcmahon PA-C.      The patient was advised to stay for completion of workup which includes but is not limited to communication of labs and radiological results, reassessment and plan. The patient was advised that leaving prior to disposition by a provider could result in  critical findings that are not communicated to the patient.     ED Course as of 01/13/25 1908 Mon Jan 13, 2025   1645 PROVIDER IN TRIAGE  Patient was evaluated by me in triage, Bailey Seaver, APRN, FNP-C.  Orders were placed and patient is currently awaiting final results and disposition.   [AS]   1905 Patient tolerating p.o. on reevaluation.  Will discharge at this time. [MD]      ED Course User Index  [AS] Seaver, Alyce B, APRN  [MD] Conrad Mcmahon PA-C       Labs:    Lab Results (last 24 hours)       Procedure Component Value Units Date/Time    CBC & Differential [[id number]]  (Abnormal) Collected: 01/13/25 1650    Specimen: Blood from Arm, Right Updated: 01/13/25 1705    Narrative:      The following orders were created for panel order CBC & Differential.  Procedure                               Abnormality         Status                     ---------                               -----------         ------                     CBC Auto Differential[[id number]]        Abnormal            Final result               Scan Slide[[id number]]                                                                    Please view results for these tests on the individual orders.    Comprehensive Metabolic Panel [[id number]]  (Abnormal) Collected: 01/13/25 1650    Specimen: Blood from Arm, Right Updated: 01/13/25 1723     Glucose 143 mg/dL      BUN 14 mg/dL      Creatinine 1.04 mg/dL      Sodium 138 mmol/L      Potassium 5.1 mmol/L      Chloride 100 mmol/L      CO2 27.2 mmol/L      Calcium 10.2 mg/dL      Total Protein 7.1 g/dL      Albumin 4.5 g/dL      ALT (SGPT) 15 U/L      AST (SGOT) 20 U/L      Alkaline Phosphatase 60 U/L      Total Bilirubin 0.5 mg/dL      Globulin 2.6 gm/dL      A/G Ratio 1.7 g/dL      BUN/Creatinine Ratio 13.5     Anion Gap 10.8 mmol/L      eGFR 60.5 mL/min/1.73     Narrative:      GFR Categories in Chronic Kidney Disease (CKD)      GFR Category          GFR (mL/min/1.73)    Interpretation  G1                      90 or greater         Normal or high (1)  G2                      60-89                Mild decrease (1)  G3a                   45-59                Mild to moderate decrease  G3b                   30-44                Moderate to severe decrease  G4                    15-29                Severe decrease  G5                    14 or less           Kidney failure          (1)In the absence of evidence of kidney disease, neither GFR category G1 or G2 fulfill the criteria for CKD.    eGFR calculation 2021 CKD-EPI creatinine equation, which does not include race as a factor    Lipase [[id number]]  (Normal) Collected: 01/13/25 1650    Specimen: Blood from Arm, Right Updated: 01/13/25 1723     Lipase 45 U/L     Magnesium [[id number]]  (Abnormal) Collected: 01/13/25 1650    Specimen: Blood from Arm, Right Updated: 01/13/25 1723     Magnesium 1.2 mg/dL     CBC Auto Differential [[id number]]  (Abnormal) Collected: 01/13/25 1650    Specimen: Blood from Arm, Right Updated: 01/13/25 1705     WBC 6.26 10*3/mm3      RBC 4.41 10*6/mm3      Hemoglobin 13.6 g/dL      Hematocrit 41.6 %      MCV 94.3 fL      MCH 30.8 pg      MCHC 32.7 g/dL      RDW 13.2 %      RDW-SD 46.0 fl      MPV 9.7 fL      Platelets 113 10*3/mm3      Neutrophil % 77.9 %      Lymphocyte % 15.8 %      Monocyte % 4.3 %      Eosinophil % 1.0 %      Basophil % 0.5 %      Immature Grans % 0.5 %      Neutrophils, Absolute 4.88 10*3/mm3      Lymphocytes, Absolute 0.99 10*3/mm3      Monocytes, Absolute 0.27 10*3/mm3      Eosinophils, Absolute 0.06 10*3/mm3      Basophils, Absolute 0.03 10*3/mm3      Immature Grans, Absolute 0.03 10*3/mm3      nRBC 0.0 /100 WBC     Lactic Acid, Plasma [[id number]]  (Normal) Collected: 01/13/25 1650    Specimen: Blood from Arm, Right Updated: 01/13/25 1718     Lactate 1.4 mmol/L     Urinalysis With Microscopic If Indicated (No Culture) - Urine, Clean Catch [[id number]]  (Abnormal) Collected: 01/13/25 1721    Specimen: Urine, Clean  Catch Updated: 01/13/25 1737     Color, UA Yellow     Appearance, UA Clear     pH, UA <=5.0     Specific Gravity, UA 1.023     Glucose, UA Negative     Ketones, UA Trace     Bilirubin, UA Negative     Blood, UA Negative     Protein, UA Trace     Leuk Esterase, UA Negative     Nitrite, UA Negative     Urobilinogen, UA 0.2 E.U./dL    Narrative:      Urine microscopic not indicated.             Imaging:    XR Chest 1 View    Result Date: 1/13/2025  XR CHEST 1 VW Date of Exam: 1/13/2025 5:44 PM EST Indication: Chest Pain Triage Protocol Comparison: 8/16/2021 Findings: Cardiomediastinal silhouette is unremarkable.  No airspace disease, pneumothorax, nor pleural effusion. No acute osseous abnormality identified.     Impression: No acute process identified Electronically Signed: Karthik Caruso MD  1/13/2025 5:53 PM EST  Workstation ID: [id number]       Differential Diagnosis and Discussion:      Vomiting: Differential diagnosis includes but is not limited to migraine, labyrinthine disorders, psychogenic, metabolic and endocrine causes, peptic ulcer, gastric outlet obstruction, gastritis, gastroenteritis, appendicitis, intestinal obstruction, paralytic ileus, food poisoning, cholecystitis, acute hepatitis, acute pancreatitis, acute febrile illness, and myocardial infarction.    PROCEDURES:    Labs were collected in the emergency department and all labs were reviewed and interpreted by me.  X-ray were performed in the emergency department and all X-ray impressions were independently interpreted by me.  An EKG was performed and the EKG was interpreted by me.  An EKG was performed and the EKG was interpreted by supervising attending.    ECG 12 Lead ED Triage Standing Order; Chest Pain   Preliminary Result   HEART RATE=58  bpm   RR Dyrdpexa=1157  ms   AZ Uzttfuxv=098  ms   P Horizontal Axis=8  deg   P Front Axis=57  deg   QRSD Interval=84  ms   QT Yuwowuiy=125  ms   KCvQ=937  ms   QRS Axis=40  deg   T Wave Axis=96  deg   -  ABNORMAL ECG -   Sinus rhythm   Nonspecific T abnormalities, lateral leads   Minimal ST elevation, anterior leads   Date and Time of Study:2025-01-13 16:25:19           Procedures    MDM  Number of Diagnoses or Management Options  Hypomagnesemia  Nausea and vomiting, unspecified vomiting type  Diagnosis management comments: Patient presented to the emergency department today for nausea and vomiting after taking Ozempic.  CBC notes normal white blood cell count hemoglobin [place] are.  CMP noted glucose of 143 with creatinine 1.04 otherwise unremarkable.  Lipase normal.  Lactic acid unremarkable.  Magnesium mildly low at 1.2 and was supplemented with magnesium oxide in the emergency department.  Urinalysis notes trace ketones and proteins but no sign of acute UTI.  Chest x-ray unremarkable.  EKG had no acute findings concerning for STEMI.  Patient did not have any chest pain here in the emergency department.  She only states she had chest pain after excessive vomiting.  Patient was given Pepcid and Zofran in the emergency department and able to tolerate p.o. with improvement of her symptoms.  Return to the emergency department guidelines provided.       Amount and/or Complexity of Data Reviewed  Clinical lab tests: reviewed and ordered  Tests in the radiology section of CPT®: reviewed and ordered  Tests in the medicine section of CPT®: reviewed    Risk of Complications, Morbidity, and/or Mortality  Presenting problems: moderate  Diagnostic procedures: moderate    Patient Progress  Patient progress: improved         Patient Care Considerations:    CT ABDOMEN AND PELVIS: I considered ordering a CT scan of the abdomen and pelvis however patient's tenderness is limited to the epigastric area      Consultants/Shared Management Plan:    None    Social Determinants of Health:    Patient is independent, reliable, and has access to care.       Disposition and Care Coordination:    Discharged: The patient is suitable and  stable for discharge with no need for consideration of admission.    I have explained the patient´s condition, diagnoses and treatment plan based on the information available to me at this time. I have answered questions and addressed any concerns. The patient has a good  understanding of the patient´s diagnosis, condition, and treatment plan as can be expected at this point. The vital signs have been stable. The patient´s condition is stable and appropriate for discharge from the emergency department.      The patient will pursue further outpatient evaluation with the primary care physician or other designated or consulting physician as outlined in the discharge instructions. They are agreeable to this plan of care and follow-up instructions have been explained in detail. The patient has received these instructions in written format and has expressed an understanding of the discharge instructions. The patient is aware that any significant change in condition or worsening of symptoms should prompt an immediate return to this or the closest emergency department or call to 911.  I have explained discharge medications and the need for follow up with the patient/caretakers. This was also printed in the discharge instructions. Patient was discharged with the following medications and follow up:      Medication List        New Prescriptions      ondansetron ODT 4 MG disintegrating tablet  Commonly known as: ZOFRAN-ODT  Place 1 tablet on the tongue 4 (Four) Times a Day As Needed for Vomiting.               Where to Get Your Medications        These medications were sent to [facility], [place] - [place], [place] - [address] - [phone]  - [phone] FX  [address], [place] [address]      Phone: [phone]   ondansetron ODT 4 MG disintegrating tablet      Ольга Bhagat, APRN  [address]  [address]  [place] [address] [phone]             Final diagnoses:   Nausea and  vomiting, unspecified vomiting type   Hypomagnesemia        ED Disposition       ED Disposition   Discharge    Condition   Stable    Comment   --               This medical record created using voice recognition software.             Conrad Mcmahon PA-C  01/13/25 [id number]

## 2025-01-14 NOTE — DISCHARGE INSTRUCTIONS
Take Zofran as needed for nausea and vomiting.  Return to the emergency department for new or worsening chest pain, shortness of breath, abdominal pain.

## 2025-01-21 LAB
QT INTERVAL: 428 MS
QTC INTERVAL: 422 MS

## 2025-02-26 ENCOUNTER — TELEPHONE (OUTPATIENT)
Dept: GASTROENTEROLOGY | Facility: CLINIC | Age: 65
End: 2025-02-26
Payer: MEDICARE

## 2025-03-03 ENCOUNTER — LAB (OUTPATIENT)
Dept: LAB | Facility: HOSPITAL | Age: 65
End: 2025-03-03
Payer: MEDICARE

## 2025-03-03 DIAGNOSIS — K74.60 LIVER CIRRHOSIS SECONDARY TO NASH: ICD-10-CM

## 2025-03-03 DIAGNOSIS — R16.0 HEPATOMEGALY: ICD-10-CM

## 2025-03-03 DIAGNOSIS — K75.81 LIVER CIRRHOSIS SECONDARY TO NASH: ICD-10-CM

## 2025-03-03 DIAGNOSIS — K74.60 CIRRHOSIS OF LIVER WITHOUT ASCITES, UNSPECIFIED HEPATIC CIRRHOSIS TYPE: ICD-10-CM

## 2025-03-03 DIAGNOSIS — K76.9 LIVER LESION: ICD-10-CM

## 2025-03-03 LAB
ALPHA-FETOPROTEIN: 2.09 NG/ML (ref 0–8.3)
INR PPP: 1.04 (ref 0.86–1.15)
PROTHROMBIN TIME: 14 SECONDS (ref 11.8–14.9)

## 2025-03-03 PROCEDURE — 36415 COLL VENOUS BLD VENIPUNCTURE: CPT

## 2025-03-03 PROCEDURE — 82105 ALPHA-FETOPROTEIN SERUM: CPT

## 2025-03-03 PROCEDURE — 85610 PROTHROMBIN TIME: CPT

## 2025-03-04 DIAGNOSIS — K21.9 GASTROESOPHAGEAL REFLUX DISEASE WITHOUT ESOPHAGITIS: ICD-10-CM

## 2025-03-04 DIAGNOSIS — K58.0 IRRITABLE BOWEL SYNDROME WITH DIARRHEA: Primary | ICD-10-CM

## 2025-03-04 RX ORDER — COLESTIPOL HYDROCHLORIDE 1 G/1
2 TABLET ORAL
Qty: 60 TABLET | Refills: 2 | Status: SHIPPED | OUTPATIENT
Start: 2025-03-04

## 2025-03-04 RX ORDER — ESOMEPRAZOLE MAGNESIUM 40 MG/1
40 CAPSULE, DELAYED RELEASE ORAL 2 TIMES DAILY
Qty: 60 CAPSULE | Refills: 5 | Status: SHIPPED | OUTPATIENT
Start: 2025-03-04 | End: 2025-03-10

## 2025-03-10 DIAGNOSIS — K21.9 GASTROESOPHAGEAL REFLUX DISEASE WITHOUT ESOPHAGITIS: ICD-10-CM

## 2025-03-10 RX ORDER — ESOMEPRAZOLE MAGNESIUM 40 MG/1
40 CAPSULE, DELAYED RELEASE ORAL 2 TIMES DAILY
Qty: 60 CAPSULE | Refills: 5 | Status: SHIPPED | OUTPATIENT
Start: 2025-03-10

## 2025-03-19 NOTE — PROGRESS NOTES
Chief Complaint   Follow-up (Cirrhosis, taking nexium, pepcid and colestipol. She has occasional reflux at night time but otherwise no concerns. )    Patient or patient representative verbalized consent for the use of Ambient Listening during the visit with  ALEXIS Ramirez for chart documentation. 3/21/2025  11:51 EDT      History of Present Illness       Airam Pool is a 64 y.o. female who presents to [facility] for follow-up     History of Present Illness  The patient is a 64-year-old female presenting in the office today for follow-up with a history of reflux, diabetes, diarrhea, cholecystectomy, fatty liver, breast cancer, suspected cirrhosis secondary to JEFF and history of intestinal metaplasia noted on biopsies from EGD.  Patient continues on colestipol 1 to 2 g/day titrating as this works well for her diarrhea.  She continues Nexium 40 mg twice daily for continued reflux and continues lifestyle changes related to reflux.  Patient continues with breakthrough reflux especially at night.  Patient is using 3 pillows at night to prop up.  Patient has been on Mounjaro and Ozempic related to her diabetes and wishes not to come off of these medications as it is working well for her diabetes and A1c control despite the fact that this could be causing worsening gastroparesis and reflux.  We have reviewed recent ultrasound which displays fatty liver and splenomegaly.  Patient denies abdominal swelling, leg swelling, confusion, easily bleeding, fever, nausea, vomiting, weight loss, night sweats, melena, hematochezia, hematemesis.    She reports experiencing heartburn and reflux, which have not been regurgitating into her mouth but remain present in her throat, causing a persistent taste. She manages to sleep comfortably using three pillows for elevation, although she occasionally wakes up during the night. She does not report any associated fatigue or confusion. She is currently on  Nexium and Pepcid for these symptoms.    She has recently switched her pharmacy to [facility] and is requesting refills of her medications. She is taking colestipol for diarrhea, which she finds effective. She typically takes one tablet but increases the dose to two tablets if she experiences daily diarrhea, which resolves the issue within a few days.    Supplemental Information  She had a headache last night and woke up with it this morning.    MEDICATIONS  Nexium, Pepcid, colestipol      Most recent labs- 3/3/2025    MRI Abdomen With & Without Contrast- 12/18/24  1. No hypervascular or washout liver lesion to suggest hepatocellular carcinoma. Recommend continued screening.  2. Slightly irregular peripheral hepatic contour suggesting chronic liver disease. Sequelae of portal hypertension noted including splenomegaly and mild varices. No ascites. Portal vasculature patent.  3. No acute MRI findings. Other chronic/ancillary findings detailed above.    US Abdomen Limited- 12/11/24  1.Heterogeneous liver may be slightly nodular. Provide history states cirrhosis. Mild hepatic steatosis.  2.2 new hyperechoic lesions are seen in the liver measuring 1.7 cm and 1.1 cm. These were not well seen on the previous ultrasound and in the context of cirrhosis are concerning. Recommend MRI to exclude neoplasm.  3.Splenomegaly and dilated portal vein may reflect portal venous hypertension.  4.Cholecystectomy. Mildly dilated common bile duct is similar to previous exam.    Last office visit- 7/22/24  Airam Pool is a 63 y.o. female who presents to [facility] for follow-up with a history of reflux, diabetes, diarrhea, cholecystectomy, fatty liver, breast cancer, suspected cirrhosis secondary to JEFF and history of intestinal metaplasia noted on biopsies from EGD.  Patient continues on colestipol 1 to 2 g/day titrating as this works well for her diarrhea.  She continues Nexium 40 mg twice daily for  continued reflux and continues lifestyle changes related to reflux.  Patient continues with breakthrough reflux especially at night.  Patient is using 3 pillows at night to prop up.  Patient has been on Mounjaro and Ozempic related to her diabetes and wishes not to come off of these medications as it is working well for her diabetes and A1c control despite the fact that this could be causing worsening gastroparesis and reflux.  We have reviewed recent ultrasound which displays fatty liver and splenomegaly.  Patient denies abdominal swelling, leg swelling, confusion, easily bleeding, fever, nausea, vomiting, weight loss, night sweats, melena, hematochezia, hematemesis.     US Abdomen Limited- 6/10/24 hepatic steatosis and splenomegaly     EGD: Review of the patient's most recent EGD performed by Dr. Fischer on 4/15/24 biopsies with intestinal metaplasia and active gastritis of the stomach.  Repeat 2 years.     EGD: Review of the patient's most recent EGD performed by Dr. Fischer on 04/28/23 medium size food in the stomach.  Intestinal metaplasia noted on biopsy of the stomach.  Repeat 1 year.     Endoscopy: Review of the patient's most recent EGD and colonoscopy performed by Dr. Fischer on 5/27/2021 deformity of the IC valve unable to intubate terminal ileum possible Crohn's.  Single polyp removed from the ascending colon normal esophagus stomach and duodenum.  Pathology reveals tubular adenoma.  Repeat colonoscopy 2026.     CT scan of the abdomen and pelvis with contrast 5/27/2021 normal 6/8/2021 revealed hepatic steatosis, splenomegaly, gastroesophageal varices consistent with portal hypertension     Prometheus testing negative on 6/21/21     Fibroscan preformed on 05.27.2022 S3 F3    Results       Result Review :   The following data was reviewed by: ALEXIS Ramirez on 03/21/2025:    CMP          10/4/2024    11:50 1/13/2025    16:50   CMP   Glucose 109  143    BUN 23  14    Creatinine 1.04  1.04    EGFR 60.9   60.5    Sodium 140  138    Potassium 4.8  5.1    Chloride 105  100    Calcium 9.6  10.2    Total Protein 6.5  7.1    Albumin 4.3  4.5    Globulin 2.2  2.6    Total Bilirubin 0.2  0.5    Alkaline Phosphatase 56  60    AST (SGOT) 16  20    ALT (SGPT) 14  15    Albumin/Globulin Ratio 2.0  1.7    BUN/Creatinine Ratio 22.1  13.5    Anion Gap 9.0  10.8      CBC          10/4/2024    11:50 12/5/2024    12:43 1/13/2025    16:50   CBC   WBC 4.59  6.09  6.26    RBC 3.76  3.97  4.41    Hemoglobin 11.9  12.7  13.6    Hematocrit 34.9  37.1  41.6    MCV 92.8  93.5  94.3    MCH 31.6  32.0  30.8    MCHC 34.1  34.2  32.7    RDW 14.1  13.2  13.2    Platelets 95  113  113        Iron Profile   Iron   Date Value Ref Range Status   01/27/2022 42 37 - 145 mcg/dL Final     TIBC   Date Value Ref Range Status   01/27/2022 371 298 - 536 mcg/dL Final     Iron Saturation (TSAT)   Date Value Ref Range Status   01/27/2022 11 (L) 20 - 50 % Final     Transferrin   Date Value Ref Range Status   01/27/2022 249 200 - 360 mg/dL Final     Ferritin   Ferritin   Date Value Ref Range Status   01/27/2022 331.00 (H) 13.00 - 150.00 ng/mL Final               Results  Laboratory Studies  Alpha-fetoprotein was normal. PT/INR was normal.    Imaging  MRI of the liver showed no hypervascular or washout liver lesions, no ascites, and no other acute findings. Chronic liver disease and an enlarged spleen were noted. Varices were present.      Past Medical History       Past Medical History:   Diagnosis Date    Breast cancer     LEFT BREAST    Carotid artery calcification     pt is going to have endarectomy 8/24/21    Coronary artery disease 2020    Diabetes mellitus, type II     Fatty liver     GERD (gastroesophageal reflux disease)     Hernia Dont know    High cholesterol     Hypertension     Irritable bowel syndrome     Liver disease     FATTY LIVER       Past Surgical History:   Procedure Laterality Date    BACK SURGERY      BREAST BIOPSY      BREAST LUMPECTOMY       left    CAROTID ENDARTERECTOMY Left     CHOLECYSTECTOMY      COLONOSCOPY  05/2021    luis alberto    ENDOSCOPY N/A 01/31/2022    Procedure: ESOPHAGOGASTRODUODENOSCOPY WITH BX;  Surgeon: Star Fischer MD;  Location: [facility] ENDOSCOPY;  Service: Gastroenterology;  Laterality: N/A;  NORMAL EGD    ENDOSCOPY N/A 04/28/2023    Procedure: ESOPHAGOGASTRODUODENOSCOPY WITH BIOPSY;  Surgeon: Star Fischer MD;  Location: [facility] ENDOSCOPY;  Service: Gastroenterology;  Laterality: N/A;  RETAINED FOOD    ENDOSCOPY N/A 4/15/2024    Procedure: ESOPHAGOGASTRODUODENOSCOPY WITH BIOPSY;  Surgeon: Star Fischer MD;  Location: [facility] ENDOSCOPY;  Service: Gastroenterology;  Laterality: N/A;  NORMAL    SPINE SURGERY      TUBAL ABDOMINAL LIGATION      UPPER GASTROINTESTINAL ENDOSCOPY           Current Outpatient Medications:     amLODIPine (NORVASC) 10 MG tablet, Take 1 tablet by mouth Daily., Disp: , Rfl:     aspirin 81 MG EC tablet, Take 1 tablet by mouth Daily. Last dose Friday 1/28, Disp: , Rfl:     BD Pen Needle Awilda U/F 32G X 4 MM misc, INJECT using 1 FOUR TIMES DAILY before meals and nightly, Disp: , Rfl:     carvedilol (COREG) 6.25 MG tablet, Take 1 tablet by mouth 2 (Two) Times a Day With Meals., Disp: , Rfl:     colestipol (COLESTID) 1 g tablet, Take 2 tablets by mouth every night at bedtime., Disp: 60 tablet, Rfl: 2    Continuous Blood Gluc Sensor (Dexcom G6 Sensor), apply 1 topically EVERY 10 days, Disp: , Rfl:     Continuous Blood Gluc Transmit (Dexcom G6 Transmitter) misc, See Admin Instructions., Disp: , Rfl:     Continuous Glucose  (Dexcom G6 ) device, See Admin Instructions., Disp: , Rfl:     DULoxetine (CYMBALTA) 20 MG capsule, Take 1 capsule by mouth Daily., Disp: , Rfl:     esomeprazole (nexIUM) 40 MG capsule, Take 1 capsule by mouth 2 (Two) Times a Day., Disp: 60 capsule, Rfl: 5    famotidine (Pepcid) 40 MG tablet, Take 1 tablet by mouth Daily., Disp: 90 tablet, Rfl: 2     "gabapentin (NEURONTIN) 300 MG capsule, Take 2 capsules by mouth 3 (Three) Times a Day., Disp: , Rfl:     glipizide (GLUCOTROL) 10 MG tablet, Take 1 tablet by mouth Daily., Disp: , Rfl:     HYDROcodone-acetaminophen (NORCO) 5-325 MG per tablet, hydrocodone 5 mg-acetaminophen 325 mg tablet  TAKE 1 TABLET BY MOUTH EVERY 8 TO 12 HOURS AS NEEDED FOR SEVERE PAIN. FILL DATE 05/12/2022, Disp: , Rfl:     Insulin Glargine, 2 Unit Dial, (Toujeo Max SoloStar) 300 UNIT/ML solution pen-injector injection, Inject 30 Units under the skin into the appropriate area as directed Daily., Disp: , Rfl:     Insulin Lispro, 1 Unit Dial, (HUMALOG) 100 UNIT/ML solution pen-injector, Inject 10 units twice daily with meals plus sliding scale., Disp: , Rfl:     Insulin Syringe-Needle U-100 (BD Veo Insulin Syringe U/F) 31G X 15/64\" 1 ML misc, USE TO INJECT INTO THE SKIN AS NEEDED, Disp: , Rfl:     lisinopril (PRINIVIL,ZESTRIL) 40 MG tablet, Take 1 tablet by mouth Daily., Disp: , Rfl:     meloxicam (MOBIC) 15 MG tablet, , Disp: , Rfl:     metFORMIN (GLUCOPHAGE) 500 MG tablet, Take 2 tablets by mouth., Disp: , Rfl:     methocarbamol (ROBAXIN) 500 MG tablet, Take 1 tablet by mouth 4 (Four) Times a Day., Disp: , Rfl:     ondansetron ODT (ZOFRAN-ODT) 4 MG disintegrating tablet, Place 1 tablet on the tongue 4 (Four) Times a Day As Needed for Vomiting., Disp: 30 tablet, Rfl: 0    Ozempic, 1 MG/DOSE, 4 MG/3ML solution pen-injector, , Disp: , Rfl:     pravastatin (PRAVACHOL) 80 MG tablet, Take 1 tablet by mouth Daily., Disp: , Rfl:     Semaglutide,0.25 or 0.5MG/DOS, (Ozempic, 0.25 or 0.5 MG/DOSE,) 2 MG/3ML solution pen-injector, inject 0.25mg SUBCUTANEOUSLY EVERY 7 DAYS AS DIRECTED, Disp: , Rfl:     hydroCHLOROthiazide (HYDRODIURIL) 25 MG tablet, Take 1 tablet by mouth Daily., Disp: , Rfl:      No Known Allergies    Family History   Problem Relation Age of Onset    Lung cancer Father     Cancer Father     Cancer Paternal Uncle     Colon cancer Neg Hx     " "Malig Hyperthermia Neg Hx         Social History     Social History Narrative    Not on file       Objective       Vital Signs:   /81 (BP Location: Right arm, Patient Position: Sitting, Cuff Size: Adult)   Pulse 75   Ht 162.6 cm (64\")   Wt 82.1 kg (181 lb 1.6 oz)   SpO2 93%   BMI 31.09 kg/m²       Physical Exam  Constitutional:       Appearance: Normal appearance.   Pulmonary:      Effort: Pulmonary effort is normal.   Neurological:      General: No focal deficit present.      Mental Status: She is alert and oriented to person, place, and time.   Psychiatric:         Mood and Affect: Mood normal.         Behavior: Behavior normal.         Physical Exam          Assessment & Plan          Assessment and Plan    Diagnoses and all orders for this visit:    1. Gastroesophageal reflux disease without esophagitis (Primary)  -     US Abdomen Limited; Future  -     CBC (No Diff); Future  -     Comprehensive Metabolic Panel; Future  -     Protime-INR; Future  -     AFP Tumor Marker; Future  -     esomeprazole (nexIUM) 40 MG capsule; Take 1 capsule by mouth 2 (Two) Times a Day.  Dispense: 60 capsule; Refill: 5    2. Irritable bowel syndrome with diarrhea  -     US Abdomen Limited; Future  -     CBC (No Diff); Future  -     Comprehensive Metabolic Panel; Future  -     Protime-INR; Future  -     AFP Tumor Marker; Future  -     colestipol (COLESTID) 1 g tablet; Take 2 tablets by mouth every night at bedtime.  Dispense: 60 tablet; Refill: 2    3. Cirrhosis of liver without ascites, unspecified hepatic cirrhosis type  -     US Abdomen Limited; Future  -     CBC (No Diff); Future  -     Comprehensive Metabolic Panel; Future  -     Protime-INR; Future  -     AFP Tumor Marker; Future    4. 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 JEFF (nonalcoholic " steatohepatitis)  -     US Abdomen Limited; Future  -     CBC (No Diff); Future  -     Comprehensive Metabolic Panel; Future  -     Protime-INR; Future  -     AFP Tumor Marker; Future    6. Thrombocytopenia  -     US Abdomen Limited; Future  -     CBC (No Diff); Future  -     Comprehensive Metabolic Panel; Future  -     Protime-INR; Future  -     AFP Tumor Marker; Future    7. Hepatomegaly  -     US Abdomen Limited; Future  -     CBC (No Diff); Future  -     Comprehensive Metabolic Panel; Future  -     Protime-INR; Future  -     AFP Tumor Marker; Future    8. Liver lesion  -     US Abdomen Limited; Future  -     CBC (No Diff); Future  -     Comprehensive Metabolic Panel; Future  -     Protime-INR; Future  -     AFP Tumor Marker; Future    9. Fatty liver  -     US Abdomen Limited; Future  -     CBC (No Diff); Future  -     Comprehensive Metabolic Panel; Future  -     Protime-INR; Future  -     AFP Tumor Marker; Future    10. Gastroesophageal reflux disease, unspecified whether esophagitis present  -     US Abdomen Limited; Future  -     CBC (No Diff); Future  -     Comprehensive Metabolic Panel; Future  -     Protime-INR; Future  -     AFP Tumor Marker; Future    Other orders  -     famotidine (Pepcid) 40 MG tablet; Take 1 tablet by mouth Daily.  Dispense: 90 tablet; Refill: 2        Assessment & Plan  1. Heartburn and reflux.  She reports that her heartburn and reflux are not as severe as before but still present, with a taste in her throat despite taking Nexium and Pepcid. Samples of Voquena will be provided to see if it offers better relief. If Voquena is not effective or not well-tolerated, she should continue with Nexium and Pepcid.    2. Chronic liver disease.  Her MRI from December showed no hypervascular or washout liver lesions, suggesting no cancer. The spleen is slightly enlarged, and varices are present, but there is no ascites or other acute findings. An ultrasound will be ordered for June to monitor her  condition. Labs, including alpha-fetoprotein and PT/INR, will be repeated in June.    3. Diarrhea.  She is managing her diarrhea with colestipol, taking 1-2 tablets as needed. She reports that taking one tablet most of the time is effective, but she doubles the dose if symptoms worsen, which resolves the issue.    Follow-up  The patient will follow up in 6 months.            Follow Up       Follow Up   Return in about 6 months (around 9/21/2025).  Patient was given instructions and counseling regarding her condition or for health maintenance advice. Please see specific information pulled into the AVS if appropriate.

## 2025-03-21 ENCOUNTER — OFFICE VISIT (OUTPATIENT)
Dept: GASTROENTEROLOGY | Facility: CLINIC | Age: 65
End: 2025-03-21
Payer: MEDICARE

## 2025-03-21 VITALS
BODY MASS INDEX: 30.92 KG/M2 | DIASTOLIC BLOOD PRESSURE: 81 MMHG | WEIGHT: 181.1 LBS | HEIGHT: 64 IN | OXYGEN SATURATION: 93 % | HEART RATE: 75 BPM | SYSTOLIC BLOOD PRESSURE: 153 MMHG

## 2025-03-21 DIAGNOSIS — R16.0 HEPATOMEGALY: ICD-10-CM

## 2025-03-21 DIAGNOSIS — K76.0 FATTY LIVER: ICD-10-CM

## 2025-03-21 DIAGNOSIS — D69.6 THROMBOCYTOPENIA: ICD-10-CM

## 2025-03-21 DIAGNOSIS — K21.9 GASTROESOPHAGEAL REFLUX DISEASE, UNSPECIFIED WHETHER ESOPHAGITIS PRESENT: ICD-10-CM

## 2025-03-21 DIAGNOSIS — K74.60 CIRRHOSIS OF LIVER WITHOUT ASCITES, UNSPECIFIED HEPATIC CIRRHOSIS TYPE: ICD-10-CM

## 2025-03-21 DIAGNOSIS — K75.81 NASH (NONALCOHOLIC STEATOHEPATITIS): ICD-10-CM

## 2025-03-21 DIAGNOSIS — K58.0 IRRITABLE BOWEL SYNDROME WITH DIARRHEA: ICD-10-CM

## 2025-03-21 DIAGNOSIS — K76.9 LIVER LESION: ICD-10-CM

## 2025-03-21 DIAGNOSIS — K21.9 GASTROESOPHAGEAL REFLUX DISEASE WITHOUT ESOPHAGITIS: Primary | ICD-10-CM

## 2025-03-21 DIAGNOSIS — K74.60 LIVER CIRRHOSIS SECONDARY TO NASH: ICD-10-CM

## 2025-03-21 DIAGNOSIS — K75.81 LIVER CIRRHOSIS SECONDARY TO NASH: ICD-10-CM

## 2025-03-21 RX ORDER — FAMOTIDINE 40 MG/1
40 TABLET, FILM COATED ORAL DAILY
Qty: 90 TABLET | Refills: 2 | Status: SHIPPED | OUTPATIENT
Start: 2025-03-21

## 2025-03-21 RX ORDER — LISINOPRIL 40 MG/1
40 TABLET ORAL DAILY
COMMUNITY
Start: 2025-03-06 | End: 2026-03-06

## 2025-03-21 RX ORDER — VONOPRAZAN FUMARATE 26.72 MG/1
20 TABLET ORAL DAILY
Qty: 10 TABLET | Refills: 0 | COMMUNITY
Start: 2025-03-21

## 2025-03-21 RX ORDER — ESOMEPRAZOLE MAGNESIUM 40 MG/1
40 CAPSULE, DELAYED RELEASE ORAL 2 TIMES DAILY
Qty: 60 CAPSULE | Refills: 5 | Status: SHIPPED | OUTPATIENT
Start: 2025-03-21

## 2025-03-21 RX ORDER — PROCHLORPERAZINE 25 MG/1
SUPPOSITORY RECTAL SEE ADMIN INSTRUCTIONS
COMMUNITY
Start: 2025-03-06

## 2025-03-21 RX ORDER — COLESTIPOL HYDROCHLORIDE 1 G/1
2 TABLET ORAL
Qty: 60 TABLET | Refills: 2 | Status: SHIPPED | OUTPATIENT
Start: 2025-03-21

## 2025-05-26 DIAGNOSIS — K58.0 IRRITABLE BOWEL SYNDROME WITH DIARRHEA: ICD-10-CM

## 2025-05-27 RX ORDER — COLESTIPOL HYDROCHLORIDE 1 G/1
2 TABLET ORAL
Qty: 180 TABLET | Refills: 3 | Status: SHIPPED | OUTPATIENT
Start: 2025-05-27

## 2025-05-27 NOTE — TELEPHONE ENCOUNTER
Medication Requested Colestipol 1 G    Last Refill 03/21/25    Last OV 03/21/25    Next OV 09/29/25    Medication pended for approval and correct pharmacy verified Yes

## 2025-06-02 ENCOUNTER — HOSPITAL ENCOUNTER (OUTPATIENT)
Dept: ULTRASOUND IMAGING | Facility: HOSPITAL | Age: 65
Discharge: HOME | End: 2025-06-02
Admitting: NURSE PRACTITIONER
Payer: MEDICARE

## 2025-06-02 DIAGNOSIS — D69.6 THROMBOCYTOPENIA: ICD-10-CM

## 2025-06-02 DIAGNOSIS — K58.0 IRRITABLE BOWEL SYNDROME WITH DIARRHEA: ICD-10-CM

## 2025-06-02 DIAGNOSIS — K74.60 CIRRHOSIS OF LIVER WITHOUT ASCITES, UNSPECIFIED HEPATIC CIRRHOSIS TYPE: ICD-10-CM

## 2025-06-02 DIAGNOSIS — K75.81 NASH (NONALCOHOLIC STEATOHEPATITIS): ICD-10-CM

## 2025-06-02 DIAGNOSIS — R16.0 HEPATOMEGALY: ICD-10-CM

## 2025-06-02 DIAGNOSIS — K75.81 LIVER CIRRHOSIS SECONDARY TO NASH: ICD-10-CM

## 2025-06-02 DIAGNOSIS — K74.60 LIVER CIRRHOSIS SECONDARY TO NASH: ICD-10-CM

## 2025-06-02 DIAGNOSIS — K76.0 FATTY LIVER: ICD-10-CM

## 2025-06-02 DIAGNOSIS — K21.9 GASTROESOPHAGEAL REFLUX DISEASE, UNSPECIFIED WHETHER ESOPHAGITIS PRESENT: ICD-10-CM

## 2025-06-02 DIAGNOSIS — K21.9 GASTROESOPHAGEAL REFLUX DISEASE WITHOUT ESOPHAGITIS: ICD-10-CM

## 2025-06-02 DIAGNOSIS — K76.9 LIVER LESION: ICD-10-CM

## 2025-06-02 PROCEDURE — 76705 ECHO EXAM OF ABDOMEN: CPT

## 2025-06-04 ENCOUNTER — RESULTS FOLLOW-UP (OUTPATIENT)
Dept: GASTROENTEROLOGY | Facility: CLINIC | Age: 65
End: 2025-06-04
Payer: MEDICARE

## 2025-06-18 ENCOUNTER — HOSPITAL ENCOUNTER (OUTPATIENT)
Dept: MRI IMAGING | Facility: HOSPITAL | Age: 65
Discharge: HOME | End: 2025-06-18
Payer: MEDICARE

## 2025-06-18 ENCOUNTER — LAB (OUTPATIENT)
Dept: LAB | Facility: HOSPITAL | Age: 65
End: 2025-06-18
Payer: MEDICARE

## 2025-06-18 DIAGNOSIS — K75.81 LIVER CIRRHOSIS SECONDARY TO NASH: ICD-10-CM

## 2025-06-18 DIAGNOSIS — D69.6 THROMBOCYTOPENIA: ICD-10-CM

## 2025-06-18 DIAGNOSIS — K74.60 CIRRHOSIS OF LIVER WITHOUT ASCITES, UNSPECIFIED HEPATIC CIRRHOSIS TYPE: ICD-10-CM

## 2025-06-18 DIAGNOSIS — K75.81 NASH (NONALCOHOLIC STEATOHEPATITIS): ICD-10-CM

## 2025-06-18 DIAGNOSIS — R16.0 HEPATOMEGALY: ICD-10-CM

## 2025-06-18 DIAGNOSIS — K74.60 LIVER CIRRHOSIS SECONDARY TO NASH: ICD-10-CM

## 2025-06-18 DIAGNOSIS — K76.0 FATTY LIVER: ICD-10-CM

## 2025-06-18 DIAGNOSIS — K21.9 GASTROESOPHAGEAL REFLUX DISEASE WITHOUT ESOPHAGITIS: ICD-10-CM

## 2025-06-18 DIAGNOSIS — K76.9 LIVER LESION: ICD-10-CM

## 2025-06-18 DIAGNOSIS — K21.9 GASTROESOPHAGEAL REFLUX DISEASE, UNSPECIFIED WHETHER ESOPHAGITIS PRESENT: ICD-10-CM

## 2025-06-18 DIAGNOSIS — K58.0 IRRITABLE BOWEL SYNDROME WITH DIARRHEA: ICD-10-CM

## 2025-06-18 LAB
ALBUMIN SERPL-MCNC: 4.2 G/DL (ref 3.5–5.2)
ALBUMIN/GLOB SERPL: 1.9 G/DL
ALP SERPL-CCNC: 47 U/L (ref 39–117)
ALPHA-FETOPROTEIN: <2 NG/ML (ref 0–8.3)
ALT SERPL W P-5'-P-CCNC: 13 U/L (ref 1–33)
ANION GAP SERPL CALCULATED.3IONS-SCNC: 13.5 MMOL/L (ref 5–15)
AST SERPL-CCNC: 22 U/L (ref 1–32)
BILIRUB SERPL-MCNC: 0.2 MG/DL (ref 0–1.2)
BUN SERPL-MCNC: 16 MG/DL (ref 8–23)
BUN/CREAT SERPL: 17.6 (ref 7–25)
CALCIUM SPEC-SCNC: 9 MG/DL (ref 8.6–10.5)
CHLORIDE SERPL-SCNC: 103 MMOL/L (ref 98–107)
CO2 SERPL-SCNC: 22.5 MMOL/L (ref 22–29)
CREAT BLDA-MCNC: 1 MG/DL (ref 0.6–1.3)
CREAT SERPL-MCNC: 0.91 MG/DL (ref 0.57–1)
DEPRECATED RDW RBC AUTO: 46.6 FL (ref 37–54)
EGFRCR SERPLBLD CKD-EPI 2021: 63.4 ML/MIN/1.73
EGFRCR SERPLBLD CKD-EPI 2021: 71 ML/MIN/1.73
ERYTHROCYTE [DISTWIDTH] IN BLOOD BY AUTOMATED COUNT: 13.3 % (ref 12.3–15.4)
GLOBULIN UR ELPH-MCNC: 2.2 GM/DL
GLUCOSE SERPL-MCNC: 103 MG/DL (ref 65–99)
HCT VFR BLD AUTO: 36.8 % (ref 34–46.6)
HGB BLD-MCNC: 12 G/DL (ref 12–15.9)
INR PPP: 0.99 (ref 0.86–1.15)
MCH RBC QN AUTO: 31.6 PG (ref 26.6–33)
MCHC RBC AUTO-ENTMCNC: 32.6 G/DL (ref 31.5–35.7)
MCV RBC AUTO: 96.8 FL (ref 79–97)
PLATELET # BLD AUTO: 93 10*3/MM3 (ref 140–450)
PMV BLD AUTO: 10.4 FL (ref 6–12)
POTASSIUM SERPL-SCNC: 4.4 MMOL/L (ref 3.5–5.2)
PROT SERPL-MCNC: 6.4 G/DL (ref 6–8.5)
PROTHROMBIN TIME: 13.5 SECONDS (ref 11.8–14.9)
RBC # BLD AUTO: 3.8 10*6/MM3 (ref 3.77–5.28)
SODIUM SERPL-SCNC: 139 MMOL/L (ref 136–145)
WBC NRBC COR # BLD AUTO: 4.07 10*3/MM3 (ref 3.4–10.8)

## 2025-06-18 PROCEDURE — A9577 INJ MULTIHANCE: HCPCS | Performed by: NURSE PRACTITIONER

## 2025-06-18 PROCEDURE — 85610 PROTHROMBIN TIME: CPT

## 2025-06-18 PROCEDURE — 82105 ALPHA-FETOPROTEIN SERUM: CPT

## 2025-06-18 PROCEDURE — 82565 ASSAY OF CREATININE: CPT

## 2025-06-18 PROCEDURE — 25510000002 GADOBENATE DIMEGLUMINE 529 MG/ML SOLUTION: Performed by: NURSE PRACTITIONER

## 2025-06-18 PROCEDURE — 80053 COMPREHEN METABOLIC PANEL: CPT

## 2025-06-18 PROCEDURE — 74183 MRI ABD W/O CNTR FLWD CNTR: CPT

## 2025-06-18 PROCEDURE — 36415 COLL VENOUS BLD VENIPUNCTURE: CPT

## 2025-06-18 PROCEDURE — 85027 COMPLETE CBC AUTOMATED: CPT

## 2025-06-18 RX ADMIN — GADOBENATE DIMEGLUMINE 15 ML: 529 INJECTION, SOLUTION INTRAVENOUS at 11:42

## 2025-06-26 ENCOUNTER — RESULTS FOLLOW-UP (OUTPATIENT)
Dept: GASTROENTEROLOGY | Facility: CLINIC | Age: 65
End: 2025-06-26
Payer: MEDICARE

## 2025-07-14 ENCOUNTER — TRANSCRIBE ORDERS (OUTPATIENT)
Dept: ADMINISTRATIVE | Facility: HOSPITAL | Age: 65
End: 2025-07-14
Payer: MEDICARE

## 2025-07-14 DIAGNOSIS — M47.812 CERVICAL SPONDYLOSIS WITHOUT MYELOPATHY: Primary | ICD-10-CM

## 2025-07-29 DIAGNOSIS — K21.9 GASTROESOPHAGEAL REFLUX DISEASE WITHOUT ESOPHAGITIS: ICD-10-CM

## 2025-07-29 NOTE — TELEPHONE ENCOUNTER
Can you please review with patient Nexium 40 twice daily is a high dose and we typically do not recommend this long-term, is she able to skip the second dose?  Actually Nesha is also on her list-please confirm she should not be taking both?

## 2025-07-29 NOTE — TELEPHONE ENCOUNTER
Medication Requested Esomeprazole 40mg    Last Refill 3/21/2025    Last OV 3/21/2025    Next OV 9/29/2025    Medication pended for approval and correct pharmacy verified Yes

## 2025-07-30 NOTE — TELEPHONE ENCOUNTER
Patient was notified and verbalized understanding. Patient reported that she is not taking voquezna and is taking nexium twice daily. Patient reported that she cannot skip the second dose as her reflux symptoms are severe at night if she does not take it BID.

## 2025-07-31 RX ORDER — ESOMEPRAZOLE MAGNESIUM 40 MG/1
40 CAPSULE, DELAYED RELEASE ORAL 2 TIMES DAILY
Qty: 60 CAPSULE | Refills: 0 | Status: SHIPPED | OUTPATIENT
Start: 2025-07-31

## 2025-08-06 RX ORDER — FAMOTIDINE 40 MG/1
40 TABLET, FILM COATED ORAL DAILY
Qty: 90 TABLET | Refills: 2 | Status: SHIPPED | OUTPATIENT
Start: 2025-08-06

## (undated) DEVICE — CONN JET HYDRA H20 AUXILIARY DISP

## (undated) DEVICE — SOLIDIFIER LIQLOC PLS 1500CC BT

## (undated) DEVICE — SINGLE-USE BIOPSY FORCEPS: Brand: RADIAL JAW 4

## (undated) DEVICE — SOL IRRG H2O PL/BG 1000ML STRL

## (undated) DEVICE — Device: Brand: DEFENDO AIR/WATER/SUCTION AND BIOPSY VALVE

## (undated) DEVICE — Device

## (undated) DEVICE — LINER SURG CANSTR SXN S/RIGD 1500CC

## (undated) DEVICE — BLCK/BITE BLOX WO/DENTL/RIM W/STRAP 54F

## (undated) DEVICE — EGD OR ERCP KIT: Brand: MEDLINE INDUSTRIES, INC.